# Patient Record
Sex: MALE | Race: BLACK OR AFRICAN AMERICAN | NOT HISPANIC OR LATINO | Employment: UNEMPLOYED | RURAL
[De-identification: names, ages, dates, MRNs, and addresses within clinical notes are randomized per-mention and may not be internally consistent; named-entity substitution may affect disease eponyms.]

---

## 2020-10-27 ENCOUNTER — HISTORICAL (OUTPATIENT)
Dept: ADMINISTRATIVE | Facility: HOSPITAL | Age: 40
End: 2020-10-27

## 2020-10-27 LAB
ALBUMIN SERPL BCP-MCNC: 4 G/DL (ref 3.5–5)
ALBUMIN/GLOB SERPL: 1 {RATIO}
ALP SERPL-CCNC: 50 U/L (ref 45–115)
ALT SERPL W P-5'-P-CCNC: 85 U/L (ref 16–61)
AMPHET UR QL SCN: NEGATIVE
ANION GAP SERPL CALCULATED.3IONS-SCNC: 16 MMOL/L
APAP UR QL SCN: ABNORMAL
AST SERPL W P-5'-P-CCNC: 57 U/L (ref 15–37)
BARBITURATES UR QL SCN: NEGATIVE
BASOPHILS # BLD AUTO: 0.02 X10E3/UL (ref 0–0.2)
BASOPHILS NFR BLD AUTO: 0.5 % (ref 0–1)
BENZODIAZ METAB UR QL SCN: NEGATIVE
BILIRUB SERPL-MCNC: 1.1 MG/DL (ref 0–1.2)
BUN SERPL-MCNC: 12 MG/DL (ref 7–18)
BUN/CREAT SERPL: 10.9
CALCIUM SERPL-MCNC: 9.3 MG/DL (ref 8.5–10.1)
CHLORIDE SERPL-SCNC: 100 MMOL/L (ref 98–107)
CO2 SERPL-SCNC: 24 MMOL/L (ref 21–32)
COCAINE UR QL SCN: NEGATIVE
CREAT SERPL-MCNC: 1.1 MG/DL (ref 0.7–1.3)
EOSINOPHIL # BLD AUTO: 0.03 X10E3/UL (ref 0–0.5)
EOSINOPHIL NFR BLD AUTO: 0.7 % (ref 1–4)
ERYTHROCYTE [DISTWIDTH] IN BLOOD BY AUTOMATED COUNT: 12.8 % (ref 11.5–14.5)
GLOBULIN SER-MCNC: 4.1 G/DL (ref 2–4)
GLUCOSE SERPL-MCNC: 207 MG/DL (ref 70–105)
GLUCOSE SERPL-MCNC: 342 MG/DL (ref 74–106)
HCT VFR BLD AUTO: 47.3 % (ref 40–54)
HGB BLD-MCNC: 17.3 G/DL (ref 13.5–18)
IMM GRANULOCYTES # BLD AUTO: 0.03 X10E3/UL (ref 0–0.04)
IMM GRANULOCYTES NFR BLD: 0.7 % (ref 0–0.4)
LYMPHOCYTES # BLD AUTO: 1.31 X10E3/UL (ref 1–4.8)
LYMPHOCYTES NFR BLD AUTO: 29.8 % (ref 27–41)
MCH RBC QN AUTO: 31.8 PG (ref 27–31)
MCHC RBC AUTO-ENTMCNC: 36.6 G/DL (ref 32–36)
MCV RBC AUTO: 86.9 FL (ref 80–96)
MDA UR QL SCN: NEGATIVE
METHADONE UR QL SCN: ABNORMAL
METHAMPHET UR QL SCN: ABNORMAL
MONOCYTES # BLD AUTO: 0.33 X10E3/UL (ref 0–0.8)
MONOCYTES NFR BLD AUTO: 7.5 % (ref 2–6)
MPC BLD CALC-MCNC: 10.9 FL (ref 9.4–12.4)
NEUTROPHILS # BLD AUTO: 2.67 X10E3/UL (ref 1.8–7.7)
NEUTROPHILS NFR BLD AUTO: 60.8 % (ref 53–65)
NT-PROBNP SERPL-MCNC: 54 PG/ML (ref 0–125)
OPIATES UR QL SCN: NEGATIVE
OXYCODONE UR QL SCN: NEGATIVE
PCP UR QL SCN: NEGATIVE
PLATELET # BLD AUTO: 227 X10E3/UL (ref 150–400)
POTASSIUM SERPL-SCNC: 4.1 MMOL/L (ref 3.5–5.1)
PROPOXYPH UR QL SCN: NEGATIVE
PROT SERPL-MCNC: 8.1 G/DL (ref 6.4–8.2)
RBC # BLD AUTO: 5.44 X10E6/UL (ref 4.6–6.2)
SODIUM SERPL-SCNC: 136 MMOL/L (ref 136–145)
THC UR QL SCN: NEGATIVE
TRICYCLICS UR QL SCN: NEGATIVE
TROPONIN I SERPL-MCNC: <0.017 NG/ML (ref 0–0.06)
WBC # BLD AUTO: 4.39 X10E3/UL (ref 4.5–11)

## 2021-06-09 ENCOUNTER — HOSPITAL ENCOUNTER (EMERGENCY)
Facility: HOSPITAL | Age: 41
Discharge: HOME | End: 2021-06-09

## 2021-06-09 VITALS
OXYGEN SATURATION: 96 % | WEIGHT: 234.81 LBS | TEMPERATURE: 98 F | RESPIRATION RATE: 18 BRPM | SYSTOLIC BLOOD PRESSURE: 122 MMHG | DIASTOLIC BLOOD PRESSURE: 80 MMHG | HEART RATE: 113 BPM | HEIGHT: 72 IN | BODY MASS INDEX: 31.8 KG/M2

## 2021-06-09 DIAGNOSIS — R73.9 HYPERGLYCEMIA WITHOUT KETOSIS: ICD-10-CM

## 2021-06-09 DIAGNOSIS — J02.9 PHARYNGITIS, UNSPECIFIED ETIOLOGY: Primary | ICD-10-CM

## 2021-06-09 DIAGNOSIS — R05.9 COUGH: ICD-10-CM

## 2021-06-09 LAB
ACETONE SERPL QL SCN: NEGATIVE
ANION GAP SERPL CALCULATED.3IONS-SCNC: 16 MMOL/L (ref 7–16)
BASOPHILS # BLD AUTO: 0.03 K/UL (ref 0–0.2)
BASOPHILS NFR BLD AUTO: 0.5 % (ref 0–1)
BILIRUB UR QL STRIP: NEGATIVE
BUN SERPL-MCNC: 9 MG/DL (ref 7–18)
BUN/CREAT SERPL: 7 (ref 6–20)
CALCIUM SERPL-MCNC: 9.2 MG/DL (ref 8.5–10.1)
CHLORIDE SERPL-SCNC: 105 MMOL/L (ref 98–107)
CLARITY UR: CLEAR
CO2 SERPL-SCNC: 25 MMOL/L (ref 21–32)
COLOR UR: YELLOW
CREAT SERPL-MCNC: 1.25 MG/DL (ref 0.7–1.3)
DIFFERENTIAL METHOD BLD: ABNORMAL
EOSINOPHIL # BLD AUTO: 0.07 K/UL (ref 0–0.5)
EOSINOPHIL NFR BLD AUTO: 1.1 % (ref 1–4)
ERYTHROCYTE [DISTWIDTH] IN BLOOD BY AUTOMATED COUNT: 12.5 % (ref 11.5–14.5)
GLUCOSE SERPL-MCNC: 506 MG/DL (ref 74–106)
GLUCOSE UR STRIP-MCNC: >=1000 MG/DL
HCT VFR BLD AUTO: 44.8 % (ref 40–54)
HGB BLD-MCNC: 16.1 G/DL (ref 13.5–18)
IMM GRANULOCYTES # BLD AUTO: 0.03 K/UL (ref 0–0.04)
IMM GRANULOCYTES NFR BLD: 0.5 % (ref 0–0.4)
KETONES UR STRIP-SCNC: NEGATIVE MG/DL
LEUKOCYTE ESTERASE UR QL STRIP: NEGATIVE
LYMPHOCYTES # BLD AUTO: 1.98 K/UL (ref 1–4.8)
LYMPHOCYTES NFR BLD AUTO: 30 % (ref 27–41)
MCH RBC QN AUTO: 32.5 PG (ref 27–31)
MCHC RBC AUTO-ENTMCNC: 35.9 G/DL (ref 32–36)
MCV RBC AUTO: 90.3 FL (ref 80–96)
MONOCYTES # BLD AUTO: 0.5 K/UL (ref 0–0.8)
MONOCYTES NFR BLD AUTO: 7.6 % (ref 2–6)
MPC BLD CALC-MCNC: 10.9 FL (ref 9.4–12.4)
NEUTROPHILS # BLD AUTO: 3.98 K/UL (ref 1.8–7.7)
NEUTROPHILS NFR BLD AUTO: 60.3 % (ref 53–65)
NITRITE UR QL STRIP: NEGATIVE
PH UR STRIP: 5.5 PH UNITS
PLATELET # BLD AUTO: 205 K/UL (ref 150–400)
POTASSIUM SERPL-SCNC: 4.5 MMOL/L (ref 3.5–5.1)
PROT UR QL STRIP: NEGATIVE
RAPID GROUP A STREP: NEGATIVE
RBC # BLD AUTO: 4.96 M/UL (ref 4.6–6.2)
RBC # UR STRIP: NEGATIVE /UL
SODIUM SERPL-SCNC: 141 MMOL/L (ref 136–145)
SP GR UR STRIP: 1.01
UROBILINOGEN UR STRIP-ACNC: 1 MG/DL
WBC # BLD AUTO: 6.59 K/UL (ref 4.5–11)

## 2021-06-09 PROCEDURE — 87070 CULTURE OTHR SPECIMN AEROBIC: CPT | Performed by: INTERNAL MEDICINE

## 2021-06-09 PROCEDURE — 82009 KETONE BODYS QUAL: CPT | Performed by: INTERNAL MEDICINE

## 2021-06-09 PROCEDURE — 87880 STREP A ASSAY W/OPTIC: CPT | Performed by: INTERNAL MEDICINE

## 2021-06-09 PROCEDURE — 99283 EMERGENCY DEPT VISIT LOW MDM: CPT | Mod: ,,, | Performed by: INTERNAL MEDICINE

## 2021-06-09 PROCEDURE — 85025 COMPLETE CBC W/AUTO DIFF WBC: CPT | Performed by: INTERNAL MEDICINE

## 2021-06-09 PROCEDURE — 96361 HYDRATE IV INFUSION ADD-ON: CPT

## 2021-06-09 PROCEDURE — 36415 COLL VENOUS BLD VENIPUNCTURE: CPT | Performed by: INTERNAL MEDICINE

## 2021-06-09 PROCEDURE — 80048 BASIC METABOLIC PNL TOTAL CA: CPT | Performed by: INTERNAL MEDICINE

## 2021-06-09 PROCEDURE — 99283 PR EMERGENCY DEPT VISIT,LEVEL III: ICD-10-PCS | Mod: ,,, | Performed by: INTERNAL MEDICINE

## 2021-06-09 PROCEDURE — 99284 EMERGENCY DEPT VISIT MOD MDM: CPT | Mod: 25

## 2021-06-09 PROCEDURE — 63600175 PHARM REV CODE 636 W HCPCS

## 2021-06-09 PROCEDURE — 96374 THER/PROPH/DIAG INJ IV PUSH: CPT

## 2021-06-09 PROCEDURE — 25000003 PHARM REV CODE 250: Performed by: INTERNAL MEDICINE

## 2021-06-09 PROCEDURE — 96372 THER/PROPH/DIAG INJ SC/IM: CPT

## 2021-06-09 PROCEDURE — 81003 URINALYSIS AUTO W/O SCOPE: CPT | Performed by: INTERNAL MEDICINE

## 2021-06-09 RX ORDER — METFORMIN HYDROCHLORIDE 500 MG/1
500 TABLET ORAL 2 TIMES DAILY WITH MEALS
COMMUNITY
End: 2022-03-31 | Stop reason: SDUPTHER

## 2021-06-09 RX ORDER — ACETAMINOPHEN 325 MG/1
650 TABLET ORAL
Status: COMPLETED | OUTPATIENT
Start: 2021-06-09 | End: 2021-06-09

## 2021-06-09 RX ORDER — METOPROLOL TARTRATE 25 MG/1
25 TABLET, FILM COATED ORAL 2 TIMES DAILY
COMMUNITY
End: 2021-10-18 | Stop reason: SDUPTHER

## 2021-06-09 RX ORDER — ASPIRIN 81 MG/1
81 TABLET ORAL DAILY
COMMUNITY

## 2021-06-09 RX ORDER — AZITHROMYCIN 250 MG/1
250 TABLET, FILM COATED ORAL DAILY
Qty: 6 TABLET | Refills: 0 | Status: SHIPPED | OUTPATIENT
Start: 2021-06-09 | End: 2021-07-23 | Stop reason: CLARIF

## 2021-06-09 RX ORDER — AZITHROMYCIN 250 MG/1
250 TABLET, FILM COATED ORAL DAILY
Qty: 6 TABLET | Refills: 0 | OUTPATIENT
Start: 2021-06-09 | End: 2021-06-09 | Stop reason: SDUPTHER

## 2021-06-09 RX ORDER — SODIUM CHLORIDE 9 MG/ML
INJECTION, SOLUTION INTRAVENOUS
Status: COMPLETED | OUTPATIENT
Start: 2021-06-09 | End: 2021-06-09

## 2021-06-09 RX ORDER — ACETAMINOPHEN 325 MG/1
TABLET ORAL
Status: DISCONTINUED
Start: 2021-06-09 | End: 2021-06-09 | Stop reason: HOSPADM

## 2021-06-09 RX ORDER — CEFTRIAXONE 1 G/1
1 INJECTION, POWDER, FOR SOLUTION INTRAMUSCULAR; INTRAVENOUS
Status: COMPLETED | OUTPATIENT
Start: 2021-06-09 | End: 2021-06-09

## 2021-06-09 RX ORDER — CEFTRIAXONE 1 G/1
INJECTION, POWDER, FOR SOLUTION INTRAMUSCULAR; INTRAVENOUS
Status: COMPLETED
Start: 2021-06-09 | End: 2021-06-09

## 2021-06-09 RX ADMIN — SODIUM CHLORIDE: 0.9 INJECTION, SOLUTION INTRAVENOUS at 01:06

## 2021-06-09 RX ADMIN — ACETAMINOPHEN 650 MG: 325 TABLET, FILM COATED ORAL at 02:06

## 2021-06-09 RX ADMIN — CEFTRIAXONE SODIUM 1 G: 1 INJECTION, POWDER, FOR SOLUTION INTRAMUSCULAR; INTRAVENOUS at 01:06

## 2021-06-09 RX ADMIN — HUMAN INSULIN 10 UNITS: 100 INJECTION, SOLUTION SUBCUTANEOUS at 01:06

## 2021-06-09 RX ADMIN — CEFTRIAXONE 1 G: 1 INJECTION, POWDER, FOR SOLUTION INTRAMUSCULAR; INTRAVENOUS at 01:06

## 2021-06-10 ENCOUNTER — TELEPHONE (OUTPATIENT)
Dept: EMERGENCY MEDICINE | Facility: HOSPITAL | Age: 41
End: 2021-06-10

## 2021-06-11 LAB
DEPRECATED S PYO AG THROAT QL EIA: NORMAL
GLUCOSE SERPL-MCNC: 289 MG/DL (ref 70–105)

## 2021-07-23 ENCOUNTER — HOSPITAL ENCOUNTER (EMERGENCY)
Facility: HOSPITAL | Age: 41
Discharge: HOME | End: 2021-07-24
Payer: OTHER GOVERNMENT

## 2021-07-23 DIAGNOSIS — R51.9 SINUS HEADACHE: ICD-10-CM

## 2021-07-23 DIAGNOSIS — R42 LIGHTHEADEDNESS: ICD-10-CM

## 2021-07-23 DIAGNOSIS — U07.1 COVID-19: Primary | ICD-10-CM

## 2021-07-23 LAB — GLUCOSE SERPL-MCNC: 299 MG/DL (ref 70–105)

## 2021-07-23 PROCEDURE — 99284 EMERGENCY DEPT VISIT MOD MDM: CPT | Mod: ,,, | Performed by: SPECIALIST

## 2021-07-23 PROCEDURE — 82962 GLUCOSE BLOOD TEST: CPT

## 2021-07-23 PROCEDURE — 99285 EMERGENCY DEPT VISIT HI MDM: CPT | Mod: 25

## 2021-07-23 PROCEDURE — 96372 THER/PROPH/DIAG INJ SC/IM: CPT

## 2021-07-23 PROCEDURE — 96365 THER/PROPH/DIAG IV INF INIT: CPT

## 2021-07-23 PROCEDURE — 99284 PR EMERGENCY DEPT VISIT,LEVEL IV: ICD-10-PCS | Mod: ,,, | Performed by: SPECIALIST

## 2021-07-23 PROCEDURE — 96366 THER/PROPH/DIAG IV INF ADDON: CPT

## 2021-07-23 PROCEDURE — 96375 TX/PRO/DX INJ NEW DRUG ADDON: CPT

## 2021-07-24 ENCOUNTER — TELEPHONE (OUTPATIENT)
Dept: EMERGENCY MEDICINE | Facility: HOSPITAL | Age: 41
End: 2021-07-24

## 2021-07-24 VITALS
BODY MASS INDEX: 31.15 KG/M2 | DIASTOLIC BLOOD PRESSURE: 72 MMHG | OXYGEN SATURATION: 100 % | HEART RATE: 100 BPM | RESPIRATION RATE: 18 BRPM | WEIGHT: 230 LBS | HEIGHT: 72 IN | SYSTOLIC BLOOD PRESSURE: 130 MMHG | TEMPERATURE: 98 F

## 2021-07-24 LAB
FLUAV AG UPPER RESP QL IA.RAPID: NEGATIVE
FLUBV AG UPPER RESP QL IA.RAPID: NEGATIVE
GLUCOSE SERPL-MCNC: 312 MG/DL (ref 70–105)
SARS-COV+SARS-COV-2 AG RESP QL IA.RAPID: POSITIVE

## 2021-07-24 PROCEDURE — 93010 EKG 12-LEAD: ICD-10-PCS | Mod: ,,, | Performed by: INTERNAL MEDICINE

## 2021-07-24 PROCEDURE — 93005 ELECTROCARDIOGRAM TRACING: CPT

## 2021-07-24 PROCEDURE — 87428 SARSCOV & INF VIR A&B AG IA: CPT | Performed by: SPECIALIST

## 2021-07-24 PROCEDURE — 25000003 PHARM REV CODE 250: Performed by: SPECIALIST

## 2021-07-24 PROCEDURE — 82962 GLUCOSE BLOOD TEST: CPT

## 2021-07-24 PROCEDURE — 63600175 PHARM REV CODE 636 W HCPCS: Performed by: SPECIALIST

## 2021-07-24 PROCEDURE — 93010 ELECTROCARDIOGRAM REPORT: CPT | Mod: ,,, | Performed by: INTERNAL MEDICINE

## 2021-07-24 PROCEDURE — 63600175 PHARM REV CODE 636 W HCPCS

## 2021-07-24 PROCEDURE — M0243 CASIRIVI AND IMDEVI INFUSION: HCPCS | Performed by: SPECIALIST

## 2021-07-24 RX ORDER — HALOPERIDOL 5 MG/ML
5 INJECTION INTRAMUSCULAR
Status: COMPLETED | OUTPATIENT
Start: 2021-07-24 | End: 2021-07-24

## 2021-07-24 RX ORDER — ONDANSETRON 2 MG/ML
4 INJECTION INTRAMUSCULAR; INTRAVENOUS ONCE
Status: COMPLETED | OUTPATIENT
Start: 2021-07-24 | End: 2021-07-24

## 2021-07-24 RX ORDER — SODIUM CHLORIDE 0.9 % (FLUSH) 0.9 %
10 SYRINGE (ML) INJECTION
Status: DISCONTINUED | OUTPATIENT
Start: 2021-07-24 | End: 2021-07-24 | Stop reason: HOSPADM

## 2021-07-24 RX ORDER — MAGNESIUM SULFATE HEPTAHYDRATE 40 MG/ML
2 INJECTION, SOLUTION INTRAVENOUS
Status: COMPLETED | OUTPATIENT
Start: 2021-07-24 | End: 2021-07-24

## 2021-07-24 RX ORDER — DIPHENHYDRAMINE HYDROCHLORIDE 50 MG/ML
25 INJECTION INTRAMUSCULAR; INTRAVENOUS ONCE AS NEEDED
Status: DISCONTINUED | OUTPATIENT
Start: 2021-07-24 | End: 2021-07-24 | Stop reason: HOSPADM

## 2021-07-24 RX ORDER — EPINEPHRINE 0.3 MG/.3ML
0.3 INJECTION SUBCUTANEOUS ONCE
Status: DISCONTINUED | OUTPATIENT
Start: 2021-07-24 | End: 2021-07-24 | Stop reason: HOSPADM

## 2021-07-24 RX ORDER — CEFTRIAXONE 1 G/1
INJECTION, POWDER, FOR SOLUTION INTRAMUSCULAR; INTRAVENOUS
Status: COMPLETED
Start: 2021-07-24 | End: 2021-07-24

## 2021-07-24 RX ORDER — CEFTRIAXONE 1 G/1
1 INJECTION, POWDER, FOR SOLUTION INTRAMUSCULAR; INTRAVENOUS
Status: COMPLETED | OUTPATIENT
Start: 2021-07-24 | End: 2021-07-24

## 2021-07-24 RX ORDER — SODIUM CHLORIDE 9 MG/ML
INJECTION, SOLUTION INTRAVENOUS
Status: DISCONTINUED
Start: 2021-07-24 | End: 2021-07-24 | Stop reason: HOSPADM

## 2021-07-24 RX ORDER — ONDANSETRON 4 MG/1
4 TABLET, ORALLY DISINTEGRATING ORAL ONCE AS NEEDED
Status: DISCONTINUED | OUTPATIENT
Start: 2021-07-24 | End: 2021-07-24 | Stop reason: HOSPADM

## 2021-07-24 RX ORDER — KETOROLAC TROMETHAMINE 30 MG/ML
30 INJECTION, SOLUTION INTRAMUSCULAR; INTRAVENOUS
Status: COMPLETED | OUTPATIENT
Start: 2021-07-24 | End: 2021-07-24

## 2021-07-24 RX ORDER — MAGNESIUM SULFATE HEPTAHYDRATE 40 MG/ML
INJECTION, SOLUTION INTRAVENOUS
Status: DISCONTINUED
Start: 2021-07-24 | End: 2021-07-24 | Stop reason: HOSPADM

## 2021-07-24 RX ORDER — ACETAMINOPHEN 325 MG/1
650 TABLET ORAL ONCE AS NEEDED
Status: DISCONTINUED | OUTPATIENT
Start: 2021-07-24 | End: 2021-07-24 | Stop reason: HOSPADM

## 2021-07-24 RX ORDER — DEXAMETHASONE SODIUM PHOSPHATE 4 MG/ML
4 INJECTION, SOLUTION INTRA-ARTICULAR; INTRALESIONAL; INTRAMUSCULAR; INTRAVENOUS; SOFT TISSUE
Status: COMPLETED | OUTPATIENT
Start: 2021-07-24 | End: 2021-07-24

## 2021-07-24 RX ORDER — ALBUTEROL SULFATE 90 UG/1
2 AEROSOL, METERED RESPIRATORY (INHALATION)
Status: DISCONTINUED | OUTPATIENT
Start: 2021-07-24 | End: 2021-07-24 | Stop reason: HOSPADM

## 2021-07-24 RX ADMIN — CEFTRIAXONE SODIUM 1 G: 1 INJECTION, POWDER, FOR SOLUTION INTRAMUSCULAR; INTRAVENOUS at 04:07

## 2021-07-24 RX ADMIN — HALOPERIDOL LACTATE 5 MG: 5 INJECTION, SOLUTION INTRAMUSCULAR at 12:07

## 2021-07-24 RX ADMIN — CASIRIVIMAB 600 MG: 300 INJECTION, SOLUTION, CONCENTRATE INTRAVENOUS at 03:07

## 2021-07-24 RX ADMIN — SODIUM CHLORIDE 1000 ML: 0.9 INJECTION, SOLUTION INTRAVENOUS at 12:07

## 2021-07-24 RX ADMIN — MAGNESIUM SULFATE HEPTAHYDRATE 2 G: 40 INJECTION, SOLUTION INTRAVENOUS at 12:07

## 2021-07-24 RX ADMIN — DEXAMETHASONE SODIUM PHOSPHATE 4 MG: 4 INJECTION, SOLUTION INTRAMUSCULAR; INTRAVENOUS at 12:07

## 2021-07-24 RX ADMIN — CEFTRIAXONE 1 G: 1 INJECTION, POWDER, FOR SOLUTION INTRAMUSCULAR; INTRAVENOUS at 04:07

## 2021-07-24 RX ADMIN — ONDANSETRON 4 MG: 2 INJECTION INTRAMUSCULAR; INTRAVENOUS at 12:07

## 2021-07-24 RX ADMIN — KETOROLAC TROMETHAMINE 30 MG: 30 INJECTION, SOLUTION INTRAMUSCULAR at 12:07

## 2021-10-18 DIAGNOSIS — I10 PRIMARY HYPERTENSION: ICD-10-CM

## 2021-10-18 DIAGNOSIS — I10 HYPERTENSION, UNSPECIFIED TYPE: Primary | ICD-10-CM

## 2021-10-18 RX ORDER — METOPROLOL TARTRATE 25 MG/1
25 TABLET, FILM COATED ORAL 2 TIMES DAILY
Qty: 60 TABLET | Refills: 0 | Status: SHIPPED | OUTPATIENT
Start: 2021-10-18 | End: 2022-03-31 | Stop reason: SDUPTHER

## 2022-01-12 ENCOUNTER — HOSPITAL ENCOUNTER (EMERGENCY)
Facility: HOSPITAL | Age: 42
Discharge: HOME | End: 2022-01-12
Attending: EMERGENCY MEDICINE

## 2022-01-12 VITALS
HEART RATE: 127 BPM | SYSTOLIC BLOOD PRESSURE: 114 MMHG | RESPIRATION RATE: 20 BRPM | BODY MASS INDEX: 30.09 KG/M2 | DIASTOLIC BLOOD PRESSURE: 65 MMHG | HEIGHT: 72 IN | TEMPERATURE: 100 F | OXYGEN SATURATION: 98 % | WEIGHT: 222.19 LBS

## 2022-01-12 DIAGNOSIS — Z13.9 ENCOUNTER FOR MEDICAL SCREENING EXAMINATION: Primary | ICD-10-CM

## 2022-01-12 LAB — GLUCOSE SERPL-MCNC: 287 MG/DL (ref 70–105)

## 2022-01-12 PROCEDURE — 99999 HC NO LEVEL OF SERVICE - ED ONLY: CPT

## 2022-01-12 PROCEDURE — 99499 NO LOS: ICD-10-PCS | Mod: ,,, | Performed by: EMERGENCY MEDICINE

## 2022-01-12 PROCEDURE — 82962 GLUCOSE BLOOD TEST: CPT

## 2022-01-12 PROCEDURE — 99499 UNLISTED E&M SERVICE: CPT | Mod: ,,, | Performed by: EMERGENCY MEDICINE

## 2022-01-12 NOTE — ED PROVIDER NOTES
Encounter Date: 1/12/2022       History     Chief Complaint   Patient presents with    COVID-19 Concerns    Hyperglycemia     Patient has had fever cough and body aches for 1-2 days.  No shortness of breath or chest pain.        Review of patient's allergies indicates:   Allergen Reactions    Lisinopril Swelling     Past Medical History:   Diagnosis Date    A-fib     CHF (congestive heart failure)     Diabetes mellitus     Hypertension      Past Surgical History:   Procedure Laterality Date    CARDIOVERSION       Family History   Problem Relation Age of Onset    Diabetes Mother     Heart disease Mother     Diabetes Father     Heart disease Father      Social History     Tobacco Use    Smoking status: Former Smoker    Smokeless tobacco: Never Used   Substance Use Topics    Alcohol use: Yes     Comment: OCCASIONALLY     Drug use: Yes     Types: Marijuana     Review of Systems   Constitutional: Positive for fever. Negative for activity change, appetite change, chills, diaphoresis and fatigue.   HENT: Positive for congestion and sore throat. Negative for ear discharge, ear pain, facial swelling, hearing loss, mouth sores, nosebleeds, postnasal drip, rhinorrhea, sinus pressure, sinus pain, sneezing, tinnitus, trouble swallowing and voice change.    Eyes: Negative.    Respiratory: Positive for cough. Negative for apnea, choking, chest tightness, shortness of breath, wheezing and stridor.    Cardiovascular: Negative.    Gastrointestinal: Negative.    Genitourinary: Negative.    Musculoskeletal: Negative.    Skin: Negative.    Neurological: Negative.    Psychiatric/Behavioral: Negative.    All other systems reviewed and are negative.      Physical Exam     Initial Vitals [01/12/22 1306]   BP Pulse Resp Temp SpO2   114/65 (!) 127 20 99.7 °F (37.6 °C) 98 %      MAP       --         Physical Exam    Nursing note and vitals reviewed.  Constitutional: He appears well-developed and well-nourished. He is not  diaphoretic. No distress.   HENT:   Head: Normocephalic.   Right Ear: External ear normal.   Left Ear: External ear normal.   Nose: Nose normal.   Mouth/Throat: Oropharynx is clear and moist. No oropharyngeal exudate.   Eyes: Conjunctivae and EOM are normal. Pupils are equal, round, and reactive to light. Right eye exhibits no discharge. Left eye exhibits no discharge.   Neck: Neck supple. No tracheal deviation present. No JVD present.   Normal range of motion.  Cardiovascular: Regular rhythm, normal heart sounds and intact distal pulses.   No murmur heard.  Mild tachycardia noted.   Pulmonary/Chest: Breath sounds normal. No stridor. No respiratory distress. He has no wheezes. He has no rhonchi. He has no rales.   Abdominal: Abdomen is soft. Bowel sounds are normal. There is no abdominal tenderness.   Musculoskeletal:         General: No tenderness or edema. Normal range of motion.      Cervical back: Normal range of motion and neck supple.     Lymphadenopathy:     He has no cervical adenopathy.   Neurological: He is alert and oriented to person, place, and time. He has normal strength. No cranial nerve deficit or sensory deficit. GCS score is 15. GCS eye subscore is 4. GCS verbal subscore is 5. GCS motor subscore is 6.   Skin: Skin is warm. Capillary refill takes less than 2 seconds. No rash noted. No erythema. No pallor.   Psychiatric: He has a normal mood and affect. His behavior is normal.         Medical Screening Exam   Chief Symptom:  Chief Complaint is URI symptoms. Chief Complaint HPI is Acute.  Vital Signs:  ED Triage Vitals (01/12/22 1306)  BP: 114/65  Pulse: (!) 127  Resp: 20  Temp: 99.7 °F (37.6 °C)  SpO2: 98 %    Mental State:  Any evidence of altered mental status?  No  General Appearance:  Well appearing?  Yes  Degree of Pain:  Visual analog pain score less than 3?  Yes  Skin:  Evidence of dehydration or poor perfusion?  No  Focused Physical Examination:  Normal exam as above.       Ambulatory  Status:  Ability to ambulate without difficulty?  Yes      ED Course   Procedures  Labs Reviewed   POCT GLUCOSE MONITORING CONTINUOUS - Abnormal; Notable for the following components:       Result Value    POC Glucose 287 (*)     All other components within normal limits          Imaging Results    None          Medications - No data to display                    Clinical Impression:   Final diagnoses:  [Z13.9] Encounter for medical screening examination (Primary)          ED Disposition Condition    Sent to Physician's Office               Hay Gaston DO  01/12/22 2489

## 2022-01-12 NOTE — ED TRIAGE NOTES
Headache, body aches and cough x2 days. Pt states his blood sugar has also been elevated. greater than 400 this am.

## 2022-01-13 ENCOUNTER — TELEPHONE (OUTPATIENT)
Dept: EMERGENCY MEDICINE | Facility: HOSPITAL | Age: 42
End: 2022-01-13

## 2022-03-31 ENCOUNTER — OFFICE VISIT (OUTPATIENT)
Dept: PRIMARY CARE CLINIC | Facility: CLINIC | Age: 42
End: 2022-03-31
Payer: COMMERCIAL

## 2022-03-31 VITALS
DIASTOLIC BLOOD PRESSURE: 84 MMHG | HEIGHT: 72 IN | OXYGEN SATURATION: 95 % | SYSTOLIC BLOOD PRESSURE: 130 MMHG | WEIGHT: 221 LBS | BODY MASS INDEX: 29.93 KG/M2 | HEART RATE: 123 BPM | TEMPERATURE: 97 F

## 2022-03-31 DIAGNOSIS — Z79.4 TYPE 2 DIABETES MELLITUS WITH OTHER SPECIFIED COMPLICATION, WITH LONG-TERM CURRENT USE OF INSULIN: Primary | ICD-10-CM

## 2022-03-31 DIAGNOSIS — E11.69 TYPE 2 DIABETES MELLITUS WITH OTHER SPECIFIED COMPLICATION, WITH LONG-TERM CURRENT USE OF INSULIN: Primary | ICD-10-CM

## 2022-03-31 DIAGNOSIS — I10 PRIMARY HYPERTENSION: ICD-10-CM

## 2022-03-31 DIAGNOSIS — I42.9 CARDIOMYOPATHY, UNSPECIFIED TYPE: ICD-10-CM

## 2022-03-31 PROBLEM — E11.9 TYPE 2 DIABETES MELLITUS, WITH LONG-TERM CURRENT USE OF INSULIN: Status: ACTIVE | Noted: 2022-03-31

## 2022-03-31 LAB
ALBUMIN SERPL BCP-MCNC: 4.1 G/DL (ref 3.5–5)
ALBUMIN/GLOB SERPL: 1.1 {RATIO}
ALP SERPL-CCNC: 52 U/L (ref 45–115)
ALT SERPL W P-5'-P-CCNC: 64 U/L (ref 16–61)
ANION GAP SERPL CALCULATED.3IONS-SCNC: 10 MMOL/L (ref 7–16)
AST SERPL W P-5'-P-CCNC: 33 U/L (ref 15–37)
BASOPHILS # BLD AUTO: 0.04 K/UL (ref 0–0.2)
BASOPHILS NFR BLD AUTO: 0.8 % (ref 0–1)
BILIRUB SERPL-MCNC: 1.2 MG/DL (ref 0–1.2)
BUN SERPL-MCNC: 10 MG/DL (ref 7–18)
BUN/CREAT SERPL: 9 (ref 6–20)
CALCIUM SERPL-MCNC: 9.7 MG/DL (ref 8.5–10.1)
CHLORIDE SERPL-SCNC: 104 MMOL/L (ref 98–107)
CO2 SERPL-SCNC: 28 MMOL/L (ref 21–32)
CREAT SERPL-MCNC: 1.15 MG/DL (ref 0.7–1.3)
DIFFERENTIAL METHOD BLD: ABNORMAL
EOSINOPHIL # BLD AUTO: 0.07 K/UL (ref 0–0.5)
EOSINOPHIL NFR BLD AUTO: 1.4 % (ref 1–4)
ERYTHROCYTE [DISTWIDTH] IN BLOOD BY AUTOMATED COUNT: 12.7 % (ref 11.5–14.5)
EST. AVERAGE GLUCOSE BLD GHB EST-MCNC: 254 MG/DL
GLOBULIN SER-MCNC: 3.8 G/DL (ref 2–4)
GLUCOSE SERPL-MCNC: 363 MG/DL (ref 74–106)
GLUCOSE SERPL-MCNC: 368 MG/DL (ref 70–110)
HBA1C MFR BLD HPLC: 10.2 % (ref 4.5–6.6)
HCT VFR BLD AUTO: 52.6 % (ref 40–54)
HGB BLD-MCNC: 18.2 G/DL (ref 13.5–18)
IMM GRANULOCYTES # BLD AUTO: 0.03 K/UL (ref 0–0.04)
IMM GRANULOCYTES NFR BLD: 0.6 % (ref 0–0.4)
LYMPHOCYTES # BLD AUTO: 2.18 K/UL (ref 1–4.8)
LYMPHOCYTES NFR BLD AUTO: 42.5 % (ref 27–41)
MCH RBC QN AUTO: 31.1 PG (ref 27–31)
MCHC RBC AUTO-ENTMCNC: 34.6 G/DL (ref 32–36)
MCV RBC AUTO: 89.9 FL (ref 80–96)
MONOCYTES # BLD AUTO: 0.4 K/UL (ref 0–0.8)
MONOCYTES NFR BLD AUTO: 7.8 % (ref 2–6)
MPC BLD CALC-MCNC: 12 FL (ref 9.4–12.4)
NEUTROPHILS # BLD AUTO: 2.41 K/UL (ref 1.8–7.7)
NEUTROPHILS NFR BLD AUTO: 46.9 % (ref 53–65)
NRBC # BLD AUTO: 0 X10E3/UL
NRBC, AUTO (.00): 0 %
PLATELET # BLD AUTO: 259 K/UL (ref 150–400)
POTASSIUM SERPL-SCNC: 4.9 MMOL/L (ref 3.5–5.1)
PROT SERPL-MCNC: 7.9 G/DL (ref 6.4–8.2)
RBC # BLD AUTO: 5.85 M/UL (ref 4.6–6.2)
SODIUM SERPL-SCNC: 137 MMOL/L (ref 136–145)
WBC # BLD AUTO: 5.13 K/UL (ref 4.5–11)

## 2022-03-31 PROCEDURE — 99214 PR OFFICE/OUTPT VISIT, EST, LEVL IV, 30-39 MIN: ICD-10-PCS | Mod: ,,, | Performed by: FAMILY MEDICINE

## 2022-03-31 PROCEDURE — 3008F PR BODY MASS INDEX (BMI) DOCUMENTED: ICD-10-PCS | Mod: ,,, | Performed by: FAMILY MEDICINE

## 2022-03-31 PROCEDURE — 85025 COMPLETE CBC W/AUTO DIFF WBC: CPT | Mod: ,,, | Performed by: CLINICAL MEDICAL LABORATORY

## 2022-03-31 PROCEDURE — 1159F MED LIST DOCD IN RCRD: CPT | Mod: ,,, | Performed by: FAMILY MEDICINE

## 2022-03-31 PROCEDURE — 82962 GLUCOSE BLOOD TEST: CPT | Mod: ,,, | Performed by: FAMILY MEDICINE

## 2022-03-31 PROCEDURE — 85025 CBC WITH DIFFERENTIAL: ICD-10-PCS | Mod: ,,, | Performed by: CLINICAL MEDICAL LABORATORY

## 2022-03-31 PROCEDURE — 3008F BODY MASS INDEX DOCD: CPT | Mod: ,,, | Performed by: FAMILY MEDICINE

## 2022-03-31 PROCEDURE — 80053 COMPREHENSIVE METABOLIC PANEL: ICD-10-PCS | Mod: ,,, | Performed by: CLINICAL MEDICAL LABORATORY

## 2022-03-31 PROCEDURE — 1159F PR MEDICATION LIST DOCUMENTED IN MEDICAL RECORD: ICD-10-PCS | Mod: ,,, | Performed by: FAMILY MEDICINE

## 2022-03-31 PROCEDURE — 83036 HEMOGLOBIN GLYCOSYLATED A1C: CPT | Mod: ,,, | Performed by: CLINICAL MEDICAL LABORATORY

## 2022-03-31 PROCEDURE — 3075F PR MOST RECENT SYSTOLIC BLOOD PRESS GE 130-139MM HG: ICD-10-PCS | Mod: ,,, | Performed by: FAMILY MEDICINE

## 2022-03-31 PROCEDURE — 3075F SYST BP GE 130 - 139MM HG: CPT | Mod: ,,, | Performed by: FAMILY MEDICINE

## 2022-03-31 PROCEDURE — 1160F PR REVIEW ALL MEDS BY PRESCRIBER/CLIN PHARMACIST DOCUMENTED: ICD-10-PCS | Mod: ,,, | Performed by: FAMILY MEDICINE

## 2022-03-31 PROCEDURE — 80053 COMPREHEN METABOLIC PANEL: CPT | Mod: ,,, | Performed by: CLINICAL MEDICAL LABORATORY

## 2022-03-31 PROCEDURE — 99214 OFFICE O/P EST MOD 30 MIN: CPT | Mod: ,,, | Performed by: FAMILY MEDICINE

## 2022-03-31 PROCEDURE — 83036 HEMOGLOBIN A1C: ICD-10-PCS | Mod: ,,, | Performed by: CLINICAL MEDICAL LABORATORY

## 2022-03-31 PROCEDURE — 82962 POCT GLUCOSE, HAND-HELD DEVICE: ICD-10-PCS | Mod: ,,, | Performed by: FAMILY MEDICINE

## 2022-03-31 PROCEDURE — 3079F DIAST BP 80-89 MM HG: CPT | Mod: ,,, | Performed by: FAMILY MEDICINE

## 2022-03-31 PROCEDURE — 3079F PR MOST RECENT DIASTOLIC BLOOD PRESSURE 80-89 MM HG: ICD-10-PCS | Mod: ,,, | Performed by: FAMILY MEDICINE

## 2022-03-31 PROCEDURE — 1160F RVW MEDS BY RX/DR IN RCRD: CPT | Mod: ,,, | Performed by: FAMILY MEDICINE

## 2022-03-31 RX ORDER — APIXABAN 5 MG/1
5 TABLET, FILM COATED ORAL 2 TIMES DAILY
COMMUNITY
Start: 2022-03-15 | End: 2022-03-31 | Stop reason: SDUPTHER

## 2022-03-31 RX ORDER — METOPROLOL TARTRATE 100 MG/1
100 TABLET ORAL 2 TIMES DAILY
COMMUNITY
Start: 2022-02-02 | End: 2022-03-31 | Stop reason: SDUPTHER

## 2022-03-31 RX ORDER — METOPROLOL TARTRATE 25 MG/1
25 TABLET, FILM COATED ORAL 2 TIMES DAILY
Qty: 180 TABLET | Refills: 3 | Status: SHIPPED | OUTPATIENT
Start: 2022-03-31 | End: 2022-12-20 | Stop reason: SDUPTHER

## 2022-03-31 RX ORDER — DIGOXIN 125 MCG
125 TABLET ORAL DAILY
Qty: 90 TABLET | Refills: 3 | Status: SHIPPED | OUTPATIENT
Start: 2022-03-31 | End: 2022-07-19 | Stop reason: SDUPTHER

## 2022-03-31 RX ORDER — DIGOXIN 125 MCG
TABLET ORAL
COMMUNITY
Start: 2022-02-02 | End: 2022-03-31 | Stop reason: SDUPTHER

## 2022-03-31 RX ORDER — APIXABAN 5 MG/1
5 TABLET, FILM COATED ORAL 2 TIMES DAILY
Qty: 180 TABLET | Refills: 3 | Status: SHIPPED | OUTPATIENT
Start: 2022-03-31

## 2022-03-31 RX ORDER — LIRAGLUTIDE 6 MG/ML
0.6 INJECTION SUBCUTANEOUS DAILY
Qty: 3 ML | Refills: 11 | Status: SHIPPED | OUTPATIENT
Start: 2022-03-31 | End: 2023-03-31

## 2022-03-31 RX ORDER — METFORMIN HYDROCHLORIDE 500 MG/1
500 TABLET ORAL 2 TIMES DAILY WITH MEALS
Qty: 180 TABLET | Refills: 3 | Status: SHIPPED | OUTPATIENT
Start: 2022-03-31

## 2022-03-31 RX ORDER — METOPROLOL TARTRATE 100 MG/1
100 TABLET ORAL 2 TIMES DAILY
Qty: 180 TABLET | Refills: 3 | OUTPATIENT
Start: 2022-03-31 | End: 2022-12-20

## 2022-03-31 NOTE — PROGRESS NOTES
Subjective:       Patient ID: Merline Ibarra is a 42 y.o. male.    Chief Complaint: Numbness (In right hand fourth/ffith finger. C/o locking up), Medication Refill, Hospital Follow Up (From [facility] and Diabetes (FSBS 268)      BS - 368mg% here. Pt. States that novolog is not helping him. He is requesting his victoza back.    Medication Refill  Pertinent negatives include no arthralgias, chest pain, coughing, fatigue, fever, headaches, myalgias, nausea or vomiting.   Diabetes  Pertinent negatives for hypoglycemia include no headaches. Pertinent negatives for diabetes include no chest pain and no fatigue.     Review of Systems   Constitutional: Negative for fatigue and fever.   HENT: Negative for dental problem.    Eyes: Negative for discharge.   Respiratory: Negative for cough, choking, chest tightness and shortness of breath.    Cardiovascular: Negative for chest pain and leg swelling.   Gastrointestinal: Negative for constipation, diarrhea, nausea and vomiting.   Genitourinary: Negative for discharge and flank pain.   Musculoskeletal: Negative for arthralgias and myalgias.   Allergic/Immunologic: Negative for environmental allergies.   Neurological: Negative for headaches and memory loss.   Psychiatric/Behavioral: Negative for behavioral problems and hallucinations.         Objective:      Physical Exam  Vitals and nursing note reviewed.   Constitutional:       Appearance: Normal appearance. He is normal weight.   HENT:      Head: Normocephalic and atraumatic.      Right Ear: Tympanic membrane normal.      Left Ear: Tympanic membrane normal.      Nose: Nose normal.      Mouth/Throat:      Mouth: Mucous membranes are moist.   Eyes:      Extraocular Movements: Extraocular movements intact.      Conjunctiva/sclera: Conjunctivae normal.      Pupils: Pupils are equal, round, and reactive to light.   Cardiovascular:      Rate and Rhythm: Normal rate and regular rhythm.      Pulses: Normal pulses.   Pulmonary:       Effort: Pulmonary effort is normal.      Breath sounds: Normal breath sounds.   Abdominal:      General: Abdomen is flat. Bowel sounds are normal.      Palpations: Abdomen is soft.   Musculoskeletal:         General: Normal range of motion.      Cervical back: Normal range of motion and neck supple.   Skin:     General: Skin is warm and dry.   Neurological:      General: No focal deficit present.      Mental Status: He is alert and oriented to person, place, and time.   Psychiatric:         Mood and Affect: Mood normal.         Assessment:       Type 2 diabetes mellitus with other specified complication, with long-term current use of insulin  -     POCT Glucose, Hand-Held Device  -     CBC Auto Differential; Future; Expected date: 03/31/2022  -     Comprehensive Metabolic Panel; Future; Expected date: 03/31/2022  -     Hemoglobin A1C; Future; Expected date: 03/31/2022

## 2022-07-19 ENCOUNTER — HOSPITAL ENCOUNTER (EMERGENCY)
Facility: HOSPITAL | Age: 42
Discharge: HOME | End: 2022-07-19
Attending: EMERGENCY MEDICINE
Payer: COMMERCIAL

## 2022-07-19 VITALS
HEIGHT: 72 IN | OXYGEN SATURATION: 99 % | HEART RATE: 108 BPM | SYSTOLIC BLOOD PRESSURE: 167 MMHG | DIASTOLIC BLOOD PRESSURE: 104 MMHG | TEMPERATURE: 98 F | WEIGHT: 212.38 LBS | RESPIRATION RATE: 12 BRPM | BODY MASS INDEX: 28.76 KG/M2

## 2022-07-19 DIAGNOSIS — I48.92 ATRIAL FIBRILLATION AND FLUTTER: Primary | ICD-10-CM

## 2022-07-19 DIAGNOSIS — I48.91 ATRIAL FIBRILLATION AND FLUTTER: Primary | ICD-10-CM

## 2022-07-19 DIAGNOSIS — I42.9 CARDIOMYOPATHY, UNSPECIFIED TYPE: ICD-10-CM

## 2022-07-19 LAB
ALBUMIN SERPL BCP-MCNC: 4.1 G/DL (ref 3.5–5)
ALBUMIN/GLOB SERPL: 1.2 {RATIO}
ALP SERPL-CCNC: 37 U/L (ref 45–115)
ALT SERPL W P-5'-P-CCNC: 41 U/L (ref 16–61)
AMPHET UR QL SCN: NEGATIVE
ANION GAP SERPL CALCULATED.3IONS-SCNC: 13 MMOL/L (ref 7–16)
AST SERPL W P-5'-P-CCNC: 26 U/L (ref 15–37)
BARBITURATES UR QL SCN: NEGATIVE
BASOPHILS # BLD AUTO: 0.04 K/UL (ref 0–0.2)
BASOPHILS NFR BLD AUTO: 0.9 % (ref 0–1)
BENZODIAZ METAB UR QL SCN: NEGATIVE
BILIRUB SERPL-MCNC: 1.3 MG/DL (ref 0–1.2)
BILIRUB UR QL STRIP: NEGATIVE
BUN SERPL-MCNC: 6 MG/DL (ref 7–18)
BUN/CREAT SERPL: 7 (ref 6–20)
CALCIUM SERPL-MCNC: 9.2 MG/DL (ref 8.5–10.1)
CANNABINOIDS UR QL SCN: POSITIVE
CHLORIDE SERPL-SCNC: 102 MMOL/L (ref 98–107)
CLARITY UR: CLEAR
CO2 SERPL-SCNC: 27 MMOL/L (ref 21–32)
COCAINE UR QL SCN: NEGATIVE
COLOR UR: ABNORMAL
CREAT SERPL-MCNC: 0.85 MG/DL (ref 0.7–1.3)
DIFFERENTIAL METHOD BLD: ABNORMAL
DIGOXIN SERPL-MCNC: 0.2 NG/ML (ref 0.8–2)
EOSINOPHIL # BLD AUTO: 0.05 K/UL (ref 0–0.5)
EOSINOPHIL NFR BLD AUTO: 1.1 % (ref 1–4)
ERYTHROCYTE [DISTWIDTH] IN BLOOD BY AUTOMATED COUNT: 12 % (ref 11.5–14.5)
GLOBULIN SER-MCNC: 3.3 G/DL (ref 2–4)
GLUCOSE SERPL-MCNC: 243 MG/DL (ref 70–105)
GLUCOSE SERPL-MCNC: 267 MG/DL (ref 74–106)
GLUCOSE UR STRIP-MCNC: 500 MG/DL
HCT VFR BLD AUTO: 46.2 % (ref 40–54)
HGB BLD-MCNC: 16.6 G/DL (ref 13.5–18)
IMM GRANULOCYTES # BLD AUTO: 0.02 K/UL (ref 0–0.04)
IMM GRANULOCYTES NFR BLD: 0.5 % (ref 0–0.4)
INR BLD: 1.23
KETONES UR STRIP-SCNC: 15 MG/DL
LEUKOCYTE ESTERASE UR QL STRIP: NEGATIVE
LYMPHOCYTES # BLD AUTO: 1.86 K/UL (ref 1–4.8)
LYMPHOCYTES NFR BLD AUTO: 42.1 % (ref 27–41)
MCH RBC QN AUTO: 31.1 PG (ref 27–31)
MCHC RBC AUTO-ENTMCNC: 35.9 G/DL (ref 32–36)
MCV RBC AUTO: 86.5 FL (ref 80–96)
MONOCYTES # BLD AUTO: 0.35 K/UL (ref 0–0.8)
MONOCYTES NFR BLD AUTO: 7.9 % (ref 2–6)
MPC BLD CALC-MCNC: 10.5 FL (ref 9.4–12.4)
NEUTROPHILS # BLD AUTO: 2.1 K/UL (ref 1.8–7.7)
NEUTROPHILS NFR BLD AUTO: 47.5 % (ref 53–65)
NITRITE UR QL STRIP: NEGATIVE
NT-PROBNP SERPL-MCNC: 35 PG/ML (ref 1–125)
OPIATES UR QL SCN: NEGATIVE
PCP UR QL SCN: NEGATIVE
PH UR STRIP: 5.5 PH UNITS
PLATELET # BLD AUTO: 235 K/UL (ref 150–400)
POTASSIUM SERPL-SCNC: 3.7 MMOL/L (ref 3.5–5.1)
PROT SERPL-MCNC: 7.4 G/DL (ref 6.4–8.2)
PROT UR QL STRIP: NEGATIVE
PROTHROMBIN TIME: 15.5 SECONDS (ref 11.7–14.7)
RBC # BLD AUTO: 5.34 M/UL (ref 4.6–6.2)
RBC # UR STRIP: NEGATIVE /UL
SODIUM SERPL-SCNC: 138 MMOL/L (ref 136–145)
SP GR UR STRIP: 1.02
TROPONIN I SERPL HS-MCNC: 6.3 PG/ML
UROBILINOGEN UR STRIP-ACNC: 0.2 MG/DL
WBC # BLD AUTO: 4.42 K/UL (ref 4.5–11)

## 2022-07-19 PROCEDURE — 96361 HYDRATE IV INFUSION ADD-ON: CPT

## 2022-07-19 PROCEDURE — 85025 COMPLETE CBC W/AUTO DIFF WBC: CPT | Performed by: EMERGENCY MEDICINE

## 2022-07-19 PROCEDURE — 80307 DRUG TEST PRSMV CHEM ANLYZR: CPT | Performed by: EMERGENCY MEDICINE

## 2022-07-19 PROCEDURE — 80162 ASSAY OF DIGOXIN TOTAL: CPT | Performed by: EMERGENCY MEDICINE

## 2022-07-19 PROCEDURE — 85610 PROTHROMBIN TIME: CPT | Performed by: EMERGENCY MEDICINE

## 2022-07-19 PROCEDURE — 82962 GLUCOSE BLOOD TEST: CPT

## 2022-07-19 PROCEDURE — 36415 COLL VENOUS BLD VENIPUNCTURE: CPT | Performed by: EMERGENCY MEDICINE

## 2022-07-19 PROCEDURE — 83880 ASSAY OF NATRIURETIC PEPTIDE: CPT | Performed by: EMERGENCY MEDICINE

## 2022-07-19 PROCEDURE — 96375 TX/PRO/DX INJ NEW DRUG ADDON: CPT

## 2022-07-19 PROCEDURE — 81003 URINALYSIS AUTO W/O SCOPE: CPT | Mod: 59 | Performed by: EMERGENCY MEDICINE

## 2022-07-19 PROCEDURE — 99283 EMERGENCY DEPT VISIT LOW MDM: CPT | Performed by: EMERGENCY MEDICINE

## 2022-07-19 PROCEDURE — 63600175 PHARM REV CODE 636 W HCPCS: Performed by: EMERGENCY MEDICINE

## 2022-07-19 PROCEDURE — 96374 THER/PROPH/DIAG INJ IV PUSH: CPT

## 2022-07-19 PROCEDURE — 99284 EMERGENCY DEPT VISIT MOD MDM: CPT | Mod: 25

## 2022-07-19 PROCEDURE — 25000003 PHARM REV CODE 250: Performed by: EMERGENCY MEDICINE

## 2022-07-19 PROCEDURE — 80053 COMPREHEN METABOLIC PANEL: CPT | Performed by: EMERGENCY MEDICINE

## 2022-07-19 PROCEDURE — 84484 ASSAY OF TROPONIN QUANT: CPT | Performed by: EMERGENCY MEDICINE

## 2022-07-19 RX ORDER — DILTIAZEM HYDROCHLORIDE 5 MG/ML
20 INJECTION INTRAVENOUS
Status: COMPLETED | OUTPATIENT
Start: 2022-07-19 | End: 2022-07-19

## 2022-07-19 RX ORDER — DIGOXIN 125 MCG
250 TABLET ORAL DAILY
Qty: 28 TABLET | Refills: 0 | Status: ON HOLD | OUTPATIENT
Start: 2022-07-19 | End: 2023-01-06 | Stop reason: CLARIF

## 2022-07-19 RX ORDER — DIGOXIN 0.25 MG/ML
250 INJECTION INTRAMUSCULAR; INTRAVENOUS
Status: COMPLETED | OUTPATIENT
Start: 2022-07-19 | End: 2022-07-19

## 2022-07-19 RX ADMIN — DILTIAZEM HYDROCHLORIDE 20 MG: 5 INJECTION INTRAVENOUS at 06:07

## 2022-07-19 RX ADMIN — SODIUM CHLORIDE 1000 ML: 9 INJECTION, SOLUTION INTRAVENOUS at 06:07

## 2022-07-19 RX ADMIN — DIGOXIN 250 MCG: 0.25 INJECTION INTRAMUSCULAR; INTRAVENOUS at 06:07

## 2022-07-19 NOTE — ED TRIAGE NOTES
Pt c/o palpitaions since early this morning when he was lying in bed. Also has had some mild intermittent chest discomfort that started yesterday evening. Denies sob or n/v. No diaphoresis. Pt states that he has a hx of a fib and chf. States that he takes his meds as prescribed and has not missed any doses.

## 2022-07-19 NOTE — ED PROVIDER NOTES
Encounter Date: 7/19/2022       History     Chief Complaint   Patient presents with    Palpitations     Patient presents with report of palpitations that started this morning.  Has a history of atrial fibrillation.  Takes digoxin, no shortness of breath or chest pain.  Patient sees Dr. Lockhart for Cardiology.        Review of patient's allergies indicates:   Allergen Reactions    Lisinopril Swelling     Past Medical History:   Diagnosis Date    A-fib     CHF (congestive heart failure)     Diabetes mellitus     Hypertension      Past Surgical History:   Procedure Laterality Date    CARDIOVERSION       Family History   Problem Relation Age of Onset    Diabetes Mother     Heart disease Mother     Diabetes Father     Heart disease Father      Social History     Tobacco Use    Smoking status: Former Smoker    Smokeless tobacco: Never Used   Substance Use Topics    Alcohol use: Yes     Comment: OCCASIONALLY     Drug use: Yes     Types: Marijuana     Review of Systems   Constitutional: Negative.    HENT: Negative.    Eyes: Negative.    Respiratory: Negative.    Cardiovascular: Positive for palpitations. Negative for leg swelling.   Gastrointestinal: Negative.    Genitourinary: Negative.    Musculoskeletal: Negative.    Skin: Negative.    Neurological: Negative.    Psychiatric/Behavioral: Negative.    All other systems reviewed and are negative.      Physical Exam     Initial Vitals [07/19/22 1717]   BP Pulse Resp Temp SpO2   (!) 131/90 (!) 127 11 98.4 °F (36.9 °C) 98 %      MAP       --         Physical Exam    Nursing note and vitals reviewed.  Constitutional: He appears well-developed and well-nourished.   HENT:   Head: Normocephalic.   Right Ear: External ear normal.   Left Ear: External ear normal.   Nose: Nose normal.   Mouth/Throat: Oropharynx is clear and moist.   Eyes: Conjunctivae and EOM are normal. Pupils are equal, round, and reactive to light. Right eye exhibits no discharge. Left eye  exhibits no discharge. No scleral icterus.   Neck: Neck supple. No tracheal deviation present. No JVD present.   Normal range of motion.  Cardiovascular: Regular rhythm, normal heart sounds and intact distal pulses. Tachycardia present.    No murmur heard.  Pulmonary/Chest: Breath sounds normal. No stridor. No respiratory distress. He has no wheezes. He has no rhonchi. He has no rales.   Abdominal: Abdomen is soft. Bowel sounds are normal. He exhibits no distension. There is no abdominal tenderness.   Musculoskeletal:         General: No tenderness or edema. Normal range of motion.      Cervical back: Normal range of motion and neck supple.     Lymphadenopathy:     He has no cervical adenopathy.   Neurological: He is alert and oriented to person, place, and time. He has normal strength. No cranial nerve deficit. GCS score is 15. GCS eye subscore is 4. GCS verbal subscore is 5. GCS motor subscore is 6.   Skin: Skin is warm and dry. Capillary refill takes less than 2 seconds. No rash noted. No erythema. No pallor.   Psychiatric: He has a normal mood and affect. His behavior is normal.         Medical Screening Exam   See Full Note    ED Course   Procedures  Labs Reviewed   COMPREHENSIVE METABOLIC PANEL - Abnormal; Notable for the following components:       Result Value    Glucose 267 (*)     BUN 6 (*)     Bilirubin, Total 1.3 (*)     Alk Phos 37 (*)     All other components within normal limits   PROTIME-INR - Abnormal; Notable for the following components:    PT 15.5 (*)     All other components within normal limits   CBC WITH DIFFERENTIAL - Abnormal; Notable for the following components:    WBC 4.42 (*)     MCH 31.1 (*)     Neutrophils % 47.5 (*)     Lymphocytes % 42.1 (*)     Monocytes % 7.9 (*)     Immature Granulocytes % 0.5 (*)     All other components within normal limits   DIGOXIN LEVEL - Abnormal; Notable for the following components:    Digoxin 0.2 (*)     All other components within normal limits   POCT  GLUCOSE MONITORING CONTINUOUS - Abnormal; Notable for the following components:    POC Glucose 243 (*)     All other components within normal limits   NT-PRO NATRIURETIC PEPTIDE - Normal   TROPONIN I - Normal   CBC W/ AUTO DIFFERENTIAL    Narrative:     The following orders were created for panel order CBC auto differential.  Procedure                               Abnormality         Status                     ---------                               -----------         ------                     CBC with Differential[[id number]]        Abnormal            Final result                 Please view results for these tests on the individual orders.   URINALYSIS, REFLEX TO URINE CULTURE   DRUG SCREEN, URINE (BEAKER)          Imaging Results          X-Ray Chest AP Portable (Final result)  Result time 07/19/22 18:26:40    Final result by Earle Bradford MD (07/19/22 18:26:40)                 Impression:      No acute cardiopulmonary process compared to the previous study      Electronically signed by: Earle Bradford  Date:    07/19/2022  Time:    18:26             Narrative:    EXAMINATION:  XR CHEST AP PORTABLE    CLINICAL HISTORY:  Palpitations;.    COMPARISON:  June 9, 2021    TECHNIQUE:  AP portable upright chest    FINDINGS:  The cardiomediastinal silhouette and pulmonary vasculature are unremarkable.  Lungs and pleural spaces are clear.  There is no acute osseous abnormality.                                 Medications   sodium chloride 0.9% bolus 1,000 mL (1,000 mLs Intravenous New Bag 7/19/22 1801)   digoxin injection 250 mcg (has no administration in time range)   diltiaZEM injection 20 mg (20 mg Intravenous Given 7/19/22 1801)     Medical Decision Making:   ED Management:  Patient was given IV diltiazem.  He was noted to have a subtherapeutic digoxin level and was given 250 mcg of digoxin IV as well.  Heart rate is  at this time.  Patient feels well.  Stable for discharge home.                    Clinical Impression:   Final diagnoses:  [I48.91, I48.92] Atrial fibrillation and flutter - With rapid rate. (Primary)          ED Disposition Condition    Discharge Stable        ED Prescriptions     Medication Sig Dispense Start Date End Date Auth. Provider    digoxin (LANOXIN) 125 mcg tablet Take 2 tablets (250 mcg total) by mouth once daily. Recheck with cardiologist for continuation of this medication for 14 days 28 tablet 7/19/2022 8/2/2022 Hay Gaston DO        Follow-up Information     Follow up With Specialties Details Why Contact Info    Jackie Long, SAPPHIRE, FNP-C Family Medicine Schedule an appointment as soon as possible for a visit in 1 day To recheck heart rate, repeat digoxin level in 3-5 days, get a referral for Cardiology reevaluation. [address] [facility]  [place] [address]  [phone]             Hay Gaston DO  07/19/22 3276

## 2022-07-20 ENCOUNTER — TELEPHONE (OUTPATIENT)
Dept: EMERGENCY MEDICINE | Facility: HOSPITAL | Age: 42
End: 2022-07-20
Payer: COMMERCIAL

## 2022-08-09 DIAGNOSIS — Z71.89 COMPLEX CARE COORDINATION: ICD-10-CM

## 2022-12-02 ENCOUNTER — HOSPITAL ENCOUNTER (EMERGENCY)
Facility: HOSPITAL | Age: 42
Discharge: HOME | End: 2022-12-02
Attending: INTERNAL MEDICINE
Payer: COMMERCIAL

## 2022-12-02 VITALS
RESPIRATION RATE: 17 BRPM | SYSTOLIC BLOOD PRESSURE: 126 MMHG | DIASTOLIC BLOOD PRESSURE: 89 MMHG | HEIGHT: 72 IN | BODY MASS INDEX: 28.76 KG/M2 | HEART RATE: 87 BPM | WEIGHT: 212.31 LBS | OXYGEN SATURATION: 99 % | TEMPERATURE: 98 F

## 2022-12-02 DIAGNOSIS — K64.5 THROMBOSED EXTERNAL HEMORRHOID: Primary | ICD-10-CM

## 2022-12-02 PROCEDURE — 99283 EMERGENCY DEPT VISIT LOW MDM: CPT | Performed by: INTERNAL MEDICINE

## 2022-12-02 PROCEDURE — 99284 EMERGENCY DEPT VISIT MOD MDM: CPT

## 2022-12-02 PROCEDURE — 96372 THER/PROPH/DIAG INJ SC/IM: CPT | Performed by: INTERNAL MEDICINE

## 2022-12-02 PROCEDURE — 63600175 PHARM REV CODE 636 W HCPCS: Performed by: INTERNAL MEDICINE

## 2022-12-02 RX ORDER — HYDROCORTISONE 25 MG/G
CREAM TOPICAL 2 TIMES DAILY
Qty: 1 EACH | Refills: 0 | OUTPATIENT
Start: 2022-12-02 | End: 2022-12-20

## 2022-12-02 RX ORDER — KETOROLAC TROMETHAMINE 15 MG/ML
15 INJECTION, SOLUTION INTRAMUSCULAR; INTRAVENOUS
Status: COMPLETED | OUTPATIENT
Start: 2022-12-02 | End: 2022-12-02

## 2022-12-02 RX ADMIN — KETOROLAC TROMETHAMINE 15 MG: 15 INJECTION, SOLUTION INTRAMUSCULAR; INTRAVENOUS at 05:12

## 2022-12-02 NOTE — ED TRIAGE NOTES
"PRESENTS WITH C/O HEMORRHOIDS. STATES THYE ARE "ALL SWOLE UP AND HURTIN ME. I AIN'T BEEN ABLE TO SLEEP FOR 2 NIGHTS"  "

## 2022-12-02 NOTE — ED PROVIDER NOTES
Encounter Date: 12/2/2022       History     Chief Complaint   Patient presents with    Hemorrhoids     Patient with hemorrhoids been hurting for the last 2 days.  HC his hemorrhoids off and on but he normally goes well known.  No rectal bleeding, no diarrhea constipation.  No nausea vomiting vomiting abdominal pain.      Review of patient's allergies indicates:   Allergen Reactions    Lisinopril Swelling     Past Medical History:   Diagnosis Date    A-fib     CHF (congestive heart failure)     Diabetes mellitus     Hypertension      Past Surgical History:   Procedure Laterality Date    CARDIOVERSION       Family History   Problem Relation Age of Onset    Diabetes Mother     Heart disease Mother     Diabetes Father     Heart disease Father      Social History     Tobacco Use    Smoking status: Former    Smokeless tobacco: Never   Substance Use Topics    Alcohol use: Yes     Comment: OCCASIONALLY     Drug use: Yes     Types: Marijuana     Review of Systems   Constitutional:  Negative for fever.   HENT:  Negative for sore throat.    Respiratory:  Negative for shortness of breath.    Cardiovascular:  Negative for chest pain.   Gastrointestinal:  Negative for nausea.   Genitourinary:  Negative for dysuria.   Musculoskeletal:  Negative for back pain.   Skin:  Negative for rash.   Neurological:  Negative for weakness.   Hematological:  Does not bruise/bleed easily.     Physical Exam     Initial Vitals [12/02/22 0447]   BP Pulse Resp Temp SpO2   126/89 87 17 98.2 °F (36.8 °C) 99 %      MAP       --         Physical Exam    Nursing note and vitals reviewed.  Constitutional: He appears well-developed.   HENT:   Head: Normocephalic.   Eyes: Pupils are equal, round, and reactive to light.   Neck:   Normal range of motion.  Cardiovascular:  Normal rate.           Pulmonary/Chest: Breath sounds normal.   Abdominal: Abdomen is soft.   Genitourinary: Rectum:      External hemorrhoid present.     Musculoskeletal:         General:  Normal range of motion.      Cervical back: Normal range of motion.     Neurological: He is alert. He has normal reflexes.   Skin: Skin is warm.       Medical Screening Exam   See Full Note    ED Course   Procedures  Labs Reviewed - No data to display       Imaging Results    None          Medications   ketorolac injection 15 mg (has no administration in time range)     Medical Decision Making:   ED Management:  Small external hemorrhoid will need surgical evaluation.  Symptomatic treatment includes his bath.  Will give Anusol for some relief.                 Clinical Impression:   Final diagnoses:  [K64.5] Thrombosed external hemorrhoid (Primary)      ED Disposition Condition    Discharge Stable          ED Prescriptions       Medication Sig Dispense Start Date End Date Auth. Provider    hydrocortisone 2.5 % cream Apply topically 2 (two) times daily. 1 each 12/2/2022 -- Bharat Bullard MD          Follow-up Information       Follow up With Specialties Details Why Contact Info    Jackie Long DNP, FNP-C [facility]  [address] [facility]  [facility] [address]  [phone]               Bharat Bullard MD  12/02/22 0510

## 2022-12-20 ENCOUNTER — HOSPITAL ENCOUNTER (EMERGENCY)
Facility: HOSPITAL | Age: 42
Discharge: HOME | End: 2022-12-20
Attending: EMERGENCY MEDICINE
Payer: COMMERCIAL

## 2022-12-20 VITALS
HEART RATE: 105 BPM | BODY MASS INDEX: 28.31 KG/M2 | DIASTOLIC BLOOD PRESSURE: 81 MMHG | TEMPERATURE: 98 F | SYSTOLIC BLOOD PRESSURE: 116 MMHG | OXYGEN SATURATION: 97 % | HEIGHT: 72 IN | WEIGHT: 209 LBS | RESPIRATION RATE: 18 BRPM

## 2022-12-20 DIAGNOSIS — M79.662 PAIN AND SWELLING OF LEFT LOWER LEG: ICD-10-CM

## 2022-12-20 DIAGNOSIS — M79.89 PAIN AND SWELLING OF RIGHT LOWER LEG: ICD-10-CM

## 2022-12-20 DIAGNOSIS — E11.621 DIABETIC ULCER OF TOE OF RIGHT FOOT ASSOCIATED WITH TYPE 2 DIABETES MELLITUS, LIMITED TO BREAKDOWN OF SKIN: ICD-10-CM

## 2022-12-20 DIAGNOSIS — E86.9 VOLUME DEPLETION: ICD-10-CM

## 2022-12-20 DIAGNOSIS — M79.89 PAIN AND SWELLING OF LEFT LOWER LEG: ICD-10-CM

## 2022-12-20 DIAGNOSIS — M79.661 PAIN AND SWELLING OF RIGHT LOWER LEG: ICD-10-CM

## 2022-12-20 DIAGNOSIS — R00.0 TACHYCARDIA: ICD-10-CM

## 2022-12-20 DIAGNOSIS — I10 PRIMARY HYPERTENSION: ICD-10-CM

## 2022-12-20 DIAGNOSIS — E11.65 UNCONTROLLED TYPE 2 DIABETES MELLITUS WITH HYPERGLYCEMIA: Primary | ICD-10-CM

## 2022-12-20 DIAGNOSIS — L97.511 DIABETIC ULCER OF TOE OF RIGHT FOOT ASSOCIATED WITH TYPE 2 DIABETES MELLITUS, LIMITED TO BREAKDOWN OF SKIN: ICD-10-CM

## 2022-12-20 LAB
ALBUMIN SERPL BCP-MCNC: 3.5 G/DL (ref 3.5–5)
ALBUMIN/GLOB SERPL: 0.8 {RATIO}
ALP SERPL-CCNC: 57 U/L (ref 45–115)
ALT SERPL W P-5'-P-CCNC: 20 U/L (ref 16–61)
AMPHET UR QL SCN: NEGATIVE
AMYLASE SERPL-CCNC: 34 U/L (ref 25–115)
ANION GAP SERPL CALCULATED.3IONS-SCNC: 12 MMOL/L (ref 7–16)
AST SERPL W P-5'-P-CCNC: 12 U/L (ref 15–37)
BARBITURATES UR QL SCN: NEGATIVE
BASOPHILS # BLD AUTO: 0.03 K/UL (ref 0–0.2)
BASOPHILS NFR BLD AUTO: 0.3 % (ref 0–1)
BENZODIAZ METAB UR QL SCN: NEGATIVE
BILIRUB SERPL-MCNC: 1.3 MG/DL (ref ?–1.2)
BILIRUB UR QL STRIP: NEGATIVE
BUN SERPL-MCNC: 10 MG/DL (ref 7–18)
BUN/CREAT SERPL: 8 (ref 6–20)
CALCIUM SERPL-MCNC: 9.5 MG/DL (ref 8.5–10.1)
CANNABINOIDS UR QL SCN: POSITIVE
CHLORIDE SERPL-SCNC: 98 MMOL/L (ref 98–107)
CLARITY UR: CLEAR
CO2 SERPL-SCNC: 28 MMOL/L (ref 21–32)
COCAINE UR QL SCN: NEGATIVE
COLOR UR: YELLOW
CREAT SERPL-MCNC: 1.24 MG/DL (ref 0.7–1.3)
DIFFERENTIAL METHOD BLD: ABNORMAL
EGFR (NO RACE VARIABLE) (RUSH/TITUS): 75 ML/MIN/1.73M²
EOSINOPHIL # BLD AUTO: 0.03 K/UL (ref 0–0.5)
EOSINOPHIL NFR BLD AUTO: 0.3 % (ref 1–4)
ERYTHROCYTE [DISTWIDTH] IN BLOOD BY AUTOMATED COUNT: 12.7 % (ref 11.5–14.5)
GLOBULIN SER-MCNC: 4.4 G/DL (ref 2–4)
GLUCOSE SERPL-MCNC: 198 MG/DL (ref 70–105)
GLUCOSE SERPL-MCNC: 427 MG/DL (ref 70–105)
GLUCOSE SERPL-MCNC: 513 MG/DL (ref 74–106)
GLUCOSE UR STRIP-MCNC: >=1000 MG/DL
HCT VFR BLD AUTO: 43.6 % (ref 40–54)
HGB BLD-MCNC: 15.5 G/DL (ref 13.5–18)
IMM GRANULOCYTES # BLD AUTO: 0.03 K/UL (ref 0–0.04)
IMM GRANULOCYTES NFR BLD: 0.3 % (ref 0–0.4)
KETONES UR STRIP-SCNC: NEGATIVE MG/DL
LEUKOCYTE ESTERASE UR QL STRIP: NEGATIVE
LIPASE SERPL-CCNC: 66 U/L (ref 73–393)
LYMPHOCYTES # BLD AUTO: 1.9 K/UL (ref 1–4.8)
LYMPHOCYTES NFR BLD AUTO: 18.8 % (ref 27–41)
MAGNESIUM SERPL-MCNC: 1.7 MG/DL (ref 1.7–2.3)
MCH RBC QN AUTO: 31.1 PG (ref 27–31)
MCHC RBC AUTO-ENTMCNC: 35.6 G/DL (ref 32–36)
MCV RBC AUTO: 87.4 FL (ref 80–96)
MONOCYTES # BLD AUTO: 0.88 K/UL (ref 0–0.8)
MONOCYTES NFR BLD AUTO: 8.7 % (ref 2–6)
MPC BLD CALC-MCNC: 10.5 FL (ref 9.4–12.4)
NEUTROPHILS # BLD AUTO: 7.22 K/UL (ref 1.8–7.7)
NEUTROPHILS NFR BLD AUTO: 71.6 % (ref 53–65)
NITRITE UR QL STRIP: NEGATIVE
OPIATES UR QL SCN: NEGATIVE
PCP UR QL SCN: NEGATIVE
PH UR STRIP: 5.5 PH UNITS
PLATELET # BLD AUTO: 292 K/UL (ref 150–400)
POTASSIUM SERPL-SCNC: 4.5 MMOL/L (ref 3.5–5.1)
PROT SERPL-MCNC: 7.9 G/DL (ref 6.4–8.2)
PROT UR QL STRIP: NEGATIVE
RBC # BLD AUTO: 4.99 M/UL (ref 4.6–6.2)
RBC # UR STRIP: NEGATIVE /UL
SODIUM SERPL-SCNC: 133 MMOL/L (ref 136–145)
SP GR UR STRIP: <=1.005
UROBILINOGEN UR STRIP-ACNC: 0.2 MG/DL
WBC # BLD AUTO: 10.09 K/UL (ref 4.5–11)

## 2022-12-20 PROCEDURE — 63600175 PHARM REV CODE 636 W HCPCS: Performed by: EMERGENCY MEDICINE

## 2022-12-20 PROCEDURE — 82962 GLUCOSE BLOOD TEST: CPT | Mod: 91

## 2022-12-20 PROCEDURE — 96365 THER/PROPH/DIAG IV INF INIT: CPT

## 2022-12-20 PROCEDURE — 80307 DRUG TEST PRSMV CHEM ANLYZR: CPT | Performed by: EMERGENCY MEDICINE

## 2022-12-20 PROCEDURE — 93005 ELECTROCARDIOGRAM TRACING: CPT

## 2022-12-20 PROCEDURE — 93010 EKG 12-LEAD: ICD-10-PCS | Mod: ,,, | Performed by: INTERNAL MEDICINE

## 2022-12-20 PROCEDURE — 99285 EMERGENCY DEPT VISIT HI MDM: CPT | Mod: 25

## 2022-12-20 PROCEDURE — 96361 HYDRATE IV INFUSION ADD-ON: CPT

## 2022-12-20 PROCEDURE — 80053 COMPREHEN METABOLIC PANEL: CPT | Performed by: EMERGENCY MEDICINE

## 2022-12-20 PROCEDURE — 93010 ELECTROCARDIOGRAM REPORT: CPT | Mod: ,,, | Performed by: INTERNAL MEDICINE

## 2022-12-20 PROCEDURE — 96375 TX/PRO/DX INJ NEW DRUG ADDON: CPT

## 2022-12-20 PROCEDURE — 81003 URINALYSIS AUTO W/O SCOPE: CPT | Mod: 59 | Performed by: EMERGENCY MEDICINE

## 2022-12-20 PROCEDURE — 25000003 PHARM REV CODE 250: Performed by: EMERGENCY MEDICINE

## 2022-12-20 PROCEDURE — 83735 ASSAY OF MAGNESIUM: CPT | Performed by: EMERGENCY MEDICINE

## 2022-12-20 PROCEDURE — 99284 EMERGENCY DEPT VISIT MOD MDM: CPT | Performed by: EMERGENCY MEDICINE

## 2022-12-20 PROCEDURE — 85025 COMPLETE CBC W/AUTO DIFF WBC: CPT | Performed by: EMERGENCY MEDICINE

## 2022-12-20 PROCEDURE — 96376 TX/PRO/DX INJ SAME DRUG ADON: CPT

## 2022-12-20 PROCEDURE — 82150 ASSAY OF AMYLASE: CPT | Performed by: EMERGENCY MEDICINE

## 2022-12-20 PROCEDURE — 83690 ASSAY OF LIPASE: CPT | Performed by: EMERGENCY MEDICINE

## 2022-12-20 RX ORDER — AMOXICILLIN AND CLAVULANATE POTASSIUM 875; 125 MG/1; MG/1
1 TABLET, FILM COATED ORAL 2 TIMES DAILY
Qty: 20 TABLET | Refills: 0 | Status: ON HOLD | OUTPATIENT
Start: 2022-12-20 | End: 2022-12-30

## 2022-12-20 RX ORDER — METOPROLOL TARTRATE 1 MG/ML
5 INJECTION, SOLUTION INTRAVENOUS
Status: COMPLETED | OUTPATIENT
Start: 2022-12-20 | End: 2022-12-20

## 2022-12-20 RX ORDER — METOPROLOL TARTRATE 25 MG/1
25 TABLET, FILM COATED ORAL 2 TIMES DAILY
Qty: 60 TABLET | Refills: 3 | Status: ON HOLD | OUTPATIENT
Start: 2022-12-20 | End: 2023-01-06 | Stop reason: SDUPTHER

## 2022-12-20 RX ORDER — ACETAMINOPHEN 500 MG
1000 TABLET ORAL
Status: COMPLETED | OUTPATIENT
Start: 2022-12-20 | End: 2022-12-20

## 2022-12-20 RX ORDER — DILTIAZEM HYDROCHLORIDE 5 MG/ML
20 INJECTION INTRAVENOUS
Status: DISCONTINUED | OUTPATIENT
Start: 2022-12-20 | End: 2022-12-20

## 2022-12-20 RX ADMIN — ACETAMINOPHEN 1000 MG: 500 TABLET, FILM COATED ORAL at 03:12

## 2022-12-20 RX ADMIN — SODIUM CHLORIDE 1000 ML: 9 INJECTION, SOLUTION INTRAVENOUS at 03:12

## 2022-12-20 RX ADMIN — HUMAN INSULIN 10 UNITS: 100 INJECTION, SOLUTION SUBCUTANEOUS at 01:12

## 2022-12-20 RX ADMIN — SODIUM CHLORIDE 1000 ML: 9 INJECTION, SOLUTION INTRAVENOUS at 12:12

## 2022-12-20 RX ADMIN — METOPROLOL TARTRATE 5 MG: 1 INJECTION, SOLUTION INTRAVENOUS at 04:12

## 2022-12-20 RX ADMIN — CEFTRIAXONE SODIUM 1 G: 1 INJECTION, POWDER, FOR SOLUTION INTRAMUSCULAR; INTRAVENOUS at 04:12

## 2022-12-20 RX ADMIN — HUMAN INSULIN 10 UNITS: 100 INJECTION, SOLUTION SUBCUTANEOUS at 12:12

## 2022-12-20 NOTE — ED NOTES
Patient resting in bed with family at bedside-discussed ways of obtaining medications and importance of taking as prescribed verbalized understanding

## 2022-12-20 NOTE — ED NOTES
Per Dr Marilin schaeffer [facility] d/t low bp and see if fluid bolus helps lower heart rate

## 2022-12-20 NOTE — ED PROVIDER NOTES
"Encounter Date: 12/20/2022       History     Chief Complaint   Patient presents with    Leg Pain     Patient presents "not feeling well", reports he has shooting pains in both lower extremities down to his feet, and when he walks he states it feels like he is walking on "cushions".  Symptoms present for 1-2 months but worse for the past 2 days.  Patient states "my insurance got all messed up this month", states he has been unable to get his diabetes treatment medications.    Review of patient's allergies indicates:   Allergen Reactions    Lisinopril Swelling     Past Medical History:   Diagnosis Date    A-fib     CHF (congestive heart failure)     Diabetes mellitus     Hypertension      Past Surgical History:   Procedure Laterality Date    CARDIOVERSION       Family History   Problem Relation Age of Onset    Diabetes Mother     Heart disease Mother     Diabetes Father     Heart disease Father      Social History     Tobacco Use    Smoking status: Former    Smokeless tobacco: Never   Substance Use Topics    Alcohol use: Yes     Comment: OCCASIONALLY     Drug use: Yes     Types: Marijuana     Review of Systems   Constitutional: Negative.  Negative for fever.   HENT:  Negative for congestion and trouble swallowing.         Oral mucosa reportedly dry   Eyes: Negative.    Respiratory: Negative.     Cardiovascular: Negative.    Gastrointestinal: Negative.    Endocrine: Negative for polydipsia, polyphagia and polyuria.   Genitourinary:  Positive for frequency. Negative for dysuria and hematuria.   Musculoskeletal: Negative.    Skin: Negative.    Neurological: Negative.    Hematological: Negative.    Psychiatric/Behavioral: Negative.     All other systems reviewed and are negative.    Physical Exam     Initial Vitals [12/20/22 1159]   BP Pulse Resp Temp SpO2   128/60 (!) 154 20 97.9 °F (36.6 °C) 98 %      MAP       --         Physical Exam    Nursing note and vitals reviewed.  Constitutional: He appears well-developed and " well-nourished.   HENT:   Head: Normocephalic.   Right Ear: External ear normal.   Left Ear: External ear normal.   Nose: Nose normal.   Mouth/Throat: Oropharynx is clear and moist. No oropharyngeal exudate.   Eyes: Conjunctivae and EOM are normal. Pupils are equal, round, and reactive to light.   Neck: Neck supple. No JVD present.   Normal range of motion.  Cardiovascular:  Normal rate, regular rhythm, normal heart sounds and intact distal pulses.           No murmur heard.  Pulmonary/Chest: Breath sounds normal. No stridor. No respiratory distress. He has no wheezes. He has no rhonchi. He has no rales.   Abdominal: Abdomen is soft. Bowel sounds are normal. He exhibits no distension. There is no abdominal tenderness.   Musculoskeletal:      Cervical back: Normal range of motion and neck supple.        Feet:      Lymphadenopathy:     He has no cervical adenopathy.   Neurological: He is alert and oriented to person, place, and time. He has normal strength. No cranial nerve deficit. GCS score is 15. GCS eye subscore is 4. GCS verbal subscore is 5. GCS motor subscore is 6.   Skin: Skin is warm and dry. Capillary refill takes less than 2 seconds. No rash noted. No erythema. No pallor.   Psychiatric: He has a normal mood and affect. His behavior is normal.       Medical Screening Exam   See Full Note    ED Course   Procedures  Labs Reviewed   COMPREHENSIVE METABOLIC PANEL - Abnormal; Notable for the following components:       Result Value    Sodium 133 (*)     Glucose 513 (*)     Globulin 4.4 (*)     Bilirubin, Total 1.3 (*)     AST 12 (*)     All other components within normal limits   DRUG SCREEN, URINE (BEAKER) - Abnormal; Notable for the following components:    Cannabinoid, Urine Positive (*)     All other components within normal limits    Narrative:     The results of screening tests should be considered presumptive. Confirmatory testing is available upon request.    Cutoff Points:  PCP:         25ng/mL  AMPH:         500ng/mL  JOHNATHAN:        200ng/mL  LYNN:        200ng/mL  THC:         50ng/mL  ARIAN:         300ng/mL  OPI:         2000ng/mL   LIPASE - Abnormal; Notable for the following components:    Lipase 66 (*)     All other components within normal limits   URINALYSIS, REFLEX TO URINE CULTURE - Abnormal; Notable for the following components:    Glucose, UA >=1000 (*)     All other components within normal limits   CBC WITH DIFFERENTIAL - Abnormal; Notable for the following components:    MCH 31.1 (*)     Neutrophils % 71.6 (*)     Lymphocytes % 18.8 (*)     Monocytes % 8.7 (*)     Eosinophils % 0.3 (*)     Monocytes, Absolute 0.88 (*)     All other components within normal limits   POCT GLUCOSE MONITORING CONTINUOUS - Abnormal; Notable for the following components:    POC Glucose 427 (*)     All other components within normal limits   POCT GLUCOSE MONITORING CONTINUOUS - Abnormal; Notable for the following components:    POC Glucose 198 (*)     All other components within normal limits   AMYLASE - Normal   MAGNESIUM - Normal   CBC W/ AUTO DIFFERENTIAL    Narrative:     The following orders were created for panel order CBC auto differential.  Procedure                               Abnormality         Status                     ---------                               -----------         ------                     CBC with Differential[[id number]]        Abnormal            Final result                 Please view results for these tests on the individual orders.   POCT GLUCOSE MONITORING CONTINUOUS   POCT GLUCOSE MONITORING CONTINUOUS   POCT GLUCOSE MONITORING CONTINUOUS        ECG Results              Repeat EKG 12-lead (In process)  Result time 12/20/22 15:29:01      In process by Interface, Lab [facility] (12/20/22 15:29:01)                   Narrative:    Test Reason : R00.0,    Vent. Rate : 124 BPM     Atrial Rate : 124 BPM     P-R Int : 200 ms          QRS Dur : 106 ms      QT Int : 314 ms       P-R-T Axes : 090 009  -41 degrees     QTc Int : 451 ms    Sinus tachycardia  Nonspecific T wave abnormality  Abnormal ECG  When compared with ECG of 19-JUL-2022 17:24,  Sinus rhythm has replaced Atrial flutter  Nonspecific T wave abnormality has replaced inverted T waves in Inferior  leads    Referred By: [email or web address]   SELF           Confirmed By:                                   Imaging Results              US Lower Extremity Veins Bilateral (Final result)  Result time 12/20/22 15:41:12      Final result by Luis Farah MD (12/20/22 15:41:12)                   Impression:      No evidence of deep venous thrombosis in either lower extremity.      Electronically signed by: Luis Farah  Date:    12/20/2022  Time:    15:41               Narrative:    EXAMINATION:  US LOWER EXTREMITY VEINS BILATERAL    CLINICAL HISTORY:  Pain in left lower leg    TECHNIQUE:  Duplex and color flow Doppler and dynamic compression was performed of the bilateral lower extremity veins.  Ultrasound images captured and stored.    COMPARISON:  None    FINDINGS:  Right thigh veins: The common femoral, femoral, popliteal, upper greater saphenous, and deep femoral veins are patent and free of thrombus. The veins are normally compressible and have normal phasic flow and augmentation response.    Right calf veins: The visualized calf veins are patent.    Left thigh veins: The common femoral, femoral, popliteal, upper greater saphenous, and deep femoral veins are patent and free of thrombus. The veins are normally compressible and have normal phasic flow and augmentation response.    Left calf veins: The visualized calf veins are patent.    Miscellaneous: None                                       Medications   cefTRIAXone (ROCEPHIN) 1 g in dextrose 5 % in water (D5W) 5 % 50 mL IVPB (MB+) (has no administration in time range)   sodium chloride 0.9% bolus 1,000 mL 1,000 mL (0 mLs Intravenous Stopped 12/20/22 1335)   insulin regular injection 10 Units 0.1 mL (10 Units  Intravenous Given 12/20/22 1235)   insulin regular injection 10 Units 0.1 mL (10 Units Intravenous Given 12/20/22 1324)   acetaminophen tablet 1,000 mg (1,000 mg Oral Given 12/20/22 1517)   sodium chloride 0.9% bolus 1,000 mL 1,000 mL (1,000 mLs Intravenous New Bag 12/20/22 1518)   metoprolol injection 5 mg (5 mg Intravenous Given 12/20/22 1633)     Medical Decision Making:   Clinical Tests:   Lab Tests: Reviewed  Radiological Study: Reviewed radiologist's report for bilateral venous Doppler ultrasound, indicates no deep vein thrombosis.                 Clinical Impression:   Final diagnoses:  [R00.0] Tachycardia  [M79.662, M79.89] Pain and swelling of left lower leg  [M79.661, M79.89] Pain and swelling of right lower leg  [E11.65] Uncontrolled type 2 diabetes mellitus with hyperglycemia (Primary)  [E11.621, L97.511] Diabetic ulcer of toe of right foot associated with type 2 diabetes mellitus, limited to breakdown of skin  [E86.9] Volume depletion - Due to uncontrolled type 2 diabetes        ED Disposition Condition    Discharge Stable          ED Prescriptions       Medication Sig Dispense Start Date End Date Auth. Provider    metoprolol tartrate (LOPRESSOR) 25 MG tablet Take 1 tablet (25 mg total) by mouth 2 (two) times daily. 60 tablet 12/20/2022 12/20/2023 Hay Gaston DO    amoxicillin-clavulanate 875-125mg (AUGMENTIN) 875-125 mg per tablet Take 1 tablet by mouth 2 (two) times daily. for 10 days 20 tablet 12/20/2022 12/30/2022 Hay Gaston DO          Follow-up Information       Follow up With Specialties Details Why Contact Info    Jackie Long DNP, FNP-C Family Medicine Schedule an appointment as soon as possible for a visit in 1 day To recheck; sooner if worse, not improving, or if any new symptoms.  Review your blood sugar results and medications with your primary care provider for better control of diabetes. [address] [facility]  [place] [id number]  [phone]                Hay Arnulfo Gaston,   12/20/22 1630

## 2022-12-21 LAB — GLUCOSE SERPL-MCNC: 553 MG/DL (ref 70–105)

## 2022-12-30 ENCOUNTER — HOSPITAL ENCOUNTER (INPATIENT)
Facility: HOSPITAL | Age: 42
LOS: 7 days | Discharge: HOME HEALTH | DRG: 556 | End: 2023-01-06
Attending: SPECIALIST | Admitting: SPECIALIST
Payer: MEDICARE

## 2022-12-30 DIAGNOSIS — S98.139A: ICD-10-CM

## 2022-12-30 DIAGNOSIS — I10 PRIMARY HYPERTENSION: ICD-10-CM

## 2022-12-30 LAB
ANION GAP SERPL CALCULATED.3IONS-SCNC: 10 MMOL/L (ref 7–16)
BASOPHILS # BLD AUTO: 0.03 K/UL (ref 0–0.2)
BASOPHILS NFR BLD AUTO: 0.4 % (ref 0–1)
BILIRUB UR QL STRIP: NEGATIVE
BUN SERPL-MCNC: 4 MG/DL (ref 7–18)
BUN/CREAT SERPL: 5 (ref 6–20)
CALCIUM SERPL-MCNC: 8.6 MG/DL (ref 8.5–10.1)
CHLORIDE SERPL-SCNC: 106 MMOL/L (ref 98–107)
CLARITY UR: CLEAR
CO2 SERPL-SCNC: 28 MMOL/L (ref 21–32)
COLOR UR: YELLOW
CREAT SERPL-MCNC: 0.8 MG/DL (ref 0.7–1.3)
DIFFERENTIAL METHOD BLD: ABNORMAL
EGFR (NO RACE VARIABLE) (RUSH/TITUS): 114 ML/MIN/1.73M²
EOSINOPHIL # BLD AUTO: 0.18 K/UL (ref 0–0.5)
EOSINOPHIL NFR BLD AUTO: 2.7 % (ref 1–4)
ERYTHROCYTE [DISTWIDTH] IN BLOOD BY AUTOMATED COUNT: 11.9 % (ref 11.5–14.5)
GLUCOSE SERPL-MCNC: 157 MG/DL (ref 74–106)
GLUCOSE SERPL-MCNC: 192 MG/DL (ref 70–105)
GLUCOSE SERPL-MCNC: 236 MG/DL (ref 70–105)
GLUCOSE UR STRIP-MCNC: NEGATIVE MG/DL
HCT VFR BLD AUTO: 35.9 % (ref 40–54)
HGB BLD-MCNC: 12.5 G/DL (ref 13.5–18)
IMM GRANULOCYTES # BLD AUTO: 0.11 K/UL (ref 0–0.04)
IMM GRANULOCYTES NFR BLD: 1.6 % (ref 0–0.4)
KETONES UR STRIP-SCNC: NEGATIVE MG/DL
LEUKOCYTE ESTERASE UR QL STRIP: NEGATIVE
LYMPHOCYTES # BLD AUTO: 2.04 K/UL (ref 1–4.8)
LYMPHOCYTES NFR BLD AUTO: 30.5 % (ref 27–41)
LYMPHOCYTES NFR BLD MANUAL: 28 % (ref 27–41)
MCH RBC QN AUTO: 30.2 PG (ref 27–31)
MCHC RBC AUTO-ENTMCNC: 34.8 G/DL (ref 32–36)
MCV RBC AUTO: 86.7 FL (ref 80–96)
MONOCYTES # BLD AUTO: 0.64 K/UL (ref 0–0.8)
MONOCYTES NFR BLD AUTO: 9.6 % (ref 2–6)
MONOCYTES NFR BLD MANUAL: 11 % (ref 2–6)
MPC BLD CALC-MCNC: 8.8 FL (ref 9.4–12.4)
NEUTROPHILS # BLD AUTO: 3.68 K/UL (ref 1.8–7.7)
NEUTROPHILS NFR BLD AUTO: 55.2 % (ref 53–65)
NEUTS BAND NFR BLD MANUAL: 4 % (ref 1–5)
NEUTS SEG NFR BLD MANUAL: 57 % (ref 50–62)
NITRITE UR QL STRIP: NEGATIVE
NRBC BLD MANUAL-RTO: ABNORMAL %
PH UR STRIP: 6.5 PH UNITS
PLATELET # BLD AUTO: 466 K/UL (ref 150–400)
PLATELET MORPHOLOGY: ABNORMAL
POTASSIUM SERPL-SCNC: 3.1 MMOL/L (ref 3.5–5.1)
PROT UR QL STRIP: NEGATIVE
RBC # BLD AUTO: 4.14 M/UL (ref 4.6–6.2)
RBC # UR STRIP: NEGATIVE /UL
RBC MORPH BLD: NORMAL
SODIUM SERPL-SCNC: 141 MMOL/L (ref 136–145)
SP GR UR STRIP: 1.02
UROBILINOGEN UR STRIP-ACNC: 0.2 MG/DL
WBC # BLD AUTO: 6.68 K/UL (ref 4.5–11)

## 2022-12-30 PROCEDURE — 97161 PT EVAL LOW COMPLEX 20 MIN: CPT

## 2022-12-30 PROCEDURE — 85025 COMPLETE CBC W/AUTO DIFF WBC: CPT | Performed by: SPECIALIST

## 2022-12-30 PROCEDURE — 11000004 HC SNF PRIVATE

## 2022-12-30 PROCEDURE — 80048 BASIC METABOLIC PNL TOTAL CA: CPT | Performed by: SPECIALIST

## 2022-12-30 PROCEDURE — 97165 OT EVAL LOW COMPLEX 30 MIN: CPT

## 2022-12-30 PROCEDURE — 83036 HEMOGLOBIN GLYCOSYLATED A1C: CPT | Performed by: SPECIALIST

## 2022-12-30 PROCEDURE — 82962 GLUCOSE BLOOD TEST: CPT

## 2022-12-30 PROCEDURE — 63600175 PHARM REV CODE 636 W HCPCS: Performed by: SPECIALIST

## 2022-12-30 PROCEDURE — 25000003 PHARM REV CODE 250: Performed by: SPECIALIST

## 2022-12-30 PROCEDURE — 36415 COLL VENOUS BLD VENIPUNCTURE: CPT | Performed by: SPECIALIST

## 2022-12-30 PROCEDURE — 81003 URINALYSIS AUTO W/O SCOPE: CPT | Performed by: SPECIALIST

## 2022-12-30 PROCEDURE — 94761 N-INVAS EAR/PLS OXIMETRY MLT: CPT

## 2022-12-30 RX ORDER — GLUCAGON 1 MG
1 KIT INJECTION
Status: DISCONTINUED | OUTPATIENT
Start: 2022-12-30 | End: 2023-01-06 | Stop reason: HOSPADM

## 2022-12-30 RX ORDER — PANTOPRAZOLE SODIUM 40 MG/1
40 TABLET, DELAYED RELEASE ORAL DAILY
Status: DISCONTINUED | OUTPATIENT
Start: 2022-12-30 | End: 2023-01-06 | Stop reason: HOSPADM

## 2022-12-30 RX ORDER — METOPROLOL TARTRATE 50 MG/1
50 TABLET ORAL 2 TIMES DAILY
Status: DISCONTINUED | OUTPATIENT
Start: 2022-12-30 | End: 2023-01-06 | Stop reason: HOSPADM

## 2022-12-30 RX ORDER — IBUPROFEN 200 MG
16 TABLET ORAL
Status: DISCONTINUED | OUTPATIENT
Start: 2022-12-30 | End: 2023-01-06 | Stop reason: HOSPADM

## 2022-12-30 RX ORDER — GABAPENTIN 100 MG/1
400 CAPSULE ORAL 3 TIMES DAILY
COMMUNITY
Start: 2022-12-30 | End: 2023-09-18

## 2022-12-30 RX ORDER — GABAPENTIN 100 MG/1
100 CAPSULE ORAL 3 TIMES DAILY
Status: CANCELLED | OUTPATIENT
Start: 2022-12-30

## 2022-12-30 RX ORDER — TALC
6 POWDER (GRAM) TOPICAL NIGHTLY PRN
Status: DISCONTINUED | OUTPATIENT
Start: 2022-12-30 | End: 2023-01-06 | Stop reason: HOSPADM

## 2022-12-30 RX ORDER — GABAPENTIN 100 MG/1
100 CAPSULE ORAL 3 TIMES DAILY
Status: DISCONTINUED | OUTPATIENT
Start: 2022-12-30 | End: 2023-01-06 | Stop reason: HOSPADM

## 2022-12-30 RX ORDER — ATORVASTATIN CALCIUM 10 MG/1
10 TABLET, FILM COATED ORAL NIGHTLY
Status: DISCONTINUED | OUTPATIENT
Start: 2022-12-30 | End: 2023-01-06 | Stop reason: HOSPADM

## 2022-12-30 RX ORDER — CALCIUM CARBONATE 200(500)MG
500 TABLET,CHEWABLE ORAL 2 TIMES DAILY PRN
Status: DISCONTINUED | OUTPATIENT
Start: 2022-12-30 | End: 2023-01-06 | Stop reason: HOSPADM

## 2022-12-30 RX ORDER — METFORMIN HYDROCHLORIDE 500 MG/1
500 TABLET ORAL 2 TIMES DAILY WITH MEALS
Status: DISCONTINUED | OUTPATIENT
Start: 2022-12-30 | End: 2023-01-06 | Stop reason: HOSPADM

## 2022-12-30 RX ORDER — AMOXICILLIN 250 MG
1 CAPSULE ORAL 2 TIMES DAILY
Status: DISCONTINUED | OUTPATIENT
Start: 2022-12-30 | End: 2023-01-06 | Stop reason: HOSPADM

## 2022-12-30 RX ORDER — INSULIN ASPART 100 [IU]/ML
0-5 INJECTION, SOLUTION INTRAVENOUS; SUBCUTANEOUS
Status: DISCONTINUED | OUTPATIENT
Start: 2022-12-30 | End: 2023-01-06 | Stop reason: HOSPADM

## 2022-12-30 RX ORDER — CEPHALEXIN 250 MG/1
500 CAPSULE ORAL 4 TIMES DAILY
Status: DISCONTINUED | OUTPATIENT
Start: 2022-12-30 | End: 2023-01-06 | Stop reason: HOSPADM

## 2022-12-30 RX ORDER — ASPIRIN 81 MG/1
81 TABLET ORAL DAILY
Status: DISCONTINUED | OUTPATIENT
Start: 2022-12-30 | End: 2023-01-06 | Stop reason: HOSPADM

## 2022-12-30 RX ORDER — IBUPROFEN 200 MG
24 TABLET ORAL
Status: DISCONTINUED | OUTPATIENT
Start: 2022-12-30 | End: 2023-01-06 | Stop reason: HOSPADM

## 2022-12-30 RX ORDER — HYDROCODONE BITARTRATE AND ACETAMINOPHEN 5; 325 MG/1; MG/1
1 TABLET ORAL EVERY 4 HOURS PRN
Status: DISCONTINUED | OUTPATIENT
Start: 2022-12-30 | End: 2023-01-04

## 2022-12-30 RX ORDER — ACETAMINOPHEN 325 MG/1
650 TABLET ORAL EVERY 6 HOURS PRN
Status: DISCONTINUED | OUTPATIENT
Start: 2022-12-30 | End: 2023-01-06 | Stop reason: HOSPADM

## 2022-12-30 RX ADMIN — HYDROCODONE BITARTRATE AND ACETAMINOPHEN 1 TABLET: 5; 325 TABLET ORAL at 04:12

## 2022-12-30 RX ADMIN — METFORMIN HYDROCHLORIDE 500 MG: 500 TABLET, FILM COATED ORAL at 04:12

## 2022-12-30 RX ADMIN — HYDROCODONE BITARTRATE AND ACETAMINOPHEN 1 TABLET: 5; 325 TABLET ORAL at 08:12

## 2022-12-30 RX ADMIN — ATORVASTATIN CALCIUM 10 MG: 10 TABLET, FILM COATED ORAL at 08:12

## 2022-12-30 RX ADMIN — INSULIN ASPART 1 UNITS: 100 INJECTION, SOLUTION INTRAVENOUS; SUBCUTANEOUS at 09:12

## 2022-12-30 RX ADMIN — CEPHALEXIN 500 MG: 250 CAPSULE ORAL at 08:12

## 2022-12-30 RX ADMIN — CEPHALEXIN 500 MG: 250 CAPSULE ORAL at 04:12

## 2022-12-30 RX ADMIN — APIXABAN 5 MG: 2.5 TABLET, FILM COATED ORAL at 08:12

## 2022-12-30 RX ADMIN — SENNOSIDES AND DOCUSATE SODIUM 1 TABLET: 50; 8.6 TABLET ORAL at 08:12

## 2022-12-30 RX ADMIN — GABAPENTIN 100 MG: 100 CAPSULE ORAL at 09:12

## 2022-12-30 RX ADMIN — METOPROLOL TARTRATE 50 MG: 50 TABLET, FILM COATED ORAL at 08:12

## 2022-12-30 NOTE — PLAN OF CARE
Description: Grooming Status:   Short Term Goal: Pt will perform grooming with s/u sitting EOB.   Long Term Goal: Pt will perform grooming/oral hygiene standing at sink with mod I      LE dressing Status:   Short Term Goal: Pt will perform LE dressing with s/u.   Long Term Goal: Pt will perform LE dressing with mod I.    Toileting Status:   Short Term Goal: Pt will perform toilet hygiene on BSC with s/u.  Long Term Goal: Pt will perform toilet hygiene on toilet with no AE with Mod I.    Commode Transfer:   Short Term Goal: Pt will perform BSC t/f with s/u.  Long Term Goal:  Pt will perform toilet t/f in bathroom with Mod I.     Bathing Status:   Long Term Goal: Pt will perform sponge bath with s/u with no unsafe fatigue.     Strength Status:   Long Term Goal: Pt to perform BUE strengthening with weights and/or body weight to increase ADL independence and safety    Endurance Status:   Short Term Goal:pt to perform 15 min OT treatment with 5 or greater rest breaks  Long Term Goal: pt to perform 30 min OT treat with 3 or less rest breaks

## 2022-12-30 NOTE — PT/OT/SLP EVAL
Occupational Therapy   Evaluation    Name: Merline Ibarra  MRN: [id number]  Admitting Diagnosis: <principal problem not specified>  Recent Surgery: * No surgery found *      Recommendations:     Discharge Recommendations: home health PT  Discharge Equipment Recommendations:  walker, rolling  Barriers to discharge:       Assessment:     Merline Ibarra is a 42 y.o. male with a medical diagnosis of <principal problem not specified>.  Performance deficits affecting function: weakness, impaired endurance, impaired self care skills, impaired functional mobility, impaired balance, decreased lower extremity function, decreased safety awareness, pain.      Rehab Prognosis: Good; patient would benefit from acute skilled OT services to address these deficits and reach maximum level of function.       Plan:     Patient to be seen 5 x/week to address the above listed problems via therapeutic exercises, therapeutic activities, self-care/home management  Plan of Care Expires:    Plan of Care Reviewed with: patient    Subjective     Chief Complaint: Pain and decreased mobility  Patient/Family Comments/goals: Improve mobility    Occupational Profile:  Living Environment: lives with girlfriend  Previous level of function: Independent  Roles and Routines: self-care and home management  Equipment Used at Home: none  Assistance upon Discharge: girlfriend and mother    Pain/Comfort:  Pain Rating 1: 8/10    Patients cultural, spiritual, Amish conflicts given the current situation: no    Objective:     Communicated with: RN prior to session.  Patient found ambulatory in room/[place] with   upon OT entry to room.    General Precautions: Standard,    Orthopedic Precautions: RLE partial weight bearing  Braces:    Respiratory Status: Room air    Occupational Performance:    Bed Mobility:      Pt was already up in chair      Functional Mobility/Transfers:  Patient completed Sit <> Stand Transfer with minimum assistance  with  rolling  walker   Functional Mobility: Min a with RW    Activities of Daily Living:  Upper Body Dressing: supervision shirt    Cognitive/Visual Perceptual:  Cognitive/Psychosocial Skills:     -       Oriented to: Person, Place, Time, and Situation     Physical Exam:  Upper Extremity Range of Motion:     -       Right Upper Extremity: WFL  -       Left Upper Extremity: WFL  Upper Extremity Strength:    -       Right Upper Extremity: WFL  -       Left Upper Extremity: WFL    AMPAC 6 Click ADL:  AMPAC Total Score: 21    Treatment & Education:  Pt educated on OT role/POC.   Importance of OOB activity with staff assistance.  Importance of sitting up in the chair throughout the day as tolerated, especially for meals   Safety during functional t/f and mobility  Importance of assisting with self-care activities       Patient left sitting edge of bed with call button in reach    GOALS:     History:     Past Medical History:   Diagnosis Date    A-fib     CHF (congestive heart failure)     Diabetes mellitus     Hypertension          Past Surgical History:   Procedure Laterality Date    CARDIOVERSION         Time Tracking:     OT Date of Treatment: 12/30/22  OT Start Time: 1321  OT Stop Time: 1329  OT Total Time (min): 8 min    Billable Minutes:Evaluation 8 min  CLARA Cordero/HONEY      12/30/2022

## 2022-12-30 NOTE — PLAN OF CARE
Problem: Adult Inpatient Plan of Care  Goal: Plan of Care Review  Outcome: Ongoing, Progressing  Goal: Patient-Specific Goal (Individualized)  Outcome: Ongoing, Progressing     Problem: Fall Injury Risk  Goal: Absence of Fall and Fall-Related Injury  Outcome: Ongoing, Progressing  Intervention: Identify and Manage Contributors  Flowsheets (Taken 12/30/2022 1725)  Self-Care Promotion:   independence encouraged   BADL personal objects within reach   BADL personal routines maintained  Medication Review/Management:   medications reviewed   high-risk medications identified  Intervention: Promote Injury-Free Environment  Flowsheets (Taken 12/30/2022 1725)  Safety Promotion/Fall Prevention:   assistive device/personal item within reach   diversional activities provided   high risk medications identified   in recliner, wheels locked   medications reviewed   muscle strengthening facilitated   nonskid shoes/socks when out of bed   side rails raised x 3   instructed to call staff for mobility

## 2022-12-30 NOTE — PT/OT/SLP EVAL
Physical Therapy Evaluation    Patient Name:  Merline Ibarra   MRN:  [id number]    Recommendations:     Discharge Recommendations: home health PT   Discharge Equipment Recommendations: walker, rolling   Barriers to discharge: None    Assessment:     Merline Ibarra is a 42 y.o. male admitted with a medical diagnosis of <principal problem not specified>.  He presents with the following impairments/functional limitations: weakness, impaired endurance, impaired functional mobility, gait instability, impaired balance, pain. Patient's impairments have resulted in decrease safety and idependence with functional mobility and ADLs resulting in decreased ability to return home at this time.     Rehab Prognosis: Good; patient would benefit from acute skilled PT services to address these deficits and reach maximum level of function.    Recent Surgery: * No surgery found *      Plan:     During this hospitalization, patient to be seen 5 x/week to address the identified rehab impairments via gait training, therapeutic activities, therapeutic exercises and progress toward the following goals:    Plan of Care Expires:  01/20/23    Subjective     Chief Complaint: pain   Patient/Family Comments/goals: improve physical endurance and mobility for safe return home  Pain/Comfort:  Pain Rating 1: 8/10  Location - Side 1: Right  Location 1: leg    Patients cultural, spiritual, Baptism conflicts given the current situation: no    Living Environment:  Patient lives with his girlfriend. They have no stairs to enter their home.   Prior to admission, patients level of function was independent .  Equipment used at home: none.  DME owned (not currently used): none.  Upon discharge, patient will have assistance from girlfriend.    Objective:     Communicated with nursing staff prior to session.  Patient found supine with    upon PT entry to room.    General Precautions: Standard,    Orthopedic Precautions:RLE partial weight bearing (WB  through heel only)   Braces:    Respiratory Status: Room air    Exams:  RLE Strength: WFL  LLE Strength: WFL    Functional Mobility:  Bed Mobility:     Supine to Sit: stand by assistance  Sit to Supine: stand by assistance  Transfers:     Sit to Stand:  contact guard assistance with rolling walker  Gait: 50 feet with RW on level surface with min A   Balance: Fair       AM-PAC 6 CLICK MOBILITY  Total Score:20     Patient left HOB elevated with call button in reach.    GOALS:     History:     Past Medical History:   Diagnosis Date    A-fib     CHF (congestive heart failure)     Diabetes mellitus     Hypertension        Past Surgical History:   Procedure Laterality Date    CARDIOVERSION         Time Tracking:     PT Received On: 12/30/22  PT Start Time: 1240     PT Stop Time: 1248  PT Total Time (min): 8 min     Billable Minutes: Evaluation 8 minutes     Burke Dean, PT, DPT     12/30/2022

## 2022-12-31 PROBLEM — E87.6 HYPOKALEMIA: Status: ACTIVE | Noted: 2022-12-31

## 2022-12-31 PROBLEM — M62.81 MUSCLE WEAKNESS: Status: ACTIVE | Noted: 2022-12-31

## 2022-12-31 LAB
EST. AVERAGE GLUCOSE BLD GHB EST-MCNC: 277 MG/DL
GLUCOSE SERPL-MCNC: 158 MG/DL (ref 70–105)
GLUCOSE SERPL-MCNC: 183 MG/DL (ref 70–105)
GLUCOSE SERPL-MCNC: 187 MG/DL (ref 70–105)
GLUCOSE SERPL-MCNC: 196 MG/DL (ref 70–105)
HBA1C MFR BLD HPLC: 10.9 % (ref 4.5–6.6)

## 2022-12-31 PROCEDURE — 25000003 PHARM REV CODE 250: Performed by: SPECIALIST

## 2022-12-31 PROCEDURE — 82962 GLUCOSE BLOOD TEST: CPT

## 2022-12-31 PROCEDURE — 11000004 HC SNF PRIVATE

## 2022-12-31 PROCEDURE — 99304 1ST NF CARE SF/LOW MDM 25: CPT | Mod: ,,, | Performed by: SPECIALIST

## 2022-12-31 PROCEDURE — 99304 PR NURSING FACILITY CARE, INIT, LOW SEVERITY: ICD-10-PCS | Mod: ,,, | Performed by: SPECIALIST

## 2022-12-31 PROCEDURE — 94761 N-INVAS EAR/PLS OXIMETRY MLT: CPT

## 2022-12-31 RX ORDER — POTASSIUM CHLORIDE 20 MEQ/1
40 TABLET, EXTENDED RELEASE ORAL
Status: COMPLETED | OUTPATIENT
Start: 2022-12-31 | End: 2022-12-31

## 2022-12-31 RX ADMIN — APIXABAN 5 MG: 2.5 TABLET, FILM COATED ORAL at 08:12

## 2022-12-31 RX ADMIN — METOPROLOL TARTRATE 50 MG: 50 TABLET, FILM COATED ORAL at 08:12

## 2022-12-31 RX ADMIN — CEPHALEXIN 500 MG: 250 CAPSULE ORAL at 08:12

## 2022-12-31 RX ADMIN — GABAPENTIN 100 MG: 100 CAPSULE ORAL at 02:12

## 2022-12-31 RX ADMIN — PANTOPRAZOLE SODIUM 40 MG: 40 TABLET, DELAYED RELEASE ORAL at 08:12

## 2022-12-31 RX ADMIN — METFORMIN HYDROCHLORIDE 500 MG: 500 TABLET, FILM COATED ORAL at 04:12

## 2022-12-31 RX ADMIN — METFORMIN HYDROCHLORIDE 500 MG: 500 TABLET, FILM COATED ORAL at 08:12

## 2022-12-31 RX ADMIN — HYDROCODONE BITARTRATE AND ACETAMINOPHEN 1 TABLET: 5; 325 TABLET ORAL at 02:12

## 2022-12-31 RX ADMIN — CEPHALEXIN 500 MG: 250 CAPSULE ORAL at 04:12

## 2022-12-31 RX ADMIN — ASPIRIN 81 MG: 81 TABLET, COATED ORAL at 08:12

## 2022-12-31 RX ADMIN — GABAPENTIN 100 MG: 100 CAPSULE ORAL at 08:12

## 2022-12-31 RX ADMIN — HYDROCODONE BITARTRATE AND ACETAMINOPHEN 1 TABLET: 5; 325 TABLET ORAL at 06:12

## 2022-12-31 RX ADMIN — ATORVASTATIN CALCIUM 10 MG: 10 TABLET, FILM COATED ORAL at 08:12

## 2022-12-31 RX ADMIN — SENNOSIDES AND DOCUSATE SODIUM 1 TABLET: 50; 8.6 TABLET ORAL at 08:12

## 2022-12-31 RX ADMIN — HYDROCODONE BITARTRATE AND ACETAMINOPHEN 1 TABLET: 5; 325 TABLET ORAL at 08:12

## 2022-12-31 RX ADMIN — CEPHALEXIN 500 MG: 250 CAPSULE ORAL at 12:12

## 2022-12-31 RX ADMIN — POTASSIUM CHLORIDE 40 MEQ: 1500 TABLET, EXTENDED RELEASE ORAL at 03:12

## 2022-12-31 NOTE — NURSING
Patient is upset because he has a diabetic diet ordered, saying he did not have that at [facility]. Patient's girlfriend has brought in bags of chips, snacks and a bag from [facility], she says all he wanted was a hamburger. Encouraged patient and significant other that he needed a diet low in sugars and carbs, and high in protein. Verbalized understanding from the significant other saying she was going to buy him some healthier snacks, printed education also provided.

## 2022-12-31 NOTE — H&P
[facility] Medicine  History & Physical    Patient Name: Merline Ibarra  MRN: [id number]  Patient Class: IP- Swing  Admission Date: 12/30/2022  Attending Physician: Angelica Archuleta MD  Primary Care Provider: Jackie Long DNP, FNP-C         Patient information was obtained from patient, past medical records and ER records.     Subjective:     Principal Problem: muscle weakness    Chief Complaint: muscle weakness     HPI: Patient is a 41 yo AA male with a pmhx of diastolic CHF, CAD, HTN, Afib, and DM.  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  Patient accepted to swing bed from [facility] and follow up appointment with wound care Dr. Ruiz at [facility].  Patient with ECHO with EF 55%, mild TR, PA pressure of 26 mmHg.  On metoprolol post Diltiazem infusion for Afib with RVR.  Patient is now stable on oral med.        Past Medical History:   Diagnosis Date    A-fib     CHF (congestive heart failure)     Diabetes mellitus     Hypertension        Past Surgical History:   Procedure Laterality Date    CARDIOVERSION         Review of patient's allergies indicates:   Allergen Reactions    Lisinopril Swelling       No current facility-administered medications on file prior to encounter.     Current Outpatient Medications on File Prior to Encounter   Medication Sig    aspirin (ECOTRIN) 81 MG EC tablet Take 81 mg by mouth once daily.    ELIQUIS 5 mg Tab Take 1 tablet (5 mg total) by mouth 2 (two) times daily.    gabapentin (NEURONTIN) 100 MG capsule Take 100 mg by mouth 3 (three) times daily.    liraglutide 0.6 mg/0.1 mL, 18 mg/3 mL, subq PNIJ (VICTOZA 3-SOFI) 0.6 mg/0.1 mL (18 mg/3 mL) PnIj pen Inject 0.6 mg into the skin once daily.    metoprolol tartrate (LOPRESSOR) 25 MG tablet Take 1 tablet (25 mg total) by mouth 2 (two) times daily. (Patient taking  differently: Take 50 mg by mouth 2 (two) times daily.)    digoxin (LANOXIN) 125 mcg tablet Take 2 tablets (250 mcg total) by mouth once daily. Recheck with cardiologist for continuation of this medication for 14 days    metFORMIN (GLUCOPHAGE) 500 MG tablet Take 1 tablet (500 mg total) by mouth 2 (two) times daily with meals.    [DISCONTINUED] insulin NPH-insulin regular, 70/30, (NOVOLIN 70/30) 100 unit/mL (70-30) injection Inject into the skin 2 (two) times daily. 20 units in morning, 10 units at night     Family History       Problem Relation (Age of Onset)    Diabetes Mother, Father    Heart disease Mother, Father          Tobacco Use    Smoking status: Former    Smokeless tobacco: Never   Substance and Sexual Activity    Alcohol use: Yes     Comment: OCCASIONALLY     Drug use: Yes     Types: Marijuana    Sexual activity: Yes     Partners: Female     Review of Systems   Unable to perform ROS: Other   All other systems reviewed and are negative.  Objective:     Vital Signs (Most Recent):  Temp: 98.2 °F (36.8 °C) (12/30/22 1950)  Pulse: 95 (12/30/22 1950)  Resp: 18 (12/30/22 2021)  BP: 113/70 (12/30/22 1950)  SpO2: 100 % (12/30/22 1950) Vital Signs (24h Range):  Temp:  [97.9 °F (36.6 °C)-98.2 °F (36.8 °C)] 98.2 °F (36.8 °C)  Pulse:  [95-97] 95  Resp:  [18-19] 18  SpO2:  [100 %] 100 %  BP: (113-123)/(70-75) 113/70     Weight: 94.3 kg (207 lb 14.3 oz)  Body mass index is 28.2 kg/m².    Physical Exam  Vitals and nursing note reviewed. Exam conducted with a chaperone present.   Constitutional:       Appearance: Normal appearance. He is normal weight.   HENT:      Head: Normocephalic and atraumatic.      Nose: Nose normal.   Eyes:      Pupils: Pupils are equal, round, and reactive to light.   Cardiovascular:      Rate and Rhythm: Rhythm irregular.   Pulmonary:      Effort: Pulmonary effort is normal.   Abdominal:      Palpations: Abdomen is soft.   Musculoskeletal:         General: Normal range of motion.       Cervical back: Normal range of motion.      Comments: Patient with freshly wrapped wound right foot   Skin:     General: Skin is warm.   Neurological:      General: No focal deficit present.      Mental Status: He is alert. Mental status is at baseline.   Psychiatric:         Mood and Affect: Mood normal.         Behavior: Behavior normal.         CRANIAL NERVES     CN III, IV, VI   Pupils are equal, round, and reactive to light.     Significant Labs: All pertinent labs within the past 24 hours have been reviewed.    Significant Imaging: I have reviewed all pertinent imaging results/findings within the past 24 hours.    Assessment/Plan:     Muscle weakness    PT/OT consult      VTE Risk Mitigation (From admission, onward)         Ordered     apixaban tablet 5 mg  2 times daily         12/30/22 1329     IP VTE LOW RISK PATIENT  Once         12/30/22 1308     Place PRASANTH hose  Until discontinued         12/30/22 1308                   Angelica Archuleta MD  Department of Hospital Medicine   [facility]

## 2022-12-31 NOTE — NURSING
Dr. Archuleta aware of patient admission. The ER has been very busy, and she says that she will see him later tonight.

## 2022-12-31 NOTE — HPI
Patient is a 41 yo AA male with a pmhx of diastolic CHF, CAD, HTN, Afib, and DM.  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  Patient accepted to swing bed from [facility] and follow up appointment with wound care Dr. Ruiz at [facility].  Patient with ECHO with EF 55%, mild TR, PA pressure of 26 mmHg.  On metoprolol post Diltiazem infusion for Afib with RVR.  Patient is now stable on oral med.

## 2022-12-31 NOTE — NURSING
7P shift and charge nurse are aware that Dr. Archuleta needs to see patient tonight and that he needs Neurontin and a SSI ordered.

## 2022-12-31 NOTE — SUBJECTIVE & OBJECTIVE
Past Medical History:   Diagnosis Date    A-fib     CHF (congestive heart failure)     Diabetes mellitus     Hypertension        Past Surgical History:   Procedure Laterality Date    CARDIOVERSION         Review of patient's allergies indicates:   Allergen Reactions    Lisinopril Swelling       No current facility-administered medications on file prior to encounter.     Current Outpatient Medications on File Prior to Encounter   Medication Sig    aspirin (ECOTRIN) 81 MG EC tablet Take 81 mg by mouth once daily.    ELIQUIS 5 mg Tab Take 1 tablet (5 mg total) by mouth 2 (two) times daily.    gabapentin (NEURONTIN) 100 MG capsule Take 100 mg by mouth 3 (three) times daily.    liraglutide 0.6 mg/0.1 mL, 18 mg/3 mL, subq PNIJ (VICTOZA 3-SOFI) 0.6 mg/0.1 mL (18 mg/3 mL) PnIj pen Inject 0.6 mg into the skin once daily.    metoprolol tartrate (LOPRESSOR) 25 MG tablet Take 1 tablet (25 mg total) by mouth 2 (two) times daily. (Patient taking differently: Take 50 mg by mouth 2 (two) times daily.)    digoxin (LANOXIN) 125 mcg tablet Take 2 tablets (250 mcg total) by mouth once daily. Recheck with cardiologist for continuation of this medication for 14 days    metFORMIN (GLUCOPHAGE) 500 MG tablet Take 1 tablet (500 mg total) by mouth 2 (two) times daily with meals.    [DISCONTINUED] insulin NPH-insulin regular, 70/30, (NOVOLIN 70/30) 100 unit/mL (70-30) injection Inject into the skin 2 (two) times daily. 20 units in morning, 10 units at night     Family History       Problem Relation (Age of Onset)    Diabetes Mother, Father    Heart disease Mother, Father          Tobacco Use    Smoking status: Former    Smokeless tobacco: Never   Substance and Sexual Activity    Alcohol use: Yes     Comment: OCCASIONALLY     Drug use: Yes     Types: Marijuana    Sexual activity: Yes     Partners: Female     Review of Systems   Unable to perform ROS: Other   All other systems reviewed and are negative.  Objective:     Vital Signs (Most  Recent):  Temp: 98.2 °F (36.8 °C) (12/30/22 1950)  Pulse: 95 (12/30/22 1950)  Resp: 18 (12/30/22 2021)  BP: 113/70 (12/30/22 1950)  SpO2: 100 % (12/30/22 1950) Vital Signs (24h Range):  Temp:  [97.9 °F (36.6 °C)-98.2 °F (36.8 °C)] 98.2 °F (36.8 °C)  Pulse:  [95-97] 95  Resp:  [18-19] 18  SpO2:  [100 %] 100 %  BP: (113-123)/(70-75) 113/70     Weight: 94.3 kg (207 lb 14.3 oz)  Body mass index is 28.2 kg/m².    Physical Exam  Vitals and nursing note reviewed. Exam conducted with a chaperone present.   Constitutional:       Appearance: Normal appearance. He is normal weight.   HENT:      Head: Normocephalic and atraumatic.      Nose: Nose normal.   Eyes:      Pupils: Pupils are equal, round, and reactive to light.   Cardiovascular:      Rate and Rhythm: Rhythm irregular.   Pulmonary:      Effort: Pulmonary effort is normal.   Abdominal:      Palpations: Abdomen is soft.   Musculoskeletal:         General: Normal range of motion.      Cervical back: Normal range of motion.      Comments: Patient with freshly wrapped wound right foot   Skin:     General: Skin is warm.   Neurological:      General: No focal deficit present.      Mental Status: He is alert. Mental status is at baseline.   Psychiatric:         Mood and Affect: Mood normal.         Behavior: Behavior normal.         CRANIAL NERVES     CN III, IV, VI   Pupils are equal, round, and reactive to light.     Significant Labs: All pertinent labs within the past 24 hours have been reviewed.    Significant Imaging: I have reviewed all pertinent imaging results/findings within the past 24 hours.

## 2023-01-01 LAB
GLUCOSE SERPL-MCNC: 151 MG/DL (ref 70–105)
GLUCOSE SERPL-MCNC: 204 MG/DL (ref 70–105)
GLUCOSE SERPL-MCNC: 209 MG/DL (ref 70–105)
GLUCOSE SERPL-MCNC: 240 MG/DL (ref 70–105)

## 2023-01-01 PROCEDURE — 94761 N-INVAS EAR/PLS OXIMETRY MLT: CPT

## 2023-01-01 PROCEDURE — 25000003 PHARM REV CODE 250: Performed by: SPECIALIST

## 2023-01-01 PROCEDURE — 97110 THERAPEUTIC EXERCISES: CPT

## 2023-01-01 PROCEDURE — 97116 GAIT TRAINING THERAPY: CPT

## 2023-01-01 PROCEDURE — 63600175 PHARM REV CODE 636 W HCPCS: Performed by: SPECIALIST

## 2023-01-01 PROCEDURE — 11000004 HC SNF PRIVATE

## 2023-01-01 PROCEDURE — 82962 GLUCOSE BLOOD TEST: CPT

## 2023-01-01 RX ADMIN — METOPROLOL TARTRATE 50 MG: 50 TABLET, FILM COATED ORAL at 08:01

## 2023-01-01 RX ADMIN — INSULIN ASPART 2 UNITS: 100 INJECTION, SOLUTION INTRAVENOUS; SUBCUTANEOUS at 05:01

## 2023-01-01 RX ADMIN — CEPHALEXIN 500 MG: 250 CAPSULE ORAL at 08:01

## 2023-01-01 RX ADMIN — GABAPENTIN 100 MG: 100 CAPSULE ORAL at 08:01

## 2023-01-01 RX ADMIN — HYDROCODONE BITARTRATE AND ACETAMINOPHEN 1 TABLET: 5; 325 TABLET ORAL at 08:01

## 2023-01-01 RX ADMIN — PANTOPRAZOLE SODIUM 40 MG: 40 TABLET, DELAYED RELEASE ORAL at 08:01

## 2023-01-01 RX ADMIN — METFORMIN HYDROCHLORIDE 500 MG: 500 TABLET, FILM COATED ORAL at 04:01

## 2023-01-01 RX ADMIN — HYDROCODONE BITARTRATE AND ACETAMINOPHEN 1 TABLET: 5; 325 TABLET ORAL at 04:01

## 2023-01-01 RX ADMIN — INSULIN ASPART 2 UNITS: 100 INJECTION, SOLUTION INTRAVENOUS; SUBCUTANEOUS at 04:01

## 2023-01-01 RX ADMIN — HYDROCODONE BITARTRATE AND ACETAMINOPHEN 1 TABLET: 5; 325 TABLET ORAL at 05:01

## 2023-01-01 RX ADMIN — SENNOSIDES AND DOCUSATE SODIUM 1 TABLET: 50; 8.6 TABLET ORAL at 08:01

## 2023-01-01 RX ADMIN — ASPIRIN 81 MG: 81 TABLET, COATED ORAL at 08:01

## 2023-01-01 RX ADMIN — APIXABAN 5 MG: 2.5 TABLET, FILM COATED ORAL at 08:01

## 2023-01-01 RX ADMIN — GABAPENTIN 100 MG: 100 CAPSULE ORAL at 03:01

## 2023-01-01 RX ADMIN — ATORVASTATIN CALCIUM 10 MG: 10 TABLET, FILM COATED ORAL at 08:01

## 2023-01-01 RX ADMIN — HYDROCODONE BITARTRATE AND ACETAMINOPHEN 1 TABLET: 5; 325 TABLET ORAL at 11:01

## 2023-01-01 RX ADMIN — CEPHALEXIN 500 MG: 250 CAPSULE ORAL at 12:01

## 2023-01-01 RX ADMIN — CEPHALEXIN 500 MG: 250 CAPSULE ORAL at 04:01

## 2023-01-01 RX ADMIN — INSULIN ASPART 1 UNITS: 100 INJECTION, SOLUTION INTRAVENOUS; SUBCUTANEOUS at 08:01

## 2023-01-01 RX ADMIN — METFORMIN HYDROCHLORIDE 500 MG: 500 TABLET, FILM COATED ORAL at 08:01

## 2023-01-01 NOTE — PLAN OF CARE
Problem: Adult Inpatient Plan of Care  Goal: Plan of Care Review  Outcome: Ongoing, Progressing  Flowsheets (Taken 1/1/2023 2835)  Plan of Care Reviewed With: patient  Goal: Absence of Hospital-Acquired Illness or Injury  Outcome: Ongoing, Progressing  Goal: Optimal Comfort and Wellbeing  Outcome: Ongoing, Progressing  Goal: Readiness for Transition of Care  Outcome: Ongoing, Progressing     Problem: Impaired Wound Healing  Goal: Optimal Wound Healing  Outcome: Ongoing, Progressing     Problem: Fall Injury Risk  Goal: Absence of Fall and Fall-Related Injury  Outcome: Ongoing, Progressing

## 2023-01-01 NOTE — NURSING
Patient resting in bed with call light in reach-c/o rt foot pain-dressing intact to rt lower extremity-warms to touch

## 2023-01-01 NOTE — PT/OT/SLP PROGRESS
"Physical Therapy  Treatment    Merline Ibarra   MRN: [id number]   Admitting Diagnosis: <principal problem not specified>    PT Received On: 01/01/23  PT Start Time: 1040     PT Stop Time: 1110    PT Total Time (min): 30 min       Billable Minutes:  Gait Training 10 and Therapeutic Exercise 20    Treatment Type: Treatment  PT/PTA: PT     PTA Visit Number: 0       General Precautions: Standard, fall  Orthopedic Precautions: RLE partial weight bearing (weight bearing through heel only)  Braces:    Respiratory Status: Room air    Spiritual, Cultural Beliefs, Adventism Practices, Values that Affect Care: no    Subjective:  Patient found supine in bed. He reports 6/10 R foot pain. He is agreeable to PT treatment.     Pain/Comfort  Pain Rating 1: 6/10  Location - Side 1: Right  Location - Orientation 1: generalized  Location 1: foot    Objective:        Functional Mobility:  Bed Mobility: supine to sit: SBA        Transfers: sit to stand: CGA        Gait: 2 x 150 feet with RW on level surface with min A. PT provided cueing to push RW and not pick it up as well as to stay closer to RW for increased safety with ambulation.        Balance:   Static Sit: NORMAL: No deviations seen in posture held statically  Dynamic Sit: NORMAL: No deviations seen in posture held dynamically  Static Stand: GOOD-: Takes MODERATE challenges from all directions inconsistently  Dynamic stand: FAIR+: Needs CLOSE SUPERVISION during gait and is able to right self with minor LOB       Treatment and Education:    Ther-Ex  Reps        NuStep  6 mins    LAQs  2 x 10 3" 2# on LLE    Seated marching  2 x 10 2# on RLE    Hip ABD  30 x green TB    HS curls  20 x green TB                                    AM-PAC 6 CLICK MOBILITY  How much help from another person does this patient currently need?   1 = Unable, Total/Dependent Assistance  2 = A lot, Maximum/Moderate Assistance  3 = A little, Minimum/Contact Guard/Supervision  4 = None, Modified " [place]/Independent    Turning over in bed (including adjusting bedclothes, sheets and blankets)?: 4  Sitting down on and standing up from a chair with arms (e.g., wheelchair, bedside commode, etc.): 4  Moving from lying on back to sitting on the side of the bed?: 4  Need to walk in hospital room?: 3  Climbing 3-5 steps with a railing?: 2    AM-PAC Raw Score CMS G-Code Modifier Level of Impairment Assistance   6 % Total / Unable   7 - 9 CM 80 - 100% Maximal Assist   10 - 14 CL 60 - 80% Moderate Assist   15 - 19 CK 40 - 60% Moderate Assist   20 - 22 CJ 20 - 40% Minimal Assist   23 CI 1-20% SBA / CGA   24 CH 0% Independent/ Mod I     Patient left HOB elevated with call button in reach.    Assessment:  Merline Ibarra is a 42 y.o. male with a medical diagnosis of <principal problem not specified> and presents with weakness, decreased balance, and decreased endurance. PT provided patient with visual and verbal cueing throughout treatment session for proper form and decreased compensations with LE Exercises. PT did not apply a weight to R ankle due to patient's surgical incision. PT also provided cueing for proper use of AD during gait training to increase independence and safety. Patient had no reports of increased pain or adverse effects to treatment.     Rehab identified problem list/impairments: weakness, impaired endurance, impaired functional mobility, gait instability, impaired balance, pain    Rehab potential is good.    Activity tolerance: Good    Discharge recommendations: home health PT      Barriers to discharge:      Equipment recommendations: walker, rolling     GOALS:   Multidisciplinary Problems       Physical Therapy Goals       Not on file                    PLAN:    Patient to be seen 5 x/week to address the above listed problems via gait training, therapeutic exercises, therapeutic activities  Plan of Care expires: 01/20/23  Plan of Care reviewed with: patient     Burke Dean, PT,  DPT       01/01/2023

## 2023-01-02 LAB
GLUCOSE SERPL-MCNC: 145 MG/DL (ref 70–105)
GLUCOSE SERPL-MCNC: 175 MG/DL (ref 70–105)
GLUCOSE SERPL-MCNC: 187 MG/DL (ref 70–105)
GLUCOSE SERPL-MCNC: 206 MG/DL (ref 70–105)

## 2023-01-02 PROCEDURE — 94761 N-INVAS EAR/PLS OXIMETRY MLT: CPT

## 2023-01-02 PROCEDURE — 82962 GLUCOSE BLOOD TEST: CPT

## 2023-01-02 PROCEDURE — 25000003 PHARM REV CODE 250: Performed by: SPECIALIST

## 2023-01-02 PROCEDURE — 11000004 HC SNF PRIVATE

## 2023-01-02 PROCEDURE — 63600175 PHARM REV CODE 636 W HCPCS: Performed by: SPECIALIST

## 2023-01-02 RX ADMIN — METOPROLOL TARTRATE 50 MG: 50 TABLET, FILM COATED ORAL at 08:01

## 2023-01-02 RX ADMIN — SENNOSIDES AND DOCUSATE SODIUM 1 TABLET: 50; 8.6 TABLET ORAL at 08:01

## 2023-01-02 RX ADMIN — ATORVASTATIN CALCIUM 10 MG: 10 TABLET, FILM COATED ORAL at 08:01

## 2023-01-02 RX ADMIN — GABAPENTIN 100 MG: 100 CAPSULE ORAL at 02:01

## 2023-01-02 RX ADMIN — HYDROCODONE BITARTRATE AND ACETAMINOPHEN 1 TABLET: 5; 325 TABLET ORAL at 06:01

## 2023-01-02 RX ADMIN — CEPHALEXIN 500 MG: 250 CAPSULE ORAL at 08:01

## 2023-01-02 RX ADMIN — HYDROCODONE BITARTRATE AND ACETAMINOPHEN 1 TABLET: 5; 325 TABLET ORAL at 08:01

## 2023-01-02 RX ADMIN — INSULIN ASPART 1 UNITS: 100 INJECTION, SOLUTION INTRAVENOUS; SUBCUTANEOUS at 08:01

## 2023-01-02 RX ADMIN — GABAPENTIN 100 MG: 100 CAPSULE ORAL at 08:01

## 2023-01-02 RX ADMIN — METFORMIN HYDROCHLORIDE 500 MG: 500 TABLET, FILM COATED ORAL at 04:01

## 2023-01-02 RX ADMIN — METFORMIN HYDROCHLORIDE 500 MG: 500 TABLET, FILM COATED ORAL at 08:01

## 2023-01-02 RX ADMIN — CEPHALEXIN 500 MG: 250 CAPSULE ORAL at 04:01

## 2023-01-02 RX ADMIN — CEPHALEXIN 500 MG: 250 CAPSULE ORAL at 02:01

## 2023-01-02 RX ADMIN — APIXABAN 5 MG: 2.5 TABLET, FILM COATED ORAL at 08:01

## 2023-01-02 RX ADMIN — HYDROCODONE BITARTRATE AND ACETAMINOPHEN 1 TABLET: 5; 325 TABLET ORAL at 02:01

## 2023-01-02 RX ADMIN — PANTOPRAZOLE SODIUM 40 MG: 40 TABLET, DELAYED RELEASE ORAL at 08:01

## 2023-01-02 RX ADMIN — ASPIRIN 81 MG: 81 TABLET, COATED ORAL at 08:01

## 2023-01-02 NOTE — PLAN OF CARE
Problem: Adult Inpatient Plan of Care  Goal: Plan of Care Review  Outcome: Ongoing, Progressing  Goal: Patient-Specific Goal (Individualized)  Outcome: Ongoing, Progressing  Goal: Optimal Comfort and Wellbeing  Outcome: Ongoing, Progressing     Problem: Diabetes Comorbidity  Goal: Blood Glucose Level Within Targeted Range  Outcome: Ongoing, Progressing     Problem: Impaired Wound Healing  Goal: Optimal Wound Healing  Outcome: Ongoing, Progressing

## 2023-01-02 NOTE — NURSING
Old dressing removed from right foot. Moderate amount of serosanguinous drainage noted with no odor present. Some slough and necrotic areas noted to wound bed. Darkened area to right 2nd toe noted, but no drainage present. Wound cleansed with wound cleanser and gauze using aseptic technique and patted dry with gauze. Adaptic applied to wound bed and covered with dry gauze and an ABD pad. Wrapped with rolled gauze and ACE wrap. Wound care tolerated without complaint of pain by patient. Patient left with call bell in reach and nurse to continue to monitor patient.

## 2023-01-02 NOTE — PLAN OF CARE
Problem: Adult Inpatient Plan of Care  Goal: Optimal Comfort and Wellbeing  Outcome: Ongoing, Progressing  Goal: Readiness for Transition of Care  Outcome: Ongoing, Progressing     Problem: Diabetes Comorbidity  Goal: Blood Glucose Level Within Targeted Range  Outcome: Ongoing, Progressing     Problem: Impaired Wound Healing  Goal: Optimal Wound Healing  Outcome: Ongoing, Progressing

## 2023-01-02 NOTE — NURSING
"While checking in the patient, he stated " I'm hungry and she (a family member) is about to go get me something to eat." The nurse inform the patient and the family member on a diabetic diet. The patient and the family member stated that they understood. The family member left the facility and returned to the room with Tre's bag.   "

## 2023-01-03 LAB
GLUCOSE SERPL-MCNC: 183 MG/DL (ref 70–105)
GLUCOSE SERPL-MCNC: 184 MG/DL (ref 70–105)
GLUCOSE SERPL-MCNC: 199 MG/DL (ref 70–105)
GLUCOSE SERPL-MCNC: 209 MG/DL (ref 70–105)

## 2023-01-03 PROCEDURE — 94761 N-INVAS EAR/PLS OXIMETRY MLT: CPT

## 2023-01-03 PROCEDURE — 99308 PR NURSING FAC CARE, SUBSEQ, MINOR COMPLIC: ICD-10-PCS | Mod: ,,, | Performed by: FAMILY MEDICINE

## 2023-01-03 PROCEDURE — 82962 GLUCOSE BLOOD TEST: CPT

## 2023-01-03 PROCEDURE — 63600175 PHARM REV CODE 636 W HCPCS: Performed by: SPECIALIST

## 2023-01-03 PROCEDURE — 11000004 HC SNF PRIVATE

## 2023-01-03 PROCEDURE — 97110 THERAPEUTIC EXERCISES: CPT

## 2023-01-03 PROCEDURE — 25000003 PHARM REV CODE 250: Performed by: SPECIALIST

## 2023-01-03 PROCEDURE — 99308 SBSQ NF CARE LOW MDM 20: CPT | Mod: ,,, | Performed by: FAMILY MEDICINE

## 2023-01-03 PROCEDURE — 97116 GAIT TRAINING THERAPY: CPT

## 2023-01-03 RX ADMIN — CEPHALEXIN 500 MG: 250 CAPSULE ORAL at 08:01

## 2023-01-03 RX ADMIN — GABAPENTIN 100 MG: 100 CAPSULE ORAL at 08:01

## 2023-01-03 RX ADMIN — METOPROLOL TARTRATE 50 MG: 50 TABLET, FILM COATED ORAL at 08:01

## 2023-01-03 RX ADMIN — APIXABAN 5 MG: 2.5 TABLET, FILM COATED ORAL at 08:01

## 2023-01-03 RX ADMIN — CEPHALEXIN 500 MG: 250 CAPSULE ORAL at 05:01

## 2023-01-03 RX ADMIN — METFORMIN HYDROCHLORIDE 500 MG: 500 TABLET, FILM COATED ORAL at 08:01

## 2023-01-03 RX ADMIN — GABAPENTIN 100 MG: 100 CAPSULE ORAL at 03:01

## 2023-01-03 RX ADMIN — INSULIN ASPART 1 UNITS: 100 INJECTION, SOLUTION INTRAVENOUS; SUBCUTANEOUS at 08:01

## 2023-01-03 RX ADMIN — PANTOPRAZOLE SODIUM 40 MG: 40 TABLET, DELAYED RELEASE ORAL at 08:01

## 2023-01-03 RX ADMIN — HYDROCODONE BITARTRATE AND ACETAMINOPHEN 1 TABLET: 5; 325 TABLET ORAL at 06:01

## 2023-01-03 RX ADMIN — HYDROCODONE BITARTRATE AND ACETAMINOPHEN 1 TABLET: 5; 325 TABLET ORAL at 11:01

## 2023-01-03 RX ADMIN — METFORMIN HYDROCHLORIDE 500 MG: 500 TABLET, FILM COATED ORAL at 05:01

## 2023-01-03 RX ADMIN — ATORVASTATIN CALCIUM 10 MG: 10 TABLET, FILM COATED ORAL at 08:01

## 2023-01-03 RX ADMIN — CEPHALEXIN 500 MG: 250 CAPSULE ORAL at 01:01

## 2023-01-03 RX ADMIN — ASPIRIN 81 MG: 81 TABLET, COATED ORAL at 08:01

## 2023-01-03 RX ADMIN — HYDROCODONE BITARTRATE AND ACETAMINOPHEN 1 TABLET: 5; 325 TABLET ORAL at 04:01

## 2023-01-03 NOTE — HOSPITAL COURSE
1/3/23 -pt. Doing well. No new complaints voiced.    1/4/23 - pt. Complaining of pain. Orders revised.    1/6/23 - pt. Is going home today to continue wound care with home health.

## 2023-01-03 NOTE — SUBJECTIVE & OBJECTIVE
Interval History: pt. Doing well. No complaints voiced.    Review of Systems  Objective:     Vital Signs (Most Recent):  Temp: 98 °F (36.7 °C) (01/03/23 0711)  Pulse: 87 (01/03/23 0711)  Resp: 18 (01/03/23 0711)  BP: 110/75 (01/03/23 0711)  SpO2: 100 % (01/03/23 0711) Vital Signs (24h Range):  Temp:  [98 °F (36.7 °C)-98.2 °F (36.8 °C)] 98 °F (36.7 °C)  Pulse:  [] 87  Resp:  [18-20] 18  SpO2:  [98 %-100 %] 100 %  BP: (110-113)/(65-75) 110/75     Weight: 93.9 kg (207 lb 0.2 oz)  Body mass index is 28.08 kg/m².    Intake/Output Summary (Last 24 hours) at 1/3/2023 0823  Last data filed at 1/3/2023 0111  Gross per 24 hour   Intake 240 ml   Output 900 ml   Net -660 ml      Physical Exam    Significant Labs: All pertinent labs within the past 24 hours have been reviewed.    Significant Imaging: I have reviewed all pertinent imaging results/findings within the past 24 hours.

## 2023-01-03 NOTE — PT/OT/SLP PROGRESS
"Occupational Therapy  Treatment    Merline Ibarra   MRN: [id number]   Admitting Diagnosis: <principal problem not specified>    OT Date of Treatment: 01/03/23   OT Start Time: 0826  OT Stop Time: 0853  OT Total Time (min): 27 min    Billable Minutes:  Self Care/Home Management 5 min and Therapeutic Exercise 22 min               General Precautions: Standard, fall  Orthopedic Precautions: RLE partial weight bearing  Braces:    Respiratory Status: Room air         Subjective:  Communicated with RN prior to session.    Pt exhibits aggravation with each exercise OT asks him to complete.    Pain/Comfort  Pain Rating 1: 6/10    Objective:        Functional Mobility:  Bed Mobility:       Transfers:        Functional Ambulation: Mod I    Activities of Daily Living:   Pt completed ambulation to bathroom with RW and Mod I; Toileting Mod I    Therapeutic Activities and Exercises:  Patient completed the following for increased strength to increase I with ADLs: UBE 6 min x 1x, 5# dowel- shoulder press, chest press, bicep curls x 40, blue theraflex x 40 both ways, green theraband- scapular retraction and rows x 40      AM-PAC 6 CLICK ADL   How much help from another person does this patient currently need?   1 = Unable, Total/Dependent Assistance  2 = A lot, Maximum/Moderate Assistance  3 = A little, Minimum/Contact Guard/Supervision  4 = None, Modified [place]/Independent    Putting on and taking off regular lower body clothing? : 3  Bathing (including washing, rinsing, drying)?: 3  Toileting, which includes using toilet, bedpan, or urinal? : 3  Putting on and taking off regular upper body clothing?: 4  Taking care of personal grooming such as brushing teeth?: 4  Eating meals?: 4  Daily Activity Total Score: 21     AM-PAC Raw Score CMS "G-Code Modifier Level of Impairment Assistance   6 % Total / Unable   7 - 8 CM 80 - 100% Maximal Assist   9-13 CL 60 - 80% Moderate Assist   14 - 19 CK 40 - 60% Moderate Assist   20 " - 22 CJ 20 - 40% Minimal Assist   23 CI 1-20% SBA / CGA   24 CH 0% Independent/ Mod I       Patient left up in chair with  PT notified    ASSESSMENT:  Merline Ibarra is a 42 y.o. male with a medical diagnosis of <principal problem not specified> and presents with decreased strength, decreased endurance, decreased self-care.    Rehab identified problem list/impairments:  weakness, impaired endurance, impaired self care skills, impaired functional mobility, impaired balance, decreased lower extremity function, decreased safety awareness    Rehab potential is good.    Activity tolerance: Good    Discharge recommendations: home health PT   Barriers to discharge:      Equipment recommendations: walker, rolling    GOALS:   Multidisciplinary Problems       Occupational Therapy Goals          Problem: Occupational Therapy    Goal Priority Disciplines Outcome Interventions   Occupational Therapy Goal     OT, PT/OT Ongoing, Progressing    Description: Description: Grooming Status:   Short Term Goal: Pt will perform grooming with s/u sitting EOB.   Long Term Goal: Pt will perform grooming/oral hygiene standing at sink with mod I      LE dressing Status:   Short Term Goal: Pt will perform LE dressing with s/u.   Long Term Goal: Pt will perform LE dressing with mod I.    Toileting Status:   Short Term Goal: Pt will perform toilet hygiene on BSC with s/u.  Long Term Goal: Pt will perform toilet hygiene on toilet with no AE with Mod I.    Commode Transfer:   Short Term Goal: Pt will perform BSC t/f with s/u.  Long Term Goal:  Pt will perform toilet t/f in bathroom with Mod I.     Bathing Status:   Long Term Goal: Pt will perform sponge bath with s/u with no unsafe fatigue.     Strength Status:   Long Term Goal: Pt to perform BUE strengthening with weights and/or body weight to increase ADL independence and safety    Endurance Status:   Short Term Goal:pt to perform 15 min OT treatment with 5 or greater rest breaks  Long Term  Goal: pt to perform 30 min OT treat with 3 or less rest breaks                         Plan:  Patient to be seen 5 x/week to address the above listed problems via therapeutic exercises  Plan of Care expires:    Plan of Care reviewed with: patient       Milka Bobby, OTR/L    01/03/2023

## 2023-01-03 NOTE — PROGRESS NOTES
[facility]  Hospital Medicine  Progress Note    Patient Name: Merline Ibarra  MRN: [id number]  Patient Class: IP- Swing   Admission Date: 12/30/2022  Length of Stay: 4 days  Attending Physician: Angelica Archuleta MD  Primary Care Provider: Jackie Long DNP, FNP-C        Subjective:     Principal Problem:<principal problem not specified>        HPI:  Patient is a 41 yo AA male with a pmhx of diastolic CHF, CAD, HTN, Afib, and DM.  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  Patient accepted to swing bed from [facility] and follow up appointment with wound care Dr. Ruiz at [facility].  Patient with ECHO with EF 55%, mild TR, PA pressure of 26 mmHg.  On metoprolol post Diltiazem infusion for Afib with RVR.  Patient is now stable on oral med.        Overview/Hospital Course:  1/3/23 -pt. Doing well. No new complaints voiced.      Interval History: pt. Doing well. No complaints voiced.    Review of Systems  Objective:     Vital Signs (Most Recent):  Temp: 98 °F (36.7 °C) (01/03/23 0711)  Pulse: 87 (01/03/23 0711)  Resp: 18 (01/03/23 0711)  BP: 110/75 (01/03/23 0711)  SpO2: 100 % (01/03/23 0711) Vital Signs (24h Range):  Temp:  [98 °F (36.7 °C)-98.2 °F (36.8 °C)] 98 °F (36.7 °C)  Pulse:  [] 87  Resp:  [18-20] 18  SpO2:  [98 %-100 %] 100 %  BP: (110-113)/(65-75) 110/75     Weight: 93.9 kg (207 lb 0.2 oz)  Body mass index is 28.08 kg/m².    Intake/Output Summary (Last 24 hours) at 1/3/2023 0823  Last data filed at 1/3/2023 0111  Gross per 24 hour   Intake 240 ml   Output 900 ml   Net -660 ml      Physical Exam    Significant Labs: All pertinent labs within the past 24 hours have been reviewed.    Significant Imaging: I have reviewed all pertinent imaging results/findings within the past 24 hours.      Assessment/Plan:      Muscle weakness    PT/OT consult      VTE Risk  Mitigation (From admission, onward)         Ordered     apixaban tablet 5 mg  2 times daily         12/30/22 1329     IP VTE LOW RISK PATIENT  Once         12/30/22 1308     Place PRASANTH hose  Until discontinued         12/30/22 1308                Discharge Planning   ZOIE:      Code Status: Full Code   Is the patient medically ready for discharge?:     Reason for patient still in hospital (select all that apply): Patient trending condition  Discharge Plan A: Home Health                  Jessy Dee MD  Department of Hospital Medicine   [facility]

## 2023-01-03 NOTE — PT/OT/SLP PROGRESS
"Physical Therapy  Treatment    Merline Ibarra   MRN: [id number]   Admitting Diagnosis: <principal problem not specified>    PT Received On: 01/03/23  PT Start Time: 0855     PT Stop Time: 0925    PT Total Time (min): 30 min       Billable Minutes:  Gait Training 8 and Therapeutic Exercise 22    Treatment Type: Treatment  PT/PTA: PT     PTA Visit Number: 0       General Precautions: Standard, fall  Orthopedic Precautions: RLE partial weight bearing (weight bearing through heel only)  Braces:    Respiratory Status: Room air    Spiritual, Cultural Beliefs, Pentecostal Practices, Values that Affect Care: no    Subjective:  Patient received from OT following treatment. He reports 8/10 pain in R foot, but the nurse had already been notified.     Pain/Comfort  Pain Rating 1: 8/10  Location - Side 1: Right  Location - Orientation 1: generalized  Location 1: foot  Pain Addressed 1: Nurse notified    Objective:        Functional Mobility:  Bed Mobility: not completed today        Transfers: 4 x 5 sit<> stand from standard chair to RW with SBA.        Gait: 175 feet with RW on level surface with CGA and minimal verbal and visual cueing for staying close to RW, decreased trunk flexion, and staying within RW during turning for improved safety during ambulation.        Balance:   Static Sit: NORMAL: No deviations seen in posture held statically  Dynamic Sit: NORMAL: No deviations seen in posture held dynamically  Static Stand: GOOD-: Takes MODERATE challenges from all directions inconsistently  Dynamic stand: FAIR+: Needs CLOSE SUPERVISION during gait and is able to right self with minor LOB       Treatment and Education:    Ther-Ex  Reps        NuStep  8 mins    LAQs  3 x 10 3" 2# on B LE    Seated marching  2 x 10 2# on BLE    Hip ABD  30 x green TB    HS curls  20 x green TB                                    AM-PAC 6 CLICK MOBILITY  How much help from another person does this patient currently need?   1 = Unable, " Total/Dependent Assistance  2 = A lot, Maximum/Moderate Assistance  3 = A little, Minimum/Contact Guard/Supervision  4 = None, Modified [place]/Independent    Turning over in bed (including adjusting bedclothes, sheets and blankets)?: 4  Sitting down on and standing up from a chair with arms (e.g., wheelchair, bedside commode, etc.): 4  Moving to and from a bed to a chair (including a wheelchair)?: 3  Need to walk in hospital room?: 3  Climbing 3-5 steps with a railing?: 2    AM-PAC Raw Score CMS G-Code Modifier Level of Impairment Assistance   6 % Total / Unable   7 - 9 CM 80 - 100% Maximal Assist   10 - 14 CL 60 - 80% Moderate Assist   15 - 19 CK 40 - 60% Moderate Assist   20 - 22 CJ 20 - 40% Minimal Assist   23 CI 1-20% SBA / CGA   24 CH 0% Independent/ Mod I     Patient left HOB elevated with call button in reach.    Assessment:  Merline Ibarra is a 42 y.o. male with a medical diagnosis of <principal problem not specified> and presents with weakness, decreased balance, and decreased endurance. PT provided patient with visual and verbal cueing throughout treatment session for proper form and decreased compensations with LE Exercises. Patient tolerated weight on R LE with no reports of increased pain. Patient required occasional rest breaks due to fatigue. No adverse effects noted to treatment.     Rehab identified problem list/impairments: weakness, impaired endurance, impaired functional mobility, gait instability, impaired balance, decreased lower extremity function, pain    Rehab potential is good.    Activity tolerance: Good    Discharge recommendations: home health PT      Barriers to discharge:      Equipment recommendations: walker, rolling     GOALS:   Multidisciplinary Problems       Physical Therapy Goals       Not on file                    PLAN:    Patient to be seen 5 x/week to address the above listed problems via gait training, therapeutic exercises, therapeutic activities  Plan of  Care expires: 02/20/23  Plan of Care reviewed with: patient     Burke Jermaine, PT, DPT       01/03/2023

## 2023-01-03 NOTE — PLAN OF CARE
Problem: Occupational Therapy  Goal: Occupational Therapy Goal  Description: Description: Grooming Status:   Short Term Goal: Pt will perform grooming with s/u sitting EOB.   Long Term Goal: Pt will perform grooming/oral hygiene standing at sink with mod I      LE dressing Status:   Short Term Goal: Pt will perform LE dressing with s/u.   Long Term Goal: Pt will perform LE dressing with mod I.    Toileting Status:   Short Term Goal: Pt will perform toilet hygiene on BSC with s/u.  Long Term Goal: Pt will perform toilet hygiene on toilet with no AE with Mod I.    Commode Transfer:   Short Term Goal: Pt will perform BSC t/f with s/u.  Long Term Goal:  Pt will perform toilet t/f in bathroom with Mod I.     Bathing Status:   Long Term Goal: Pt will perform sponge bath with s/u with no unsafe fatigue.     Strength Status:   Long Term Goal: Pt to perform BUE strengthening with weights and/or body weight to increase ADL independence and safety    Endurance Status:   Short Term Goal:pt to perform 15 min OT treatment with 5 or greater rest breaks  Long Term Goal: pt to perform 30 min OT treat with 3 or less rest breaks    Outcome: Ongoing, Progressing

## 2023-01-03 NOTE — PLAN OF CARE
[facility]  Medical Surgical Unit - Skilled Nursing Facility  Patient: Merline Ibarra     Interdisciplinary Team Meeting     Today's Date: 1/3/2023     Estimated D/C Date: tbd       Physician: Dr. Abraham    Pharmacy: [facility]    : Erica Samaniego Physical/Occupational Therapy: SULTANA Rosales/OSCAR Dean   Speech Therapy: n/a Activity Therapy: watch tv, read newspaper   Nursing: SULTANA Stern, RN, TAYO Wetzel, RN, BEBETO Chiang RN Other: J CARLOS Braga CNA     Nurse  New Symptoms/Problems: none  Last BM: 1/1/2023 Urine: continent Diarrhea: No   Constipated: No Bladder: continent    Isolation: No     O2: n/a  Rm Air: 99 %   Nutrition: see dietary notes  Speech/Swallowing: n/a  Aspiration Precautions: No  Cognition: alert and oriented    Physical Therapy  Physical Therapy/Gait: 175 feet with RW on level surface with CGA and minimal verbal and visual cueing for staying close to RW, decreased trunk flexion, and staying within RW during turning for improved safety during ambulation.                     ELOS: tbd   Transfers: 4 x 5 sit<> stand from standard chair to RW with SBA.     Range of Motion/Restrictions: RLE partial weight bearing (weight bearing through heel only)     Occupational Therapy  Occupational Therapy: following for increased strength to increase I with ADLs Eating/Grooming: independent   Toileting: supervision only Bathing: supervision only   Dressing (Upper Body): independent Dressing (Lower Body): independent       Tx Plan/Recommendations reviewed with patient on 12/30/2022 at bedside.  Additional family Conference/Training: not at this time  D/C Plan/Recommendations: home with home health    Pharmacy  Medication Changes (see MD orders in chart): No  MD/NP: Dr. Dee Labs Reviewed: yes New Lab Orders: no     MD/NP Signature: Time:

## 2023-01-04 PROBLEM — A48.0 GAS GANGRENE OF FOOT: Status: ACTIVE | Noted: 2023-01-04

## 2023-01-04 PROBLEM — E11.65 HYPERGLYCEMIA DUE TO TYPE 2 DIABETES MELLITUS: Status: ACTIVE | Noted: 2023-01-04

## 2023-01-04 PROBLEM — N17.9 ACUTE KIDNEY INJURY: Status: ACTIVE | Noted: 2023-01-04

## 2023-01-04 PROBLEM — E11.8 DIABETIC FOOT: Status: ACTIVE | Noted: 2023-01-04

## 2023-01-04 PROBLEM — R55 SYNCOPAL EPISODES: Status: ACTIVE | Noted: 2023-01-04

## 2023-01-04 PROBLEM — A41.9 SEPSIS: Status: ACTIVE | Noted: 2023-01-04

## 2023-01-04 PROBLEM — R31.0 GROSS HEMATURIA: Status: ACTIVE | Noted: 2023-01-04

## 2023-01-04 PROBLEM — I48.92 ATRIAL FLUTTER WITH RAPID VENTRICULAR RESPONSE: Status: ACTIVE | Noted: 2023-01-04

## 2023-01-04 PROBLEM — R00.2 PALPITATIONS: Status: ACTIVE | Noted: 2023-01-04

## 2023-01-04 PROBLEM — Z72.0 TOBACCO USE: Status: ACTIVE | Noted: 2023-01-04

## 2023-01-04 PROBLEM — N35.919 URETHRAL MEATAL STENOSIS: Status: ACTIVE | Noted: 2023-01-04

## 2023-01-04 PROBLEM — E44.1 MILD PROTEIN MALNUTRITION: Status: ACTIVE | Noted: 2023-01-04

## 2023-01-04 PROBLEM — E11.621 DIABETIC ULCER OF TOE OF RIGHT FOOT ASSOCIATED WITH TYPE 2 DIABETES MELLITUS, WITH BONE INVOLVEMENT WITHOUT EVIDENCE OF NECROSIS: Status: ACTIVE | Noted: 2023-01-04

## 2023-01-04 PROBLEM — L97.516 DIABETIC ULCER OF TOE OF RIGHT FOOT ASSOCIATED WITH TYPE 2 DIABETES MELLITUS, WITH BONE INVOLVEMENT WITHOUT EVIDENCE OF NECROSIS: Status: ACTIVE | Noted: 2023-01-04

## 2023-01-04 PROBLEM — N39.0 URINARY TRACT INFECTION: Status: ACTIVE | Noted: 2023-01-04

## 2023-01-04 PROBLEM — I49.9 ARRHYTHMIA: Status: ACTIVE | Noted: 2023-01-04

## 2023-01-04 PROBLEM — E66.9 OBESITY, UNSPECIFIED: Status: ACTIVE | Noted: 2023-01-04

## 2023-01-04 PROBLEM — F19.90 ILLICIT DRUG USE: Status: ACTIVE | Noted: 2023-01-04

## 2023-01-04 PROBLEM — Z72.0 NICOTINE ABUSE: Status: ACTIVE | Noted: 2023-01-04

## 2023-01-04 PROBLEM — L02.31 GLUTEAL ABSCESS: Status: ACTIVE | Noted: 2023-01-04

## 2023-01-04 LAB
GLUCOSE SERPL-MCNC: 144 MG/DL (ref 70–105)
GLUCOSE SERPL-MCNC: 167 MG/DL (ref 70–105)
GLUCOSE SERPL-MCNC: 172 MG/DL (ref 70–105)
GLUCOSE SERPL-MCNC: 187 MG/DL (ref 70–105)

## 2023-01-04 PROCEDURE — 82962 GLUCOSE BLOOD TEST: CPT

## 2023-01-04 PROCEDURE — 25000003 PHARM REV CODE 250: Performed by: FAMILY MEDICINE

## 2023-01-04 PROCEDURE — 25000003 PHARM REV CODE 250: Performed by: SPECIALIST

## 2023-01-04 PROCEDURE — 99308 SBSQ NF CARE LOW MDM 20: CPT | Mod: ,,, | Performed by: FAMILY MEDICINE

## 2023-01-04 PROCEDURE — 94761 N-INVAS EAR/PLS OXIMETRY MLT: CPT

## 2023-01-04 PROCEDURE — 99308 PR NURSING FAC CARE, SUBSEQ, MINOR COMPLIC: ICD-10-PCS | Mod: ,,, | Performed by: FAMILY MEDICINE

## 2023-01-04 PROCEDURE — 97110 THERAPEUTIC EXERCISES: CPT

## 2023-01-04 PROCEDURE — 11000004 HC SNF PRIVATE

## 2023-01-04 RX ORDER — HYDROCODONE BITARTRATE AND ACETAMINOPHEN 10; 325 MG/1; MG/1
1 TABLET ORAL EVERY 6 HOURS PRN
Status: DISCONTINUED | OUTPATIENT
Start: 2023-01-04 | End: 2023-01-06 | Stop reason: HOSPADM

## 2023-01-04 RX ADMIN — ASPIRIN 81 MG: 81 TABLET, COATED ORAL at 08:01

## 2023-01-04 RX ADMIN — GABAPENTIN 100 MG: 100 CAPSULE ORAL at 08:01

## 2023-01-04 RX ADMIN — CEPHALEXIN 500 MG: 250 CAPSULE ORAL at 01:01

## 2023-01-04 RX ADMIN — HYDROCODONE BITARTRATE AND ACETAMINOPHEN 1 TABLET: 10; 325 TABLET ORAL at 05:01

## 2023-01-04 RX ADMIN — ATORVASTATIN CALCIUM 10 MG: 10 TABLET, FILM COATED ORAL at 08:01

## 2023-01-04 RX ADMIN — METOPROLOL TARTRATE 50 MG: 50 TABLET, FILM COATED ORAL at 08:01

## 2023-01-04 RX ADMIN — HYDROCODONE BITARTRATE AND ACETAMINOPHEN 1 TABLET: 5; 325 TABLET ORAL at 01:01

## 2023-01-04 RX ADMIN — METFORMIN HYDROCHLORIDE 500 MG: 500 TABLET, FILM COATED ORAL at 04:01

## 2023-01-04 RX ADMIN — APIXABAN 5 MG: 2.5 TABLET, FILM COATED ORAL at 08:01

## 2023-01-04 RX ADMIN — CEPHALEXIN 500 MG: 250 CAPSULE ORAL at 08:01

## 2023-01-04 RX ADMIN — METFORMIN HYDROCHLORIDE 500 MG: 500 TABLET, FILM COATED ORAL at 08:01

## 2023-01-04 RX ADMIN — HYDROCODONE BITARTRATE AND ACETAMINOPHEN 1 TABLET: 5; 325 TABLET ORAL at 04:01

## 2023-01-04 RX ADMIN — HYDROCODONE BITARTRATE AND ACETAMINOPHEN 1 TABLET: 10; 325 TABLET ORAL at 11:01

## 2023-01-04 RX ADMIN — GABAPENTIN 100 MG: 100 CAPSULE ORAL at 03:01

## 2023-01-04 RX ADMIN — PANTOPRAZOLE SODIUM 40 MG: 40 TABLET, DELAYED RELEASE ORAL at 08:01

## 2023-01-04 RX ADMIN — CEPHALEXIN 500 MG: 250 CAPSULE ORAL at 04:01

## 2023-01-04 NOTE — PLAN OF CARE
Problem: Adult Inpatient Plan of Care  Goal: Plan of Care Review  Outcome: Ongoing, Progressing  Goal: Patient-Specific Goal (Individualized)  Outcome: Ongoing, Progressing  Goal: Absence of Hospital-Acquired Illness or Injury  Outcome: Ongoing, Progressing  Goal: Optimal Comfort and Wellbeing  Outcome: Ongoing, Progressing  Goal: Readiness for Transition of Care  Outcome: Ongoing, Progressing     Problem: Impaired Wound Healing  Goal: Optimal Wound Healing  Outcome: Ongoing, Progressing     Problem: Glycemic Control Impaired (Sepsis/Septic Shock)  Goal: Blood Glucose Level Within Desired Range  Outcome: Ongoing, Progressing

## 2023-01-04 NOTE — PT/OT/SLP PROGRESS
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      Pain/Comfort  Pain Rating 1: 8/10    Objective:        Functional Mobility:  Bed Mobility:       Transfers:        Functional Ambulation: Mod I    Activities of Daily Living:       Therapeutic Activities and Exercises:  Patient completed the following for increased strength to increase I with ADLs: UBE 6 min x 1x, 5# dowel- shoulder press, chest press, bicep curls x 40, blue theraflex x 40 both ways, green theraband- scapular retraction and rows x 40; chair pushups 3 x 10      AM-PAC 6 CLICK ADL   How much help from another person does this patient currently need?   1 = Unable, Total/Dependent Assistance  2 = A lot, Maximum/Moderate Assistance  3 = A little, Minimum/Contact Guard/Supervision  4 = None, Modified [place]/Independent    Putting on and taking off regular lower body clothing? : 3  Bathing (including washing, rinsing, drying)?: 3  Toileting, which includes using toilet, bedpan, or urinal? : 3  Putting on and taking off regular upper body clothing?: 4  Taking care of personal grooming such as brushing teeth?: 4  Eating meals?: 4  Daily Activity Total Score: 21     AM-PAC Raw Score CMS "G-Code Modifier Level of Impairment Assistance   6 % Total / Unable   7 - 8 CM 80 - 100% Maximal Assist   9-13 CL 60 - 80% Moderate Assist   14 - 19 CK 40 - 60% Moderate Assist   20 - 22 CJ 20 - 40% Minimal Assist   23 CI 1-20% SBA / CGA "   24 CH 0% Independent/ Mod I       Patient left up in chair with call button in reach    ASSESSMENT:  Merline Ibarra is a 42 y.o. male with a medical diagnosis of Diabetic ulcer of toe of right foot associated with type 2 diabetes mellitus, with bone involvement without evidence of necrosis and presents with decreased strength, decreased endurance, decreased self-care.    Rehab identified problem list/impairments:  weakness, impaired endurance, impaired self care skills, impaired functional mobility, impaired balance, decreased lower extremity function, decreased safety awareness    Rehab potential is good.    Activity tolerance: Good    Discharge recommendations: home health PT   Barriers to discharge:      Equipment recommendations: walker, rolling    GOALS:   Multidisciplinary Problems       Occupational Therapy Goals          Problem: Occupational Therapy    Goal Priority Disciplines Outcome Interventions   Occupational Therapy Goal     OT, PT/OT Ongoing, Progressing    Description: Description: Grooming Status:   Short Term Goal: Pt will perform grooming with s/u sitting EOB.   Long Term Goal: Pt will perform grooming/oral hygiene standing at sink with mod I      LE dressing Status:   Short Term Goal: Pt will perform LE dressing with s/u.   Long Term Goal: Pt will perform LE dressing with mod I.    Toileting Status:   Short Term Goal: Pt will perform toilet hygiene on BSC with s/u.  Long Term Goal: Pt will perform toilet hygiene on toilet with no AE with Mod I.    Commode Transfer:   Short Term Goal: Pt will perform BSC t/f with s/u.  Long Term Goal:  Pt will perform toilet t/f in bathroom with Mod I.     Bathing Status:   Long Term Goal: Pt will perform sponge bath with s/u with no unsafe fatigue.     Strength Status:   Long Term Goal: Pt to perform BUE strengthening with weights and/or body weight to increase ADL independence and safety    Endurance Status:   Short Term Goal:pt to perform 15 min OT  treatment with 5 or greater rest breaks  Long Term Goal: pt to perform 30 min OT treat with 3 or less rest breaks                         Plan:  Patient to be seen 5 x/week to address the above listed problems via therapeutic exercises  Plan of Care expires:    Plan of Care reviewed with: patient       Mlika HerringKenzie, OTR/L    01/04/2023

## 2023-01-04 NOTE — SUBJECTIVE & OBJECTIVE
Interval History: complaining of pain. Orders revised.    Review of Systems  Objective:     Vital Signs (Most Recent):  Temp: 98.4 °F (36.9 °C) (01/03/23 2005)  Pulse: 90 (01/03/23 2005)  Resp: 18 (01/04/23 0454)  BP: 99/63 (01/03/23 2005)  SpO2: 100 % (01/03/23 2005) Vital Signs (24h Range):  Temp:  [98.4 °F (36.9 °C)] 98.4 °F (36.9 °C)  Pulse:  [84-90] 90  Resp:  [18-20] 18  SpO2:  [96 %-100 %] 100 %  BP: (99)/(63) 99/63     Weight: 93.9 kg (207 lb 0.2 oz)  Body mass index is 28.08 kg/m².    Intake/Output Summary (Last 24 hours) at 1/4/2023 0821  Last data filed at 1/4/2023 0457  Gross per 24 hour   Intake 480 ml   Output 1400 ml   Net -920 ml      Physical Exam    Significant Labs: All pertinent labs within the past 24 hours have been reviewed.    Significant Imaging: I have reviewed all pertinent imaging results/findings within the past 24 hours.

## 2023-01-04 NOTE — PLAN OF CARE
Problem: Adult Inpatient Plan of Care  Goal: Plan of Care Review  Outcome: Ongoing, Progressing  Goal: Patient-Specific Goal (Individualized)  Outcome: Ongoing, Progressing  Goal: Absence of Hospital-Acquired Illness or Injury  Outcome: Ongoing, Progressing  Goal: Optimal Comfort and Wellbeing  Outcome: Ongoing, Progressing     Problem: Impaired Wound Healing  Goal: Optimal Wound Healing  Outcome: Ongoing, Progressing     Problem: Fall Injury Risk  Goal: Absence of Fall and Fall-Related Injury  Outcome: Ongoing, Progressing

## 2023-01-04 NOTE — PROGRESS NOTES
"Pt refused to participate, pt states "I just got a narco shot and I don't feel like doing anything".  Phu Terrazas, KIET  1/4/2023    "

## 2023-01-05 LAB
GLUCOSE SERPL-MCNC: 137 MG/DL (ref 70–105)
GLUCOSE SERPL-MCNC: 149 MG/DL (ref 70–105)
GLUCOSE SERPL-MCNC: 178 MG/DL (ref 70–105)
GLUCOSE SERPL-MCNC: 228 MG/DL (ref 70–105)

## 2023-01-05 PROCEDURE — 94761 N-INVAS EAR/PLS OXIMETRY MLT: CPT

## 2023-01-05 PROCEDURE — 97530 THERAPEUTIC ACTIVITIES: CPT

## 2023-01-05 PROCEDURE — 11000004 HC SNF PRIVATE

## 2023-01-05 PROCEDURE — 63600175 PHARM REV CODE 636 W HCPCS: Performed by: SPECIALIST

## 2023-01-05 PROCEDURE — 97116 GAIT TRAINING THERAPY: CPT

## 2023-01-05 PROCEDURE — 97110 THERAPEUTIC EXERCISES: CPT

## 2023-01-05 PROCEDURE — 82962 GLUCOSE BLOOD TEST: CPT

## 2023-01-05 PROCEDURE — 25000003 PHARM REV CODE 250: Performed by: FAMILY MEDICINE

## 2023-01-05 PROCEDURE — 25000003 PHARM REV CODE 250: Performed by: SPECIALIST

## 2023-01-05 RX ADMIN — SENNOSIDES AND DOCUSATE SODIUM 1 TABLET: 50; 8.6 TABLET ORAL at 08:01

## 2023-01-05 RX ADMIN — CEPHALEXIN 500 MG: 250 CAPSULE ORAL at 08:01

## 2023-01-05 RX ADMIN — PANTOPRAZOLE SODIUM 40 MG: 40 TABLET, DELAYED RELEASE ORAL at 08:01

## 2023-01-05 RX ADMIN — HYDROCODONE BITARTRATE AND ACETAMINOPHEN 1 TABLET: 10; 325 TABLET ORAL at 09:01

## 2023-01-05 RX ADMIN — APIXABAN 5 MG: 2.5 TABLET, FILM COATED ORAL at 08:01

## 2023-01-05 RX ADMIN — HYDROCODONE BITARTRATE AND ACETAMINOPHEN 1 TABLET: 10; 325 TABLET ORAL at 04:01

## 2023-01-05 RX ADMIN — CEPHALEXIN 500 MG: 250 CAPSULE ORAL at 04:01

## 2023-01-05 RX ADMIN — METOPROLOL TARTRATE 50 MG: 50 TABLET, FILM COATED ORAL at 08:01

## 2023-01-05 RX ADMIN — INSULIN ASPART 2 UNITS: 100 INJECTION, SOLUTION INTRAVENOUS; SUBCUTANEOUS at 04:01

## 2023-01-05 RX ADMIN — GABAPENTIN 100 MG: 100 CAPSULE ORAL at 03:01

## 2023-01-05 RX ADMIN — HYDROCODONE BITARTRATE AND ACETAMINOPHEN 1 TABLET: 10; 325 TABLET ORAL at 01:01

## 2023-01-05 RX ADMIN — CEPHALEXIN 500 MG: 250 CAPSULE ORAL at 01:01

## 2023-01-05 RX ADMIN — HYDROCODONE BITARTRATE AND ACETAMINOPHEN 1 TABLET: 10; 325 TABLET ORAL at 11:01

## 2023-01-05 RX ADMIN — ASPIRIN 81 MG: 81 TABLET, COATED ORAL at 08:01

## 2023-01-05 RX ADMIN — GABAPENTIN 100 MG: 100 CAPSULE ORAL at 08:01

## 2023-01-05 RX ADMIN — ATORVASTATIN CALCIUM 10 MG: 10 TABLET, FILM COATED ORAL at 08:01

## 2023-01-05 RX ADMIN — METFORMIN HYDROCHLORIDE 500 MG: 500 TABLET, FILM COATED ORAL at 08:01

## 2023-01-05 RX ADMIN — METFORMIN HYDROCHLORIDE 500 MG: 500 TABLET, FILM COATED ORAL at 04:01

## 2023-01-05 NOTE — PT/OT/SLP PROGRESS
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    Pt agreeable to therapy. Pt rushes through all exercises and expresses disinterest.     Pain/Comfort  Pain Rating 1: 8/10    Objective:        Functional Mobility:  Bed Mobility:       Transfers:        Functional Ambulation: Mod I    Activities of Daily Living:       Therapeutic Activities and Exercises:  Patient completed the following for increased strength to increase I with ADLs: UBE 6 min x 1x, 5# dowel- shoulder press, chest press, bicep curls x 40, blue theraflex x 40 both ways, green theraband- scapular retraction and rows x 40; chair pushups 3 x 10      AM-PAC 6 CLICK ADL   How much help from another person does this patient currently need?   1 = Unable, Total/Dependent Assistance  2 = A lot, Maximum/Moderate Assistance  3 = A little, Minimum/Contact Guard/Supervision  4 = None, Modified Person/Independent    Putting on and taking off regular lower body clothing? : 3  Bathing (including washing, rinsing, drying)?: 3  Toileting, which includes using toilet, bedpan, or urinal? : 3  Putting on and taking off regular upper body clothing?: 4  Taking care of personal grooming such as brushing teeth?: 4  Eating meals?: 4  Daily Activity Total Score: 21     AM-PAC Raw Score CMS "G-Code Modifier Level of Impairment Assistance   6 % Total / Unable   7 - 8 CM 80 - 100% Maximal Assist   9-13 CL 60 - 80% Moderate Assist   14 - 19 CK 40 " - 60% Moderate Assist   20 - 22 CJ 20 - 40% Minimal Assist   23 CI 1-20% SBA / CGA   24 CH 0% Independent/ Mod I       Patient left up in chair with call button in reach    ASSESSMENT:  Merline Ibarra is a 42 y.o. male with a medical diagnosis of Diabetic ulcer of toe of right foot associated with type 2 diabetes mellitus, with bone involvement without evidence of necrosis and presents with decreased strength, decreased endurance, decreased self-care.    Rehab identified problem list/impairments:  weakness, impaired endurance, impaired self care skills, impaired functional mobility, impaired balance, decreased lower extremity function, decreased safety awareness    Rehab potential is good.    Activity tolerance: Good    Discharge recommendations: home health PT   Barriers to discharge:      Equipment recommendations: walker, rolling    GOALS:   Multidisciplinary Problems       Occupational Therapy Goals          Problem: Occupational Therapy    Goal Priority Disciplines Outcome Interventions   Occupational Therapy Goal     OT, PT/OT Ongoing, Progressing    Description: Description: Grooming Status:   Short Term Goal: Pt will perform grooming with s/u sitting EOB.   Long Term Goal: Pt will perform grooming/oral hygiene standing at sink with mod I      LE dressing Status:   Short Term Goal: Pt will perform LE dressing with s/u.   Long Term Goal: Pt will perform LE dressing with mod I.    Toileting Status:   Short Term Goal: Pt will perform toilet hygiene on BSC with s/u.  Long Term Goal: Pt will perform toilet hygiene on toilet with no AE with Mod I.    Commode Transfer:   Short Term Goal: Pt will perform BSC t/f with s/u.  Long Term Goal:  Pt will perform toilet t/f in bathroom with Mod I.     Bathing Status:   Long Term Goal: Pt will perform sponge bath with s/u with no unsafe fatigue.     Strength Status:   Long Term Goal: Pt to perform BUE strengthening with weights and/or body weight to increase ADL  independence and safety    Endurance Status:   Short Term Goal:pt to perform 15 min OT treatment with 5 or greater rest breaks  Long Term Goal: pt to perform 30 min OT treat with 3 or less rest breaks                         Plan:  Patient to be seen 5 x/week to address the above listed problems via therapeutic exercises  Plan of Care expires:    Plan of Care reviewed with: patient       Milka HerringKenzie, OTR/L    01/05/2023

## 2023-01-05 NOTE — NURSING
Spoke with Manuel from [facility] and pt has been denied approval for snf services, I still have not rec'ed any information from [facility], requested clinicals and notes faxed again to [facility]

## 2023-01-05 NOTE — NURSING
"Pt requests nurse come to  to answer questions regarding insurance denial and discharge tomorrow. At bedside 2 Rns spoke with pt and girlfriend Eleni and attempted to answer questions and provided reassurance. Pt and girlfriend express concerns about going home "being a diabetic with an open wound". Attempted to provided education about home health, wound care, and insurance approval process for SB stay. Both voice they do not understand or agree with insurance denial.  "

## 2023-01-05 NOTE — NURSING
Pt was asked to get up and get ready for therapy to which the pt just grunted and refused to get up.

## 2023-01-05 NOTE — NURSING
Clinicals and noted faxed to both [facility] and  [facility]

## 2023-01-05 NOTE — NURSING
Pt was given his notice of denial of medical coverage from [facility], discussed with pt home health options and he said he didn't care which one, I also told pt that his mother had left a message on my phone to call her and he gave me permission to talk to her, TAYO Reynoso was witness    Jeanne TATE With [facility] notified of referral    I spoke with pt's mother and updated her on insurance information

## 2023-01-06 VITALS
HEART RATE: 101 BPM | HEIGHT: 72 IN | RESPIRATION RATE: 18 BRPM | WEIGHT: 207 LBS | OXYGEN SATURATION: 99 % | SYSTOLIC BLOOD PRESSURE: 103 MMHG | DIASTOLIC BLOOD PRESSURE: 64 MMHG | BODY MASS INDEX: 28.04 KG/M2 | TEMPERATURE: 98 F

## 2023-01-06 LAB
ANION GAP SERPL CALCULATED.3IONS-SCNC: 9 MMOL/L (ref 7–16)
BASOPHILS # BLD AUTO: 0.02 K/UL (ref 0–0.2)
BASOPHILS NFR BLD AUTO: 0.3 % (ref 0–1)
BUN SERPL-MCNC: 7 MG/DL (ref 7–18)
BUN/CREAT SERPL: 9 (ref 6–20)
CALCIUM SERPL-MCNC: 8.8 MG/DL (ref 8.5–10.1)
CHLORIDE SERPL-SCNC: 105 MMOL/L (ref 98–107)
CO2 SERPL-SCNC: 29 MMOL/L (ref 21–32)
CREAT SERPL-MCNC: 0.81 MG/DL (ref 0.7–1.3)
DIFFERENTIAL METHOD BLD: ABNORMAL
EGFR (NO RACE VARIABLE) (RUSH/TITUS): 114 ML/MIN/1.73M²
EOSINOPHIL # BLD AUTO: 0.1 K/UL (ref 0–0.5)
EOSINOPHIL NFR BLD AUTO: 1.6 % (ref 1–4)
EOSINOPHIL NFR BLD MANUAL: 1 % (ref 1–4)
ERYTHROCYTE [DISTWIDTH] IN BLOOD BY AUTOMATED COUNT: 12.2 % (ref 11.5–14.5)
GLUCOSE SERPL-MCNC: 165 MG/DL (ref 74–106)
HCT VFR BLD AUTO: 35.7 % (ref 40–54)
HGB BLD-MCNC: 12.4 G/DL (ref 13.5–18)
IMM GRANULOCYTES # BLD AUTO: 0.09 K/UL (ref 0–0.04)
IMM GRANULOCYTES NFR BLD: 1.4 % (ref 0–0.4)
LYMPHOCYTES # BLD AUTO: 2.08 K/UL (ref 1–4.8)
LYMPHOCYTES NFR BLD AUTO: 33.2 % (ref 27–41)
LYMPHOCYTES NFR BLD MANUAL: 35 % (ref 27–41)
MCH RBC QN AUTO: 30.7 PG (ref 27–31)
MCHC RBC AUTO-ENTMCNC: 34.7 G/DL (ref 32–36)
MCV RBC AUTO: 88.4 FL (ref 80–96)
MONOCYTES # BLD AUTO: 0.51 K/UL (ref 0–0.8)
MONOCYTES NFR BLD AUTO: 8.1 % (ref 2–6)
MONOCYTES NFR BLD MANUAL: 5 % (ref 2–6)
MPC BLD CALC-MCNC: 8.4 FL (ref 9.4–12.4)
NEUTROPHILS # BLD AUTO: 3.47 K/UL (ref 1.8–7.7)
NEUTROPHILS NFR BLD AUTO: 55.4 % (ref 53–65)
NEUTS SEG NFR BLD MANUAL: 59 % (ref 50–62)
NRBC BLD MANUAL-RTO: NORMAL %
PLATELET # BLD AUTO: 508 K/UL (ref 150–400)
PLATELET MORPHOLOGY: NORMAL
POTASSIUM SERPL-SCNC: 4.2 MMOL/L (ref 3.5–5.1)
RBC # BLD AUTO: 4.04 M/UL (ref 4.6–6.2)
RBC MORPH BLD: NORMAL
SODIUM SERPL-SCNC: 139 MMOL/L (ref 136–145)
WBC # BLD AUTO: 6.27 K/UL (ref 4.5–11)

## 2023-01-06 PROCEDURE — 36415 COLL VENOUS BLD VENIPUNCTURE: CPT | Performed by: SPECIALIST

## 2023-01-06 PROCEDURE — 80048 BASIC METABOLIC PNL TOTAL CA: CPT | Performed by: SPECIALIST

## 2023-01-06 PROCEDURE — 99315 PR NURSING FAC DISCHRGE DAY,1-30 MIN: ICD-10-PCS | Mod: ,,, | Performed by: FAMILY MEDICINE

## 2023-01-06 PROCEDURE — 85025 COMPLETE CBC W/AUTO DIFF WBC: CPT | Performed by: SPECIALIST

## 2023-01-06 PROCEDURE — 25000003 PHARM REV CODE 250: Performed by: FAMILY MEDICINE

## 2023-01-06 PROCEDURE — 25000003 PHARM REV CODE 250: Performed by: SPECIALIST

## 2023-01-06 PROCEDURE — 94761 N-INVAS EAR/PLS OXIMETRY MLT: CPT

## 2023-01-06 PROCEDURE — 99315 NF DSCHRG MGMT 30 MIN/LESS: CPT | Mod: ,,, | Performed by: FAMILY MEDICINE

## 2023-01-06 RX ORDER — METOPROLOL TARTRATE 50 MG/1
50 TABLET ORAL 2 TIMES DAILY
COMMUNITY
End: 2023-09-18

## 2023-01-06 RX ORDER — ROSUVASTATIN CALCIUM 10 MG/1
10 TABLET, COATED ORAL NIGHTLY
COMMUNITY

## 2023-01-06 RX ORDER — METOPROLOL TARTRATE 50 MG/1
50 TABLET ORAL 2 TIMES DAILY
Qty: 60 TABLET | Refills: 11 | Status: SHIPPED | OUTPATIENT
Start: 2023-01-06 | End: 2023-09-18

## 2023-01-06 RX ORDER — HYDROCODONE BITARTRATE AND ACETAMINOPHEN 5; 325 MG/1; MG/1
1 TABLET ORAL EVERY 4 HOURS PRN
COMMUNITY
End: 2023-09-18

## 2023-01-06 RX ORDER — METOPROLOL TARTRATE 25 MG/1
50 TABLET, FILM COATED ORAL 2 TIMES DAILY
Qty: 60 TABLET | Refills: 0 | Status: SHIPPED | OUTPATIENT
Start: 2023-01-06 | End: 2023-09-18

## 2023-01-06 RX ADMIN — APIXABAN 5 MG: 2.5 TABLET, FILM COATED ORAL at 08:01

## 2023-01-06 RX ADMIN — METOPROLOL TARTRATE 50 MG: 50 TABLET, FILM COATED ORAL at 08:01

## 2023-01-06 RX ADMIN — HYDROCODONE BITARTRATE AND ACETAMINOPHEN 1 TABLET: 10; 325 TABLET ORAL at 08:01

## 2023-01-06 RX ADMIN — CEPHALEXIN 500 MG: 250 CAPSULE ORAL at 08:01

## 2023-01-06 RX ADMIN — ASPIRIN 81 MG: 81 TABLET, COATED ORAL at 08:01

## 2023-01-06 RX ADMIN — METFORMIN HYDROCHLORIDE 500 MG: 500 TABLET, FILM COATED ORAL at 08:01

## 2023-01-06 RX ADMIN — GABAPENTIN 100 MG: 100 CAPSULE ORAL at 08:01

## 2023-01-06 RX ADMIN — PANTOPRAZOLE SODIUM 40 MG: 40 TABLET, DELAYED RELEASE ORAL at 08:01

## 2023-01-06 NOTE — NURSING
Nurse demonstrated wound care process for pt and girlfriend. Both seem eager to learn and are accepting of teaching.

## 2023-01-06 NOTE — PLAN OF CARE
Problem: Adult Inpatient Plan of Care  Goal: Plan of Care Review  Outcome: Met  Goal: Patient-Specific Goal (Individualized)  Outcome: Met  Goal: Absence of Hospital-Acquired Illness or Injury  Outcome: Met  Goal: Optimal Comfort and Wellbeing  Outcome: Met  Goal: Readiness for Transition of Care  Outcome: Met     Problem: Diabetes Comorbidity  Goal: Blood Glucose Level Within Targeted Range  Outcome: Met     Problem: Impaired Wound Healing  Goal: Optimal Wound Healing  Outcome: Met     Problem: Fall Injury Risk  Goal: Absence of Fall and Fall-Related Injury  Outcome: Met     Problem: Adjustment to Illness (Sepsis/Septic Shock)  Goal: Optimal Coping  Outcome: Met     Problem: Bleeding (Sepsis/Septic Shock)  Goal: Absence of Bleeding  Outcome: Met     Problem: Glycemic Control Impaired (Sepsis/Septic Shock)  Goal: Blood Glucose Level Within Desired Range  Outcome: Met     Problem: Infection Progression (Sepsis/Septic Shock)  Goal: Absence of Infection Signs and Symptoms  Outcome: Met     Problem: Nutrition Impaired (Sepsis/Septic Shock)  Goal: Optimal Nutrition Intake  Outcome: Met     Problem: Fluid and Electrolyte Imbalance (Acute Kidney Injury/Impairment)  Goal: Fluid and Electrolyte Balance  Outcome: Met     Problem: Oral Intake Inadequate (Acute Kidney Injury/Impairment)  Goal: Optimal Nutrition Intake  Outcome: Met     Problem: Renal Function Impairment (Acute Kidney Injury/Impairment)  Goal: Effective Renal Function  Outcome: Met

## 2023-01-06 NOTE — DISCHARGE SUMMARY
[facility] - Medical Surgical Unit  Hospital Medicine  Discharge Summary      Patient Name: Merline Ibarra  MRN: [id number]  BONILLA: [id number]  Patient Class: IP- Swing  Admission Date: 12/30/2022  Hospital Length of Stay: 7 days  Discharge Date and Time:  01/06/2023 7:59 AM  Attending Physician: Angelica Tovar MD   Discharging Provider: Jessy Long MD  Primary Care Provider: Jackie Long DNP, FNP-C    Primary Care Team: Networked reference to record PCT     HPI:   Patient is a 43 yo AA male with a pmhx of diastolic CHF, CAD, HTN, Afib, and DM.  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  Patient accepted to swing bed from [facility] and follow up appointment with wound care Dr. Ruiz at [facility].  Patient with ECHO with EF 55%, mild TR, PA pressure of 26 mmHg.  On metoprolol post Diltiazem infusion for Afib with RVR.  Patient is now stable on oral med.        * No surgery found *      Hospital Course:   1/3/23 -pt. Doing well. No new complaints voiced.    1/4/23 - pt. Complaining of pain. Orders revised.    1/6/23 - pt. Is going home today to continue wound care with home health.       Goals of Care Treatment Preferences:  Code Status: Full Code      Consults:   Consults (From admission, onward)        Status Ordering Provider     Inpatient consult to Social Work  Once        Provider:  (Not yet assigned)    Acknowledged JESSY LONG     Inpatient consult to Registered Dietitian/Nutritionist  Once        Provider:  (Not yet assigned)    Acknowledged ANGELICA TOVAR     Inpatient consult to Registered Dietitian/Nutritionist  Once        Provider:  (Not yet assigned)    Acknowledged ANGELICA TOVAR          No new Assessment & Plan notes have been filed under this hospital service since the last note was generated.  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    Significant Diagnostic Studies: Labs: All labs within the past 24 hours have been reviewed    Pending Diagnostic Studies:     None         Medications:  Reconciled Home Medications:      Medication List      CHANGE how you take these medications    * metoprolol tartrate 50 MG tablet  Commonly known as: LOPRESSOR  Take 1 tablet (50 mg total) by mouth 2 (two) times daily.  What changed: You were already taking a medication with the same name, and this prescription was added. Make sure you understand how and when to take each.     * metoprolol tartrate 25 MG tablet  Commonly known as: LOPRESSOR  Take 2 tablets (50 mg total) by mouth 2 (two) times daily.  What changed: how much to take         * This list has 2 medication(s) that are the same as other medications prescribed for you. Read the directions carefully, and ask your doctor or other care provider to review them with you.            CONTINUE taking these medications    aspirin 81 MG EC tablet  Commonly known as: ECOTRIN  Take 81 mg by mouth once daily.     digoxin 125 mcg tablet  Commonly known as: LANOXIN  Take 2 tablets (250 mcg total) by mouth once daily. Recheck with cardiologist for continuation of this medication for 14 days     ELIQUIS 5 mg Tab  Generic drug: apixaban  Take 1 tablet (5 mg total) by mouth 2 (two) times daily.     gabapentin 100 MG capsule  Commonly known as: NEURONTIN  Take 100 mg by mouth 3 (three) times daily.     metFORMIN 500 MG tablet  Commonly known as:  GLUCOPHAGE  Take 1 tablet (500 mg total) by mouth 2 (two) times daily with meals.     VICTOZA 3-SOFI 0.6 mg/0.1 mL (18 mg/3 mL) Pnij pen  Generic drug: liraglutide 0.6 mg/0.1 mL (18 mg/3 mL) subq PNIJ  Inject 0.6 mg into the skin once daily.            Indwelling Lines/Drains at time of discharge:   Lines/Drains/Airways     None                 Time spent on the discharge of patient: 30- minutes         Jessy Dee MD  Department of Hospital Medicine  [facility] - Medical Surgical Unit

## 2023-01-06 NOTE — PLAN OF CARE
Problem: Adult Inpatient Plan of Care  Goal: Plan of Care Review  Outcome: Ongoing, Progressing  Flowsheets (Taken 1/5/2023 2830)  Plan of Care Reviewed With:   patient   significant other  Goal: Patient-Specific Goal (Individualized)  Outcome: Ongoing, Progressing  Goal: Absence of Hospital-Acquired Illness or Injury  Outcome: Ongoing, Progressing  Goal: Optimal Comfort and Wellbeing  Outcome: Ongoing, Progressing  Goal: Readiness for Transition of Care  Outcome: Ongoing, Progressing     Problem: Diabetes Comorbidity  Goal: Blood Glucose Level Within Targeted Range  Outcome: Ongoing, Progressing     Problem: Impaired Wound Healing  Goal: Optimal Wound Healing  Outcome: Ongoing, Progressing     Problem: Fall Injury Risk  Goal: Absence of Fall and Fall-Related Injury  Outcome: Ongoing, Progressing     Problem: Glycemic Control Impaired (Sepsis/Septic Shock)  Goal: Blood Glucose Level Within Desired Range  Outcome: Ongoing, Progressing     Problem: Nutrition Impaired (Sepsis/Septic Shock)  Goal: Optimal Nutrition Intake  Outcome: Ongoing, Progressing

## 2023-01-06 NOTE — NURSING
"Patient given discharge instructions. Instructed patient that wound care needed to be done to foot. Nurse states that she will do wound care when family is here so that family can assist and education can be reinforced. He states "he will get it done at home by his girlfriend or his auntie, they know how to do it".     Dr. Ruiz's office contacted, appointment made. Patient informed of appt. Patient information faxed over per request.    [facility] contacted, requests patient information before appointment is made. Information faxed per request. Pt aware and will be waiting for appointment.    PCP, Dr. Dee's office contacted. Computers are down at this time. Instructed patient to make appointment within 1 week.     [facility] contacted regarding order for cane. They report that cane is on delivery truck and will be delivered to hospital this morning. Pt notified.  "

## 2023-01-09 ENCOUNTER — TELEPHONE (OUTPATIENT)
Dept: PRIMARY CARE CLINIC | Facility: CLINIC | Age: 43
End: 2023-01-09
Payer: MEDICARE

## 2023-01-09 NOTE — TELEPHONE ENCOUNTER
Spoke with pt for TCC call. Pt declined f/u with PCP GURDEEP Long. Pt stated his wife has scheduled appt with a new provider. Pt did not give name of provider. Pt reports he will see cardiologist tomorrow d/t elevated heart rate. He states he has appt with [facility]. He reports  nurse made visit yesterday. Pt reports he has all medications. He vu to increase metoprolol. Pt denies any needs at this time.

## 2023-01-09 NOTE — NURSING
72 hour post d/c call back: pt states he is doing well, home health came out to see him yesterday and he has an appt with wound care and his cardiologist tomorrow

## 2023-02-07 NOTE — NURSING
30 days post d/c follow up: pt states he is doing well, he did have a cardioversion in an outpt. Setting but hasn't been admitted to inpt. Since d/c.

## 2023-03-09 DIAGNOSIS — Z71.89 COMPLEX CARE COORDINATION: ICD-10-CM

## 2023-04-10 PROBLEM — N17.9 ACUTE KIDNEY INJURY: Status: RESOLVED | Noted: 2023-01-04 | Resolved: 2023-04-10

## 2023-04-10 PROBLEM — A41.9 SEPSIS: Status: RESOLVED | Noted: 2023-01-04 | Resolved: 2023-04-10

## 2023-04-10 PROBLEM — N39.0 URINARY TRACT INFECTION: Status: RESOLVED | Noted: 2023-01-04 | Resolved: 2023-04-10

## 2023-05-16 ENCOUNTER — PATIENT OUTREACH (OUTPATIENT)
Dept: ADMINISTRATIVE | Facility: HOSPITAL | Age: 43
End: 2023-05-16

## 2023-05-16 NOTE — PROGRESS NOTES
Health Maintenance Due   Topic Date Due    Hepatitis C Screening  Never done    Lipid Panel  Never done    COVID-19 Vaccine (1) Never done    Diabetes Urine Screening  Never done    Pneumococcal Vaccines (Age 0-64) (1 - PCV) Never done    Foot Exam  Never done    Eye Exam  Never done    HIV Screening  Never done    TETANUS VACCINE  Never done    Hemoglobin A1c  03/30/2023 05/16/2023   Telephone Call made to patient. (Success)  Patient stated he now sees a different PCP, declined to answer any further questions.  Care Everywhere updates requested and reviewed.  Media reports reviewed.  [facility] and [facility] reviewed.  [facility] reviewed.  Next appointment: No future appointment

## 2023-07-05 ENCOUNTER — TELEPHONE (OUTPATIENT)
Dept: PRIMARY CARE CLINIC | Facility: CLINIC | Age: 43
End: 2023-07-05
Payer: MEDICARE

## 2023-07-05 NOTE — TELEPHONE ENCOUNTER
----- Message from Jackie Long DNP, PAULA-C sent at 7/5/2023  8:20 AM CDT -----  Regarding: needs appointment  This patient has not been in clinic in a while. He needs a follow up appointment to be scheduled for next week. Monday has some openings. Please notify patient of need for appointment.

## 2023-07-10 ENCOUNTER — DOCUMENT SCAN (OUTPATIENT)
Dept: HOME HEALTH SERVICES | Facility: HOSPITAL | Age: 43
End: 2023-07-10
Payer: MEDICARE

## 2023-07-10 ENCOUNTER — EXTERNAL HOME HEALTH (OUTPATIENT)
Dept: HOME HEALTH SERVICES | Facility: HOSPITAL | Age: 43
End: 2023-07-10
Payer: MEDICARE

## 2023-07-11 ENCOUNTER — TELEPHONE (OUTPATIENT)
Dept: PRIMARY CARE CLINIC | Facility: CLINIC | Age: 43
End: 2023-07-11
Payer: MEDICARE

## 2023-07-11 NOTE — TELEPHONE ENCOUNTER
----- Message from Jackie Long DNP, FNP-C sent at 7/10/2023  1:32 PM CDT -----  Regarding: needs appointment  Please call patient and let him know he needs to make an appointment for routine check up

## 2023-07-17 ENCOUNTER — TELEPHONE (OUTPATIENT)
Dept: PRIMARY CARE CLINIC | Facility: CLINIC | Age: 43
End: 2023-07-17
Payer: MEDICARE

## 2023-07-17 NOTE — TELEPHONE ENCOUNTER
----- Message from Jackie Long DNP, LOSP-C sent at 7/11/2023  3:06 PM CDT -----  Regarding: re: office visit  Please call [facility] regarding request for recertification. Patient needs to have an office visit before signing off any more home health orders.     
Talked with Kiki at [facility] and she was notified that DONNIE Martines DNP will not be able to sign any more orders until patient has a office visit. DONNIE Martines DNP has not seen patient. Last office visit patient saw Dr. Dee 03/31/2023. Kiki from [facility] verbalized understanding at states she will have his nurse to notify him on her visit with him 07/20/23.  
[id number]

## 2023-08-18 ENCOUNTER — DOCUMENT SCAN (OUTPATIENT)
Dept: HOME HEALTH SERVICES | Facility: HOSPITAL | Age: 43
End: 2023-08-18
Payer: MEDICARE

## 2023-08-21 ENCOUNTER — EXTERNAL HOME HEALTH (OUTPATIENT)
Dept: HOME HEALTH SERVICES | Facility: HOSPITAL | Age: 43
End: 2023-08-21
Payer: MEDICARE

## 2023-09-11 ENCOUNTER — PATIENT OUTREACH (OUTPATIENT)
Dept: ADMINISTRATIVE | Facility: HOSPITAL | Age: 43
End: 2023-09-11

## 2023-09-11 NOTE — PROGRESS NOTES
09/11/2023   --Chart accessed for:humana report  --Care Gaps addressed: all topics  Outreach made to patient via telephone--left message  Care Everywhere updates requested and reviewed.  Media reports reviewed.  [facility] and [facility] reviewed.  Immunization Database ([employer]) reviewed. Vaccinations uploaded:none  [facility] abstracted.

## 2023-09-18 ENCOUNTER — HOSPITAL ENCOUNTER (EMERGENCY)
Facility: HOSPITAL | Age: 43
Discharge: HOME | End: 2023-09-18
Attending: INTERNAL MEDICINE
Payer: MEDICARE

## 2023-09-18 VITALS
TEMPERATURE: 98 F | HEART RATE: 97 BPM | DIASTOLIC BLOOD PRESSURE: 86 MMHG | OXYGEN SATURATION: 100 % | SYSTOLIC BLOOD PRESSURE: 124 MMHG | BODY MASS INDEX: 27.23 KG/M2 | RESPIRATION RATE: 16 BRPM | WEIGHT: 201.06 LBS | HEIGHT: 72 IN

## 2023-09-18 DIAGNOSIS — R07.9 CHEST PAIN: ICD-10-CM

## 2023-09-18 DIAGNOSIS — Z79.4 TYPE 2 DIABETES MELLITUS WITH HYPERGLYCEMIA, WITH LONG-TERM CURRENT USE OF INSULIN: ICD-10-CM

## 2023-09-18 DIAGNOSIS — R42 DIZZINESS: Primary | ICD-10-CM

## 2023-09-18 DIAGNOSIS — E11.65 TYPE 2 DIABETES MELLITUS WITH HYPERGLYCEMIA, WITH LONG-TERM CURRENT USE OF INSULIN: ICD-10-CM

## 2023-09-18 LAB
ACETONE SERPL QL SCN: NEGATIVE
ALBUMIN SERPL BCP-MCNC: 3.4 G/DL (ref 3.5–5)
ALBUMIN/GLOB SERPL: 0.8 {RATIO}
ALP SERPL-CCNC: 59 U/L (ref 45–115)
ALT SERPL W P-5'-P-CCNC: 8 U/L (ref 16–61)
AMPHET UR QL SCN: NEGATIVE
ANION GAP SERPL CALCULATED.3IONS-SCNC: 13 MMOL/L (ref 7–16)
AST SERPL W P-5'-P-CCNC: 14 U/L (ref 15–37)
BACTERIA #/AREA URNS HPF: ABNORMAL /HPF
BARBITURATES UR QL SCN: NEGATIVE
BASOPHILS # BLD AUTO: 0.05 K/UL (ref 0–0.2)
BASOPHILS NFR BLD AUTO: 0.7 % (ref 0–1)
BENZODIAZ METAB UR QL SCN: NEGATIVE
BILIRUB SERPL-MCNC: 0.6 MG/DL (ref ?–1.2)
BILIRUB UR QL STRIP: NEGATIVE
BUN SERPL-MCNC: 5 MG/DL (ref 7–18)
BUN/CREAT SERPL: 5 (ref 6–20)
CALCIUM SERPL-MCNC: 9.4 MG/DL (ref 8.5–10.1)
CANNABINOIDS UR QL SCN: NEGATIVE
CHLORIDE SERPL-SCNC: 102 MMOL/L (ref 98–107)
CLARITY UR: CLEAR
CO2 SERPL-SCNC: 27 MMOL/L (ref 21–32)
COCAINE UR QL SCN: NEGATIVE
COLOR UR: YELLOW
CREAT SERPL-MCNC: 1.11 MG/DL (ref 0.7–1.3)
D DIMER PPP FEU-MCNC: 0.51 ΜG/ML (ref 0–0.47)
DIFFERENTIAL METHOD BLD: ABNORMAL
EGFR (NO RACE VARIABLE) (RUSH/TITUS): 85 ML/MIN/1.73M2
EOSINOPHIL # BLD AUTO: 0.18 K/UL (ref 0–0.5)
EOSINOPHIL NFR BLD AUTO: 2.6 % (ref 1–4)
ERYTHROCYTE [DISTWIDTH] IN BLOOD BY AUTOMATED COUNT: 12.3 % (ref 11.5–14.5)
GLOBULIN SER-MCNC: 4.4 G/DL (ref 2–4)
GLUCOSE SERPL-MCNC: 273 MG/DL (ref 70–105)
GLUCOSE SERPL-MCNC: 323 MG/DL (ref 70–105)
GLUCOSE SERPL-MCNC: 354 MG/DL (ref 74–106)
GLUCOSE UR STRIP-MCNC: 500 MG/DL
HCT VFR BLD AUTO: 37.1 % (ref 40–54)
HGB BLD-MCNC: 13.3 G/DL (ref 13.5–18)
IMM GRANULOCYTES # BLD AUTO: 0.03 K/UL (ref 0–0.04)
IMM GRANULOCYTES NFR BLD: 0.4 % (ref 0–0.4)
KETONES UR STRIP-SCNC: NEGATIVE MG/DL
LEUKOCYTE ESTERASE UR QL STRIP: NEGATIVE
LYMPHOCYTES # BLD AUTO: 2.06 K/UL (ref 1–4.8)
LYMPHOCYTES NFR BLD AUTO: 29.4 % (ref 27–41)
MCH RBC QN AUTO: 29.8 PG (ref 27–31)
MCHC RBC AUTO-ENTMCNC: 35.8 G/DL (ref 32–36)
MCV RBC AUTO: 83 FL (ref 80–96)
MONOCYTES # BLD AUTO: 0.41 K/UL (ref 0–0.8)
MONOCYTES NFR BLD AUTO: 5.9 % (ref 2–6)
MPC BLD CALC-MCNC: 10.1 FL (ref 9.4–12.4)
NEUTROPHILS # BLD AUTO: 4.27 K/UL (ref 1.8–7.7)
NEUTROPHILS NFR BLD AUTO: 61 % (ref 53–65)
NITRITE UR QL STRIP: NEGATIVE
NRBC # BLD AUTO: 0 X10E3/UL
NRBC, AUTO (.00): 0 %
NT-PROBNP SERPL-MCNC: 141 PG/ML (ref 1–125)
OPIATES UR QL SCN: NEGATIVE
PCP UR QL SCN: NEGATIVE
PH UR STRIP: 5.5 PH UNITS
PLATELET # BLD AUTO: 331 K/UL (ref 150–400)
POTASSIUM SERPL-SCNC: 3.7 MMOL/L (ref 3.5–5.1)
PROT SERPL-MCNC: 7.8 G/DL (ref 6.4–8.2)
PROT UR QL STRIP: 30
RBC # BLD AUTO: 4.47 M/UL (ref 4.6–6.2)
RBC # UR STRIP: ABNORMAL /UL
RBC #/AREA URNS HPF: ABNORMAL /HPF
SODIUM SERPL-SCNC: 138 MMOL/L (ref 136–145)
SP GR UR STRIP: 1.01
SQUAMOUS #/AREA URNS LPF: ABNORMAL /LPF
TROPONIN I SERPL DL<=0.01 NG/ML-MCNC: 4.9 PG/ML
UROBILINOGEN UR STRIP-ACNC: 0.2 MG/DL
WBC # BLD AUTO: 7 K/UL (ref 4.5–11)
WBC #/AREA URNS HPF: ABNORMAL /HPF

## 2023-09-18 PROCEDURE — 85379 FIBRIN DEGRADATION QUANT: CPT | Performed by: INTERNAL MEDICINE

## 2023-09-18 PROCEDURE — 80053 COMPREHEN METABOLIC PANEL: CPT | Performed by: INTERNAL MEDICINE

## 2023-09-18 PROCEDURE — 25500020 PHARM REV CODE 255: Performed by: INTERNAL MEDICINE

## 2023-09-18 PROCEDURE — 25000003 PHARM REV CODE 250: Performed by: INTERNAL MEDICINE

## 2023-09-18 PROCEDURE — 84484 ASSAY OF TROPONIN QUANT: CPT | Performed by: INTERNAL MEDICINE

## 2023-09-18 PROCEDURE — 63600175 PHARM REV CODE 636 W HCPCS: Performed by: INTERNAL MEDICINE

## 2023-09-18 PROCEDURE — 99285 EMERGENCY DEPT VISIT HI MDM: CPT | Mod: 25

## 2023-09-18 PROCEDURE — 82962 GLUCOSE BLOOD TEST: CPT | Mod: 91

## 2023-09-18 PROCEDURE — 82009 KETONE BODYS QUAL: CPT | Performed by: INTERNAL MEDICINE

## 2023-09-18 PROCEDURE — 93010 EKG 12-LEAD: ICD-10-PCS | Mod: ,,, | Performed by: INTERNAL MEDICINE

## 2023-09-18 PROCEDURE — 93010 ELECTROCARDIOGRAM REPORT: CPT | Mod: ,,, | Performed by: INTERNAL MEDICINE

## 2023-09-18 PROCEDURE — 85025 COMPLETE CBC W/AUTO DIFF WBC: CPT | Performed by: INTERNAL MEDICINE

## 2023-09-18 PROCEDURE — 93005 ELECTROCARDIOGRAM TRACING: CPT

## 2023-09-18 PROCEDURE — 99284 EMERGENCY DEPT VISIT MOD MDM: CPT | Performed by: INTERNAL MEDICINE

## 2023-09-18 PROCEDURE — 96361 HYDRATE IV INFUSION ADD-ON: CPT

## 2023-09-18 PROCEDURE — 80307 DRUG TEST PRSMV CHEM ANLYZR: CPT | Performed by: INTERNAL MEDICINE

## 2023-09-18 PROCEDURE — 96374 THER/PROPH/DIAG INJ IV PUSH: CPT

## 2023-09-18 PROCEDURE — 81001 URINALYSIS AUTO W/SCOPE: CPT | Performed by: INTERNAL MEDICINE

## 2023-09-18 PROCEDURE — 83880 ASSAY OF NATRIURETIC PEPTIDE: CPT | Performed by: INTERNAL MEDICINE

## 2023-09-18 RX ORDER — GABAPENTIN 400 MG/1
400 CAPSULE ORAL 3 TIMES DAILY
COMMUNITY

## 2023-09-18 RX ORDER — INSULIN GLARGINE 100 [IU]/ML
10 INJECTION, SOLUTION SUBCUTANEOUS NIGHTLY
COMMUNITY
Start: 2023-08-04

## 2023-09-18 RX ORDER — SOTALOL HYDROCHLORIDE 80 MG/1
1 TABLET ORAL 2 TIMES DAILY
COMMUNITY
Start: 2023-08-09

## 2023-09-18 RX ADMIN — HUMAN INSULIN 5 UNITS: 100 INJECTION, SOLUTION SUBCUTANEOUS at 11:09

## 2023-09-18 RX ADMIN — SODIUM CHLORIDE 250 ML: 9 INJECTION, SOLUTION INTRAVENOUS at 11:09

## 2023-09-18 RX ADMIN — IOPAMIDOL 100 ML: 755 INJECTION, SOLUTION INTRAVENOUS at 12:09

## 2023-09-18 NOTE — ED TRIAGE NOTES
Pt reports some dizziness and some chest pain began 2 days ago and worse today  
Additional Progress Note...

## 2023-09-18 NOTE — ED NOTES
Spoke with wound care clinic at [facility], per Dr Bullard's request, pt is not able to go in today d/t transportation issues, clinic advised pt can come tomorrow at 11, pt states he has to give 72 hrs notice for transportation and has appt on Thursday, advised pt to verify with wound clinic that he will keep appt on Thursday and cannot come tomorrow because this back and forth is likely to cause some confusion. Pt states he will contact wound care

## 2023-09-18 NOTE — ED PROVIDER NOTES
Encounter Date: 9/18/2023       History     Chief Complaint   Patient presents with    Dizziness    Chest Pain     Patient comes in complaining about dizziness and chest pain for the last 2 days.  The chest pain is nonexertional, non angina.  Patient is a gets dizzy when he pees.  Patient denies any headache, nausea vomiting, fever chills, abdominal pain.  The patient states he was hospitalized [facility] 2 weeks ago with amputation of the right toe.    Patient has history of chronic atrial fibrillation he is on amiodarone, and Eliquis.  Patient said he underwent cardioversion several months ago at [facility].  His cardiologist want to put in an implantable monitor.  He also is [facility] by [facility] of his right foot but has not been there for several weeks.        Review of patient's allergies indicates:   Allergen Reactions    Lisinopril Swelling     Past Medical History:   Diagnosis Date    A-fib     CHF (congestive heart failure)     Diabetes mellitus     Hypertension      Past Surgical History:   Procedure Laterality Date    CARDIOVERSION       Family History   Problem Relation Age of Onset    Diabetes Mother     Heart disease Mother     Diabetes Father     Heart disease Father      Social History     Tobacco Use    Smoking status: Former    Smokeless tobacco: Never   Substance Use Topics    Alcohol use: Yes     Comment: OCCASIONALLY     Drug use: Yes     Types: Marijuana     Review of Systems   Constitutional:  Negative for fever.   HENT:  Negative for sore throat.    Respiratory:  Negative for shortness of breath.    Cardiovascular:  Negative for chest pain.   Gastrointestinal:  Negative for nausea.   Genitourinary:  Negative for dysuria.   Musculoskeletal:  Negative for back pain.   Skin:  Negative for rash.   Neurological:  Negative for weakness.   Hematological:  Does not bruise/bleed easily.       Physical Exam     Initial Vitals [09/18/23 1118]   BP Pulse Resp Temp SpO2    118/75 108 18 98.2 °F (36.8 °C) 99 %      MAP       --         Physical Exam    Nursing note and vitals reviewed.  Constitutional: He appears well-developed.   HENT:   Head: Normocephalic.   Eyes: Pupils are equal, round, and reactive to light.   Neck:   Normal range of motion.  Cardiovascular:  Normal heart sounds and normal pulses. An irregularly irregular rhythm present.   Tachycardia present.         Pulmonary/Chest: Effort normal and breath sounds normal.   Abdominal: Abdomen is soft and flat. Bowel sounds are normal. There is no abdominal tenderness.   Musculoskeletal:         General: Normal range of motion.      Cervical back: Normal range of motion.     Neurological: He is alert. He has normal strength and normal reflexes. No cranial nerve deficit or sensory deficit. GCS eye subscore is 4. GCS verbal subscore is 5. GCS motor subscore is 6.   Skin: Skin is warm.         Medical Screening Exam   See Full Note    ED Course   Procedures  Labs Reviewed   COMPREHENSIVE METABOLIC PANEL - Abnormal; Notable for the following components:       Result Value    Glucose 354 (*)     BUN 5 (*)     BUN/Creatinine Ratio 5 (*)     Albumin 3.4 (*)     Globulin 4.4 (*)     ALT 8 (*)     AST 14 (*)     All other components within normal limits   NT-PRO NATRIURETIC PEPTIDE - Abnormal; Notable for the following components:    ProBNP 141 (*)     All other components within normal limits   D DIMER, QUANTITATIVE - Abnormal; Notable for the following components:    D-Dimer 0.51 (*)     All other components within normal limits   CBC WITH DIFFERENTIAL - Abnormal; Notable for the following components:    RBC 4.47 (*)     Hemoglobin 13.3 (*)     Hematocrit 37.1 (*)     All other components within normal limits   POCT GLUCOSE MONITORING CONTINUOUS - Abnormal; Notable for the following components:    POC Glucose 323 (*)     All other components within normal limits   POCT GLUCOSE MONITORING CONTINUOUS - Abnormal; Notable for the  following components:    POC Glucose 273 (*)     All other components within normal limits   TROPONIN I - Normal   CBC W/ AUTO DIFFERENTIAL    Narrative:     The following orders were created for panel order CBC auto differential.  Procedure                               Abnormality         Status                     ---------                               -----------         ------                     CBC with Differential[[id number]]       Abnormal            Final result                 Please view results for these tests on the individual orders.   ACETONE   DRUG SCREEN, URINE (BEAKER)   URINALYSIS, REFLEX TO URINE CULTURE     EKG Readings: (Independently Interpreted)   Previous EKG: Compared with most recent EKG Previous EKG Date: 07/19/2022, 12/20/2022. Rhythm: Atrial Flutter. Heart Rate: 111. Ectopy: No Ectopy. ST Segments: Non-Specific ST Segment Depression. T Waves: Normal. Axis: Left Axis Deviation. Clinical Impression: Atrial Flutter   Atrial flutter with first-degree AV block rate of 111, no acute ischemic changes, similar to previous EKG in the past.       Imaging Results              CTA Chest Non-Coronary (PE Studies) (Final result)  Result time 09/18/23 12:40:31      Final result by Luis Farah MD (09/18/23 12:40:31)                   Impression:      No pulmonary embolus or acute abnormality in the chest.      Electronically signed by: Luis Farah  Date:    09/18/2023  Time:    12:40               Narrative:    EXAMINATION:  CTA CHEST NON CORONARY (PE STUDIES)    CLINICAL HISTORY:  Pulmonary embolism (PE) suspected, high prob;    TECHNIQUE:  Low dose axial images, sagittal and coronal reformations were obtained from the thoracic inlet to the lung bases following the IV administration of 100 mL of Isovue 370.  Contrast timing was optimized to evaluate the pulmonary arteries.  MIP images were performed.    The CT examination was performed using one or more of the following dose reduction techniques:  Automated exposure control, adjustment of the mA and kV according to patient's size, use of acute or iterative reconstruction techniques.    COMPARISON:  Chest radiograph 09/18/2023    FINDINGS:  No pulmonary embolus.    The heart and thoracic aorta are normal.  No adenopathy.  Several small calcified granulomas are seen in the hilar region as well as the upper abdomen including the liver and spleen.    Lungs are clear bilaterally.  No pneumothorax.  No pleural effusion.    No acute fracture.  No acute abnormality of the upper abdomen.                                       CT Head Without Contrast (Final result)  Result time 09/18/23 12:07:21      Final result by Luis Farah MD (09/18/23 12:07:21)                   Impression:      No acute intracranial abnormality.      Electronically signed by: Luis Farah  Date:    09/18/2023  Time:    12:07               Narrative:    EXAMINATION:  CT HEAD WITHOUT CONTRAST    CLINICAL HISTORY:  Dizziness, persistent/recurrent, cardiac or vascular cause suspected;    TECHNIQUE:  CT of the head performed without the use of intravenous contrast.  The CT examination was performed using one or more of the following dose reduction techniques: Automated exposure control, adjustment of the mA and kV according to patient's size, use of acute or iterative reconstruction techniques.    COMPARISON:  7/24/2021.    FINDINGS:  No acute intracranial hemorrhage, mass, infarct, or fluid collection.  Mild chronic microvascular ischemic changes.  Vascular calcifications.  Ventricles, sulci, and cisterns appear normal.  No mass effect or midline shift.  Calvarium intact.  Mastoid air cells and paranasal sinuses clear.                                       X-Ray Chest AP Portable (Final result)  Result time 09/18/23 12:07:53      Final result by Luis Farah MD (09/18/23 12:07:53)                   Impression:      No acute findings.      Electronically signed by: Luis  Ray  Date:    09/18/2023  Time:    12:07               Narrative:    EXAMINATION:  XR CHEST AP PORTABLE    CLINICAL HISTORY:  Chest Pain;    TECHNIQUE:  Single frontal view of the chest was performed.    COMPARISON:  07/19/2022    FINDINGS:  Heart size normal. Lungs clear. No pneumothorax or pleural effusion.                                    X-Rays:   Independently Interpreted Readings:   Other Readings:  CHEST X-RAY SHOWS NO ACUTE FINDINGS COME INFILTRATE CARDIOMEGALY OR CHF    Medications   insulin regular injection 5 Units 0.05 mL (5 Units Intravenous Given 9/18/23 1156)   sodium chloride 0.9% bolus 250 mL 250 mL (0 mLs Intravenous Stopped 9/18/23 1238)   iopamidoL (ISOVUE-370) injection 100 mL (100 mLs Intravenous Given 9/18/23 1232)     Medical Decision Making  Patient comes in with dizziness and chest pain that is nonexertional for last 2 days.  States he gets dizzy when he urinates.  Patient is status post amputation right toe at [facility] 2 weeks ago.  Also has history of atrial fibrillation on Xarelto and amiodarone.  Rule out CNS lesion terms of stroke bleed, rule out cardiac ischemic episode, possibly arrhythmia related, urinary tract infection, or diabetic ketoacidosis.    Amount and/or Complexity of Data Reviewed  Labs: ordered. Decision-making details documented in ED Course.  Radiology: ordered. Decision-making details documented in ED Course.  ECG/medicine tests:  Decision-making details documented in ED Course.  Discussion of management or test interpretation with external provider(s): Patient presents with dizziness without any focal neurologic deficits, CT scan brain was negative electrolytes all within normal limits.  He also had none exertional non angina chest pain with EKG showing atrial flutter initially with a rate of 111 but is down to 90.  The patient cardiac workup including troponin, BNP, chest x-ray was negative.  He had elevated D-dimer, CTA of his chest showed normal  cardiopulmonary without any abnormalities.  We call the wound center [place] where the patient goes there, they would see him tomorrow.  We also advised him to follow back up with Dr. Campbell's office which he stopped seeing therefore his home health was terminated.  He also advised he can go there as a walk-in tomorrow as well.  Also advised control his blood sugars and follow with his cardiologist and with his surgeon.    Risk  OTC drugs.  Prescription drug management.      Additional MDM:   PERC Rule:   Age is greater than or equal to 50 = 0.0  Heart Rate is greater than or equal to 100 = 1.0  SaO2 on room air < 95% = 0.0  Unilateral leg swelling = 0.0  Hemoptysis = 0.0  Recent surgery or trauma = 1.0  Prior PE or DVT =  0.0  Hormone use = 0.00  PERC Score = 2        Well's Criteria Score:  -Clinical symptoms of DVT (leg swelling, pain with palpation) = 0.0  -PE is #1 diagnosis OR equally likely =            0.0  -Heart Rate >100 =   1.5  -Immobilization (= or > than 3 days) or surgery in the previous 4 weeks = 1.5  -Previous DVT/PE = 0.0  -Hemoptysis =          0.0  -Malignancy =           0.0  Well's Probability Score =    3      Heart Score:    History:          Slightly suspicious.  ECG:             Nonspecific repolarisation disturbance  Age:               Less than 45 years  Risk factors: 1-2 risk factors  Troponin:       Less than or equal to normal limit  Heart Score = 2          KRISHNA Score:   Age over 65:                                    0.00   > or = to 3 CAD risk factors:           0.00  Established CAD:                            0.00  > or = to 2 anginal events in the past 24 hours: 0.00  Use of ASA in past 7 days:              0.00  Elevated Enzymes:                         0.00  ST Depression > or = to 0.05 mV:  0.00  KRISHNA score = 0              ED Course as of 09/18/23 1256   Mon Sep 18, 2023   1148 CBC auto differential(!) [PW]   1158 Acetone, Bld: Negative [PW]   1158 D dimer, quantitative(!)  [PW]   1158 D-Dimer(!): 0.51 [PW]   1205 NT-proBNP(!): 141 [PW]   1205 Troponin I High Sensitivity: 4.9 [PW]   1205 Acetone, Bld: Negative [PW]   1205 Comprehensive metabolic panel(!) [PW]   1222 X-Ray Chest AP Portable [PW]   1222 CT Head Without Contrast [PW]   1251 POC Glucose(!): 273 [PW]   1251 CTA Chest Non-Coronary (PE Studies) [PW]      ED Course User Index  [PW] Bharat Bullard MD                    Clinical Impression:   Final diagnoses:  [R07.9] Chest pain  [R42] Dizziness (Primary)  [E11.65, Z79.4] Type 2 diabetes mellitus with hyperglycemia, with long-term current use of insulin        ED Disposition Condition    Discharge Stable          ED Prescriptions    None       Follow-up Information       Follow up With Specialties Details Why Contact Info    Jackie Long DNP, FNP-C Family Medicine In 1 day  [address] [facility]  [place] [address]  [phone]               Bharat Bullard MD  09/18/23 5178

## 2024-06-18 ENCOUNTER — LAB REQUISITION (OUTPATIENT)
Dept: LAB | Facility: HOSPITAL | Age: 44
End: 2024-06-18
Attending: NURSE PRACTITIONER
Payer: MEDICARE

## 2024-06-18 DIAGNOSIS — E11.8 TYPE 2 DIABETES MELLITUS WITH UNSPECIFIED COMPLICATIONS: ICD-10-CM

## 2024-06-18 DIAGNOSIS — L97.512 NON-PRESSURE CHRONIC ULCER OF OTHER PART OF RIGHT FOOT WITH FAT LAYER EXPOSED: ICD-10-CM

## 2024-06-18 DIAGNOSIS — M86.9 OSTEOMYELITIS, UNSPECIFIED: ICD-10-CM

## 2024-06-18 LAB
ANION GAP SERPL CALCULATED.3IONS-SCNC: 14 MMOL/L (ref 7–16)
BASOPHILS # BLD AUTO: 0.04 K/UL (ref 0–0.2)
BASOPHILS NFR BLD AUTO: 0.8 % (ref 0–1)
BUN SERPL-MCNC: 13 MG/DL (ref 7–18)
BUN/CREAT SERPL: 9 (ref 6–20)
CALCIUM SERPL-MCNC: 9.5 MG/DL (ref 8.5–10.1)
CHLORIDE SERPL-SCNC: 101 MMOL/L (ref 98–107)
CK SERPL-CCNC: 96 U/L (ref 39–308)
CO2 SERPL-SCNC: 29 MMOL/L (ref 21–32)
CREAT SERPL-MCNC: 1.39 MG/DL (ref 0.7–1.3)
DIFFERENTIAL METHOD BLD: ABNORMAL
EGFR (NO RACE VARIABLE) (RUSH/TITUS): 65 ML/MIN/1.73M2
EOSINOPHIL # BLD AUTO: 0.05 K/UL (ref 0–0.5)
EOSINOPHIL NFR BLD AUTO: 1 % (ref 1–4)
ERYTHROCYTE [DISTWIDTH] IN BLOOD BY AUTOMATED COUNT: 12.5 % (ref 11.5–14.5)
ERYTHROCYTE [SEDIMENTATION RATE] IN BLOOD BY WESTERGREN METHOD: 16 MM/HR (ref 0–15)
GLUCOSE SERPL-MCNC: 618 MG/DL (ref 74–106)
HCT VFR BLD AUTO: 39.5 % (ref 40–54)
HGB BLD-MCNC: 13.8 G/DL (ref 13.5–18)
IMM GRANULOCYTES # BLD AUTO: 0.03 K/UL (ref 0–0.04)
IMM GRANULOCYTES NFR BLD: 0.6 % (ref 0–0.4)
LYMPHOCYTES # BLD AUTO: 1.31 K/UL (ref 1–4.8)
LYMPHOCYTES NFR BLD AUTO: 25.5 % (ref 27–41)
MCH RBC QN AUTO: 30.5 PG (ref 27–31)
MCHC RBC AUTO-ENTMCNC: 34.9 G/DL (ref 32–36)
MCV RBC AUTO: 87.4 FL (ref 80–96)
MONOCYTES # BLD AUTO: 0.41 K/UL (ref 0–0.8)
MONOCYTES NFR BLD AUTO: 8 % (ref 2–6)
MPC BLD CALC-MCNC: 11 FL (ref 9.4–12.4)
NEUTROPHILS # BLD AUTO: 3.3 K/UL (ref 1.8–7.7)
NEUTROPHILS NFR BLD AUTO: 64.1 % (ref 53–65)
NRBC # BLD AUTO: 0 X10E3/UL
NRBC, AUTO (.00): 0 %
PLATELET # BLD AUTO: 222 K/UL (ref 150–400)
POTASSIUM SERPL-SCNC: 5 MMOL/L (ref 3.5–5.1)
RBC # BLD AUTO: 4.52 M/UL (ref 4.6–6.2)
SODIUM SERPL-SCNC: 139 MMOL/L (ref 136–145)
WBC # BLD AUTO: 5.14 K/UL (ref 4.5–11)

## 2024-06-18 PROCEDURE — 85025 COMPLETE CBC W/AUTO DIFF WBC: CPT | Performed by: NURSE PRACTITIONER

## 2024-06-18 PROCEDURE — 85651 RBC SED RATE NONAUTOMATED: CPT | Performed by: NURSE PRACTITIONER

## 2024-06-18 PROCEDURE — 80048 BASIC METABOLIC PNL TOTAL CA: CPT | Performed by: NURSE PRACTITIONER

## 2024-06-18 PROCEDURE — 82550 ASSAY OF CK (CPK): CPT | Performed by: NURSE PRACTITIONER

## 2024-06-18 PROCEDURE — 86140 C-REACTIVE PROTEIN: CPT | Performed by: NURSE PRACTITIONER

## 2024-06-19 LAB — CRP SERPL-MCNC: 0.77 MG/DL (ref 0–0.8)

## 2024-06-24 ENCOUNTER — LAB REQUISITION (OUTPATIENT)
Dept: LAB | Facility: HOSPITAL | Age: 44
End: 2024-06-24
Attending: NURSE PRACTITIONER
Payer: MEDICARE

## 2024-06-24 DIAGNOSIS — L97.512 NON-PRESSURE CHRONIC ULCER OF OTHER PART OF RIGHT FOOT WITH FAT LAYER EXPOSED: ICD-10-CM

## 2024-06-24 LAB
ANION GAP SERPL CALCULATED.3IONS-SCNC: 14 MMOL/L (ref 7–16)
BASOPHILS # BLD AUTO: 0.07 K/UL (ref 0–0.2)
BASOPHILS NFR BLD AUTO: 1.1 % (ref 0–1)
BUN SERPL-MCNC: 11 MG/DL (ref 7–18)
BUN/CREAT SERPL: 9 (ref 6–20)
CALCIUM SERPL-MCNC: 8.6 MG/DL (ref 8.5–10.1)
CHLORIDE SERPL-SCNC: 101 MMOL/L (ref 98–107)
CK SERPL-CCNC: 117 U/L (ref 39–308)
CO2 SERPL-SCNC: 25 MMOL/L (ref 21–32)
CREAT SERPL-MCNC: 1.17 MG/DL (ref 0.7–1.3)
DIFFERENTIAL METHOD BLD: ABNORMAL
EGFR (NO RACE VARIABLE) (RUSH/TITUS): 79 ML/MIN/1.73M2
EOSINOPHIL # BLD AUTO: 0.06 K/UL (ref 0–0.5)
EOSINOPHIL NFR BLD AUTO: 1 % (ref 1–4)
ERYTHROCYTE [DISTWIDTH] IN BLOOD BY AUTOMATED COUNT: 12.6 % (ref 11.5–14.5)
ERYTHROCYTE [SEDIMENTATION RATE] IN BLOOD BY WESTERGREN METHOD: 19 MM/HR (ref 0–15)
GLUCOSE SERPL-MCNC: 361 MG/DL (ref 74–106)
HCT VFR BLD AUTO: 39.3 % (ref 40–54)
HGB BLD-MCNC: 14 G/DL (ref 13.5–18)
IMM GRANULOCYTES # BLD AUTO: 0.05 K/UL (ref 0–0.04)
IMM GRANULOCYTES NFR BLD: 0.8 % (ref 0–0.4)
LYMPHOCYTES # BLD AUTO: 2.74 K/UL (ref 1–4.8)
LYMPHOCYTES NFR BLD AUTO: 43.4 % (ref 27–41)
MCH RBC QN AUTO: 30.4 PG (ref 27–31)
MCHC RBC AUTO-ENTMCNC: 35.6 G/DL (ref 32–36)
MCV RBC AUTO: 85.4 FL (ref 80–96)
MONOCYTES # BLD AUTO: 0.44 K/UL (ref 0–0.8)
MONOCYTES NFR BLD AUTO: 7 % (ref 2–6)
MPC BLD CALC-MCNC: 10.8 FL (ref 9.4–12.4)
NEUTROPHILS # BLD AUTO: 2.95 K/UL (ref 1.8–7.7)
NEUTROPHILS NFR BLD AUTO: 46.7 % (ref 53–65)
NRBC # BLD AUTO: 0 X10E3/UL
NRBC, AUTO (.00): 0 %
PLATELET # BLD AUTO: 264 K/UL (ref 150–400)
POTASSIUM SERPL-SCNC: 4.4 MMOL/L (ref 3.5–5.1)
RBC # BLD AUTO: 4.6 M/UL (ref 4.6–6.2)
SODIUM SERPL-SCNC: 136 MMOL/L (ref 136–145)
WBC # BLD AUTO: 6.31 K/UL (ref 4.5–11)

## 2024-06-24 PROCEDURE — 82550 ASSAY OF CK (CPK): CPT | Performed by: NURSE PRACTITIONER

## 2024-06-24 PROCEDURE — 86140 C-REACTIVE PROTEIN: CPT | Performed by: NURSE PRACTITIONER

## 2024-06-24 PROCEDURE — 85025 COMPLETE CBC W/AUTO DIFF WBC: CPT | Performed by: NURSE PRACTITIONER

## 2024-06-24 PROCEDURE — 80048 BASIC METABOLIC PNL TOTAL CA: CPT | Performed by: NURSE PRACTITIONER

## 2024-06-24 PROCEDURE — 85651 RBC SED RATE NONAUTOMATED: CPT | Performed by: NURSE PRACTITIONER

## 2024-06-24 PROCEDURE — 83036 HEMOGLOBIN GLYCOSYLATED A1C: CPT | Performed by: NURSE PRACTITIONER

## 2024-06-25 LAB
CRP SERPL-MCNC: 0.29 MG/DL (ref 0–0.8)
EST. AVERAGE GLUCOSE BLD GHB EST-MCNC: 309 MG/DL
HBA1C MFR BLD HPLC: 12.4 % (ref 4.5–6.6)

## 2024-07-01 ENCOUNTER — LAB REQUISITION (OUTPATIENT)
Dept: LAB | Facility: HOSPITAL | Age: 44
End: 2024-07-01
Attending: NURSE PRACTITIONER
Payer: MEDICARE

## 2024-07-01 DIAGNOSIS — L97.512 NON-PRESSURE CHRONIC ULCER OF OTHER PART OF RIGHT FOOT WITH FAT LAYER EXPOSED: ICD-10-CM

## 2024-07-01 LAB
ANION GAP SERPL CALCULATED.3IONS-SCNC: 11 MMOL/L (ref 7–16)
BASOPHILS # BLD AUTO: 0.05 K/UL (ref 0–0.2)
BASOPHILS NFR BLD AUTO: 0.9 % (ref 0–1)
BUN SERPL-MCNC: 11 MG/DL (ref 7–18)
BUN/CREAT SERPL: 11 (ref 6–20)
CALCIUM SERPL-MCNC: 9.2 MG/DL (ref 8.5–10.1)
CHLORIDE SERPL-SCNC: 104 MMOL/L (ref 98–107)
CK SERPL-CCNC: 125 U/L (ref 39–308)
CO2 SERPL-SCNC: 28 MMOL/L (ref 21–32)
CREAT SERPL-MCNC: 1.04 MG/DL (ref 0.7–1.3)
CRP SERPL-MCNC: <0.29 MG/DL (ref 0–0.8)
DIFFERENTIAL METHOD BLD: ABNORMAL
EGFR (NO RACE VARIABLE) (RUSH/TITUS): 91 ML/MIN/1.73M2
EOSINOPHIL # BLD AUTO: 0.04 K/UL (ref 0–0.5)
EOSINOPHIL NFR BLD AUTO: 0.7 % (ref 1–4)
ERYTHROCYTE [DISTWIDTH] IN BLOOD BY AUTOMATED COUNT: 12.8 % (ref 11.5–14.5)
ERYTHROCYTE [SEDIMENTATION RATE] IN BLOOD BY WESTERGREN METHOD: 16 MM/HR (ref 0–15)
GLUCOSE SERPL-MCNC: 315 MG/DL (ref 74–106)
HCT VFR BLD AUTO: 39.8 % (ref 40–54)
HGB BLD-MCNC: 14.3 G/DL (ref 13.5–18)
IMM GRANULOCYTES # BLD AUTO: 0.04 K/UL (ref 0–0.04)
IMM GRANULOCYTES NFR BLD: 0.7 % (ref 0–0.4)
LYMPHOCYTES # BLD AUTO: 1.85 K/UL (ref 1–4.8)
LYMPHOCYTES NFR BLD AUTO: 33.4 % (ref 27–41)
MCH RBC QN AUTO: 30.6 PG (ref 27–31)
MCHC RBC AUTO-ENTMCNC: 35.9 G/DL (ref 32–36)
MCV RBC AUTO: 85.2 FL (ref 80–96)
MONOCYTES # BLD AUTO: 0.29 K/UL (ref 0–0.8)
MONOCYTES NFR BLD AUTO: 5.2 % (ref 2–6)
MPC BLD CALC-MCNC: 10.6 FL (ref 9.4–12.4)
NEUTROPHILS # BLD AUTO: 3.27 K/UL (ref 1.8–7.7)
NEUTROPHILS NFR BLD AUTO: 59.1 % (ref 53–65)
NRBC # BLD AUTO: 0 X10E3/UL
NRBC, AUTO (.00): 0 %
PLATELET # BLD AUTO: 274 K/UL (ref 150–400)
POTASSIUM SERPL-SCNC: 4.4 MMOL/L (ref 3.5–5.1)
RBC # BLD AUTO: 4.67 M/UL (ref 4.6–6.2)
SODIUM SERPL-SCNC: 139 MMOL/L (ref 136–145)
WBC # BLD AUTO: 5.54 K/UL (ref 4.5–11)

## 2024-07-01 PROCEDURE — 80048 BASIC METABOLIC PNL TOTAL CA: CPT | Performed by: NURSE PRACTITIONER

## 2024-07-01 PROCEDURE — 86140 C-REACTIVE PROTEIN: CPT | Performed by: NURSE PRACTITIONER

## 2024-07-01 PROCEDURE — 85651 RBC SED RATE NONAUTOMATED: CPT | Performed by: NURSE PRACTITIONER

## 2024-07-01 PROCEDURE — 85025 COMPLETE CBC W/AUTO DIFF WBC: CPT | Performed by: NURSE PRACTITIONER

## 2024-07-01 PROCEDURE — 82550 ASSAY OF CK (CPK): CPT | Performed by: NURSE PRACTITIONER

## 2024-07-09 ENCOUNTER — LAB REQUISITION (OUTPATIENT)
Dept: LAB | Facility: HOSPITAL | Age: 44
End: 2024-07-09
Attending: NURSE PRACTITIONER
Payer: MEDICARE

## 2024-07-09 DIAGNOSIS — L97.512 NON-PRESSURE CHRONIC ULCER OF OTHER PART OF RIGHT FOOT WITH FAT LAYER EXPOSED: ICD-10-CM

## 2024-07-09 LAB
ANION GAP SERPL CALCULATED.3IONS-SCNC: 12 MMOL/L (ref 7–16)
BASOPHILS # BLD AUTO: 0.04 K/UL (ref 0–0.2)
BASOPHILS NFR BLD AUTO: 0.9 % (ref 0–1)
BUN SERPL-MCNC: 13 MG/DL (ref 7–18)
BUN/CREAT SERPL: 12 (ref 6–20)
CALCIUM SERPL-MCNC: 9.2 MG/DL (ref 8.5–10.1)
CHLORIDE SERPL-SCNC: 106 MMOL/L (ref 98–107)
CK SERPL-CCNC: 114 U/L (ref 39–308)
CO2 SERPL-SCNC: 28 MMOL/L (ref 21–32)
CREAT SERPL-MCNC: 1.11 MG/DL (ref 0.7–1.3)
CRP SERPL-MCNC: <0.29 MG/DL (ref 0–0.8)
DIFFERENTIAL METHOD BLD: ABNORMAL
EGFR (NO RACE VARIABLE) (RUSH/TITUS): 84 ML/MIN/1.73M2
EOSINOPHIL # BLD AUTO: 0.05 K/UL (ref 0–0.5)
EOSINOPHIL NFR BLD AUTO: 1.1 % (ref 1–4)
ERYTHROCYTE [DISTWIDTH] IN BLOOD BY AUTOMATED COUNT: 13.1 % (ref 11.5–14.5)
ERYTHROCYTE [SEDIMENTATION RATE] IN BLOOD BY WESTERGREN METHOD: 8 MM/HR (ref 0–15)
EST. AVERAGE GLUCOSE BLD GHB EST-MCNC: 309 MG/DL
GLUCOSE SERPL-MCNC: 395 MG/DL (ref 74–106)
HBA1C MFR BLD HPLC: 12.4 % (ref 4.5–6.6)
HCT VFR BLD AUTO: 38.2 % (ref 40–54)
HGB BLD-MCNC: 13.5 G/DL (ref 13.5–18)
IMM GRANULOCYTES # BLD AUTO: 0.04 K/UL (ref 0–0.04)
IMM GRANULOCYTES NFR BLD: 0.9 % (ref 0–0.4)
LYMPHOCYTES # BLD AUTO: 1.45 K/UL (ref 1–4.8)
LYMPHOCYTES NFR BLD AUTO: 33.3 % (ref 27–41)
MCH RBC QN AUTO: 30.5 PG (ref 27–31)
MCHC RBC AUTO-ENTMCNC: 35.3 G/DL (ref 32–36)
MCV RBC AUTO: 86.4 FL (ref 80–96)
MONOCYTES # BLD AUTO: 0.25 K/UL (ref 0–0.8)
MONOCYTES NFR BLD AUTO: 5.7 % (ref 2–6)
MPC BLD CALC-MCNC: 10.8 FL (ref 9.4–12.4)
NEUTROPHILS # BLD AUTO: 2.53 K/UL (ref 1.8–7.7)
NEUTROPHILS NFR BLD AUTO: 58.1 % (ref 53–65)
NRBC # BLD AUTO: 0 X10E3/UL
NRBC, AUTO (.00): 0 %
PLATELET # BLD AUTO: 248 K/UL (ref 150–400)
POTASSIUM SERPL-SCNC: 3.9 MMOL/L (ref 3.5–5.1)
RBC # BLD AUTO: 4.42 M/UL (ref 4.6–6.2)
SODIUM SERPL-SCNC: 142 MMOL/L (ref 136–145)
WBC # BLD AUTO: 4.36 K/UL (ref 4.5–11)

## 2024-07-09 PROCEDURE — 85651 RBC SED RATE NONAUTOMATED: CPT | Performed by: NURSE PRACTITIONER

## 2024-07-09 PROCEDURE — 80048 BASIC METABOLIC PNL TOTAL CA: CPT | Performed by: NURSE PRACTITIONER

## 2024-07-09 PROCEDURE — 83036 HEMOGLOBIN GLYCOSYLATED A1C: CPT | Performed by: NURSE PRACTITIONER

## 2024-07-09 PROCEDURE — 85025 COMPLETE CBC W/AUTO DIFF WBC: CPT | Performed by: NURSE PRACTITIONER

## 2024-07-09 PROCEDURE — 82550 ASSAY OF CK (CPK): CPT | Performed by: NURSE PRACTITIONER

## 2024-07-09 PROCEDURE — 86140 C-REACTIVE PROTEIN: CPT | Performed by: NURSE PRACTITIONER

## 2024-07-24 ENCOUNTER — LAB REQUISITION (OUTPATIENT)
Dept: LAB | Facility: HOSPITAL | Age: 44
End: 2024-07-24
Attending: NURSE PRACTITIONER
Payer: MEDICARE

## 2024-07-24 DIAGNOSIS — M86.171 OTHER ACUTE OSTEOMYELITIS, RIGHT ANKLE AND FOOT: ICD-10-CM

## 2024-07-24 DIAGNOSIS — E11.621 TYPE 2 DIABETES MELLITUS WITH FOOT ULCER (CODE): ICD-10-CM

## 2024-07-24 DIAGNOSIS — Z79.2 LONG TERM (CURRENT) USE OF ANTIBIOTICS: ICD-10-CM

## 2024-07-24 LAB
ANION GAP SERPL CALCULATED.3IONS-SCNC: 15 MMOL/L (ref 7–16)
BASOPHILS # BLD AUTO: 0.05 K/UL (ref 0–0.2)
BASOPHILS NFR BLD AUTO: 0.5 % (ref 0–1)
BUN SERPL-MCNC: 9 MG/DL (ref 7–18)
BUN/CREAT SERPL: 8 (ref 6–20)
CALCIUM SERPL-MCNC: 9.3 MG/DL (ref 8.5–10.1)
CHLORIDE SERPL-SCNC: 104 MMOL/L (ref 98–107)
CK SERPL-CCNC: 80 U/L (ref 39–308)
CO2 SERPL-SCNC: 25 MMOL/L (ref 21–32)
CREAT SERPL-MCNC: 1.15 MG/DL (ref 0.7–1.3)
DIFFERENTIAL METHOD BLD: ABNORMAL
EGFR (NO RACE VARIABLE) (RUSH/TITUS): 81 ML/MIN/1.73M2
EOSINOPHIL # BLD AUTO: 0.02 K/UL (ref 0–0.5)
EOSINOPHIL NFR BLD AUTO: 0.2 % (ref 1–4)
ERYTHROCYTE [DISTWIDTH] IN BLOOD BY AUTOMATED COUNT: 13.1 % (ref 11.5–14.5)
ERYTHROCYTE [SEDIMENTATION RATE] IN BLOOD BY WESTERGREN METHOD: 34 MM/HR (ref 0–15)
GLUCOSE SERPL-MCNC: 444 MG/DL (ref 74–106)
HCT VFR BLD AUTO: 37.7 % (ref 40–54)
HGB BLD-MCNC: 13.7 G/DL (ref 13.5–18)
IMM GRANULOCYTES # BLD AUTO: 0.05 K/UL (ref 0–0.04)
IMM GRANULOCYTES NFR BLD: 0.5 % (ref 0–0.4)
LYMPHOCYTES # BLD AUTO: 1.67 K/UL (ref 1–4.8)
LYMPHOCYTES NFR BLD AUTO: 15.4 % (ref 27–41)
MCH RBC QN AUTO: 31.1 PG (ref 27–31)
MCHC RBC AUTO-ENTMCNC: 36.3 G/DL (ref 32–36)
MCV RBC AUTO: 85.7 FL (ref 80–96)
MONOCYTES # BLD AUTO: 0.68 K/UL (ref 0–0.8)
MONOCYTES NFR BLD AUTO: 6.3 % (ref 2–6)
MPC BLD CALC-MCNC: 11.2 FL (ref 9.4–12.4)
NEUTROPHILS # BLD AUTO: 8.37 K/UL (ref 1.8–7.7)
NEUTROPHILS NFR BLD AUTO: 77.1 % (ref 53–65)
NRBC # BLD AUTO: 0 X10E3/UL
NRBC, AUTO (.00): 0 %
PLATELET # BLD AUTO: 224 K/UL (ref 150–400)
POTASSIUM SERPL-SCNC: 4.2 MMOL/L (ref 3.5–5.1)
RBC # BLD AUTO: 4.4 M/UL (ref 4.6–6.2)
SODIUM SERPL-SCNC: 140 MMOL/L (ref 136–145)
WBC # BLD AUTO: 10.84 K/UL (ref 4.5–11)

## 2024-07-24 PROCEDURE — 85651 RBC SED RATE NONAUTOMATED: CPT | Performed by: NURSE PRACTITIONER

## 2024-07-24 PROCEDURE — 86140 C-REACTIVE PROTEIN: CPT | Performed by: NURSE PRACTITIONER

## 2024-07-24 PROCEDURE — 85025 COMPLETE CBC W/AUTO DIFF WBC: CPT | Performed by: NURSE PRACTITIONER

## 2024-07-24 PROCEDURE — 82550 ASSAY OF CK (CPK): CPT | Performed by: NURSE PRACTITIONER

## 2024-07-24 PROCEDURE — 80048 BASIC METABOLIC PNL TOTAL CA: CPT | Performed by: NURSE PRACTITIONER

## 2024-07-25 LAB — CRP SERPL-MCNC: 7.11 MG/DL (ref 0–0.8)

## 2024-08-05 ENCOUNTER — HOSPITAL ENCOUNTER (INPATIENT)
Facility: HOSPITAL | Age: 44
LOS: 3 days | Discharge: HOME | DRG: 194 | End: 2024-08-09
Attending: EMERGENCY MEDICINE | Admitting: EMERGENCY MEDICINE
Payer: MEDICARE

## 2024-08-05 DIAGNOSIS — J18.9 COMMUNITY ACQUIRED PNEUMONIA OF RIGHT UPPER LOBE OF LUNG: Primary | ICD-10-CM

## 2024-08-05 DIAGNOSIS — R07.9 CHEST PAIN: ICD-10-CM

## 2024-08-05 DIAGNOSIS — I48.91 ATRIAL FIBRILLATION WITH RVR: ICD-10-CM

## 2024-08-05 DIAGNOSIS — N39.0 BACTERIAL URINARY INFECTION: ICD-10-CM

## 2024-08-05 DIAGNOSIS — A49.9 BACTERIAL URINARY INFECTION: ICD-10-CM

## 2024-08-05 DIAGNOSIS — E11.65 UNCONTROLLED TYPE 2 DIABETES MELLITUS WITH HYPERGLYCEMIA: ICD-10-CM

## 2024-08-05 LAB
ACETONE SERPL QL SCN: NEGATIVE
ALBUMIN SERPL BCP-MCNC: 2.7 G/DL (ref 3.5–5)
ALBUMIN/GLOB SERPL: 0.5 {RATIO}
ALP SERPL-CCNC: 71 U/L (ref 45–115)
ALT SERPL W P-5'-P-CCNC: 14 U/L (ref 16–61)
AMPHET UR QL SCN: NEGATIVE
ANION GAP SERPL CALCULATED.3IONS-SCNC: 13 MMOL/L (ref 7–16)
AST SERPL W P-5'-P-CCNC: 13 U/L (ref 15–37)
BACTERIA #/AREA URNS HPF: ABNORMAL /HPF
BARBITURATES UR QL SCN: NEGATIVE
BASOPHILS # BLD AUTO: 0.05 K/UL (ref 0–0.2)
BASOPHILS NFR BLD AUTO: 0.6 % (ref 0–1)
BENZODIAZ METAB UR QL SCN: NEGATIVE
BILIRUB SERPL-MCNC: 0.8 MG/DL (ref ?–1.2)
BILIRUB UR QL STRIP: NEGATIVE
BUN SERPL-MCNC: 14 MG/DL (ref 7–18)
BUN/CREAT SERPL: 9 (ref 6–20)
CALCIUM SERPL-MCNC: 9.2 MG/DL (ref 8.5–10.1)
CANNABINOIDS UR QL SCN: NEGATIVE
CHLORIDE SERPL-SCNC: 97 MMOL/L (ref 98–107)
CLARITY UR: ABNORMAL
CO2 SERPL-SCNC: 28 MMOL/L (ref 21–32)
COCAINE UR QL SCN: NEGATIVE
COLOR UR: ABNORMAL
CREAT SERPL-MCNC: 1.51 MG/DL (ref 0.7–1.3)
D DIMER PPP FEU-MCNC: 0.97 ΜG/ML (ref 0–0.47)
DIFFERENTIAL METHOD BLD: ABNORMAL
EGFR (NO RACE VARIABLE) (RUSH/TITUS): 58 ML/MIN/1.73M2
EOSINOPHIL # BLD AUTO: 0.07 K/UL (ref 0–0.5)
EOSINOPHIL NFR BLD AUTO: 0.8 % (ref 1–4)
ERYTHROCYTE [DISTWIDTH] IN BLOOD BY AUTOMATED COUNT: 11.9 % (ref 11.5–14.5)
GLOBULIN SER-MCNC: 5.3 G/DL (ref 2–4)
GLUCOSE SERPL-MCNC: 285 MG/DL (ref 70–105)
GLUCOSE SERPL-MCNC: 424 MG/DL (ref 74–106)
GLUCOSE UR STRIP-MCNC: >=1000 MG/DL
HCT VFR BLD AUTO: 36.6 % (ref 40–54)
HGB BLD-MCNC: 13 G/DL (ref 13.5–18)
IMM GRANULOCYTES # BLD AUTO: 0.1 K/UL (ref 0–0.04)
IMM GRANULOCYTES NFR BLD: 1.1 % (ref 0–0.4)
KETONES UR STRIP-SCNC: NEGATIVE MG/DL
LEUKOCYTE ESTERASE UR QL STRIP: NEGATIVE
LYMPHOCYTES # BLD AUTO: 1.81 K/UL (ref 1–4.8)
LYMPHOCYTES NFR BLD AUTO: 20.5 % (ref 27–41)
MCH RBC QN AUTO: 30.2 PG (ref 27–31)
MCHC RBC AUTO-ENTMCNC: 35.5 G/DL (ref 32–36)
MCV RBC AUTO: 84.9 FL (ref 80–96)
MONOCYTES # BLD AUTO: 0.87 K/UL (ref 0–0.8)
MONOCYTES NFR BLD AUTO: 9.9 % (ref 2–6)
MPC BLD CALC-MCNC: 10 FL (ref 9.4–12.4)
NEUTROPHILS # BLD AUTO: 5.93 K/UL (ref 1.8–7.7)
NEUTROPHILS NFR BLD AUTO: 67.1 % (ref 53–65)
NITRITE UR QL STRIP: NEGATIVE
NRBC # BLD AUTO: 0 X10E3/UL
NRBC, AUTO (.00): 0 %
NT-PROBNP SERPL-MCNC: 191 PG/ML (ref 1–125)
OPIATES UR QL SCN: NEGATIVE
PCP UR QL SCN: NEGATIVE
PH UR STRIP: 5.5 PH UNITS
PLATELET # BLD AUTO: 360 K/UL (ref 150–400)
POTASSIUM SERPL-SCNC: 3.9 MMOL/L (ref 3.5–5.1)
PROT SERPL-MCNC: 8 G/DL (ref 6.4–8.2)
PROT UR QL STRIP: ABNORMAL
RBC # BLD AUTO: 4.31 M/UL (ref 4.6–6.2)
RBC # UR STRIP: ABNORMAL /UL
RBC #/AREA URNS HPF: ABNORMAL /HPF
RENAL EPI CELLS #/AREA URNS LPF: ABNORMAL /LPF
SODIUM SERPL-SCNC: 134 MMOL/L (ref 136–145)
SP GR UR STRIP: <=1.005
SQUAMOUS #/AREA URNS LPF: ABNORMAL /LPF
TROPONIN I SERPL DL<=0.01 NG/ML-MCNC: <4 PG/ML
UROBILINOGEN UR STRIP-ACNC: 0.2 MG/DL
WBC # BLD AUTO: 8.83 K/UL (ref 4.5–11)
WBC #/AREA URNS HPF: ABNORMAL /HPF
WBC CLUMPS, UA: ABNORMAL /HPF

## 2024-08-05 PROCEDURE — 96375 TX/PRO/DX INJ NEW DRUG ADDON: CPT

## 2024-08-05 PROCEDURE — 93005 ELECTROCARDIOGRAM TRACING: CPT

## 2024-08-05 PROCEDURE — 81001 URINALYSIS AUTO W/SCOPE: CPT | Mod: XB | Performed by: EMERGENCY MEDICINE

## 2024-08-05 PROCEDURE — 87591 N.GONORRHOEAE DNA AMP PROB: CPT | Performed by: EMERGENCY MEDICINE

## 2024-08-05 PROCEDURE — 83880 ASSAY OF NATRIURETIC PEPTIDE: CPT | Performed by: EMERGENCY MEDICINE

## 2024-08-05 PROCEDURE — 99285 EMERGENCY DEPT VISIT HI MDM: CPT | Mod: 25

## 2024-08-05 PROCEDURE — 85379 FIBRIN DEGRADATION QUANT: CPT | Performed by: EMERGENCY MEDICINE

## 2024-08-05 PROCEDURE — 80053 COMPREHEN METABOLIC PANEL: CPT | Performed by: EMERGENCY MEDICINE

## 2024-08-05 PROCEDURE — 83605 ASSAY OF LACTIC ACID: CPT | Performed by: EMERGENCY MEDICINE

## 2024-08-05 PROCEDURE — 96376 TX/PRO/DX INJ SAME DRUG ADON: CPT

## 2024-08-05 PROCEDURE — 85730 THROMBOPLASTIN TIME PARTIAL: CPT | Performed by: EMERGENCY MEDICINE

## 2024-08-05 PROCEDURE — 82962 GLUCOSE BLOOD TEST: CPT

## 2024-08-05 PROCEDURE — 96361 HYDRATE IV INFUSION ADD-ON: CPT

## 2024-08-05 PROCEDURE — 87186 SC STD MICRODIL/AGAR DIL: CPT | Performed by: EMERGENCY MEDICINE

## 2024-08-05 PROCEDURE — 87491 CHLMYD TRACH DNA AMP PROBE: CPT | Performed by: EMERGENCY MEDICINE

## 2024-08-05 PROCEDURE — 81003 URINALYSIS AUTO W/O SCOPE: CPT | Mod: 59 | Performed by: EMERGENCY MEDICINE

## 2024-08-05 PROCEDURE — 84484 ASSAY OF TROPONIN QUANT: CPT | Performed by: EMERGENCY MEDICINE

## 2024-08-05 PROCEDURE — 87086 URINE CULTURE/COLONY COUNT: CPT | Performed by: EMERGENCY MEDICINE

## 2024-08-05 PROCEDURE — 63600175 PHARM REV CODE 636 W HCPCS: Performed by: EMERGENCY MEDICINE

## 2024-08-05 PROCEDURE — 85025 COMPLETE CBC W/AUTO DIFF WBC: CPT | Performed by: EMERGENCY MEDICINE

## 2024-08-05 PROCEDURE — 36415 COLL VENOUS BLD VENIPUNCTURE: CPT | Performed by: EMERGENCY MEDICINE

## 2024-08-05 PROCEDURE — 25000003 PHARM REV CODE 250: Performed by: EMERGENCY MEDICINE

## 2024-08-05 PROCEDURE — 99285 EMERGENCY DEPT VISIT HI MDM: CPT | Mod: ,,, | Performed by: EMERGENCY MEDICINE

## 2024-08-05 PROCEDURE — 80307 DRUG TEST PRSMV CHEM ANLYZR: CPT | Performed by: EMERGENCY MEDICINE

## 2024-08-05 PROCEDURE — 85610 PROTHROMBIN TIME: CPT | Performed by: EMERGENCY MEDICINE

## 2024-08-05 PROCEDURE — 82009 KETONE BODYS QUAL: CPT | Performed by: EMERGENCY MEDICINE

## 2024-08-05 RX ORDER — METOPROLOL SUCCINATE 25 MG/1
25 TABLET, EXTENDED RELEASE ORAL DAILY
COMMUNITY
Start: 2024-03-22

## 2024-08-05 RX ORDER — AMIODARONE HYDROCHLORIDE 400 MG/1
400 TABLET ORAL 2 TIMES DAILY
Status: ON HOLD | COMMUNITY
End: 2024-08-08 | Stop reason: DRUGHIGH

## 2024-08-05 RX ORDER — DILTIAZEM HYDROCHLORIDE 5 MG/ML
20 INJECTION INTRAVENOUS
Status: COMPLETED | OUTPATIENT
Start: 2024-08-05 | End: 2024-08-05

## 2024-08-05 RX ORDER — TIRZEPATIDE 7.5 MG/.5ML
7.5 INJECTION, SOLUTION SUBCUTANEOUS WEEKLY
COMMUNITY
Start: 2024-07-25

## 2024-08-05 RX ORDER — SODIUM CHLORIDE 9 MG/ML
1000 INJECTION, SOLUTION INTRAVENOUS
Status: COMPLETED | OUTPATIENT
Start: 2024-08-06 | End: 2024-08-06

## 2024-08-05 RX ORDER — INSULIN ASPART 100 [IU]/ML
6 INJECTION, SOLUTION INTRAVENOUS; SUBCUTANEOUS
COMMUNITY
Start: 2024-07-25

## 2024-08-05 RX ORDER — HYDROCODONE BITARTRATE AND ACETAMINOPHEN 7.5; 325 MG/1; MG/1
1 TABLET ORAL EVERY 6 HOURS PRN
COMMUNITY
Start: 2024-07-03

## 2024-08-05 RX ADMIN — HUMAN INSULIN 10 UNITS: 100 INJECTION, SOLUTION SUBCUTANEOUS at 11:08

## 2024-08-05 RX ADMIN — SODIUM CHLORIDE 1000 ML: 9 INJECTION, SOLUTION INTRAVENOUS at 11:08

## 2024-08-05 RX ADMIN — DILTIAZEM HYDROCHLORIDE 20 MG: 5 INJECTION INTRAVENOUS at 11:08

## 2024-08-05 RX ADMIN — SODIUM CHLORIDE 500 ML: 9 INJECTION, SOLUTION INTRAVENOUS at 11:08

## 2024-08-06 PROBLEM — A49.9 BACTERIAL URINARY INFECTION: Status: ACTIVE | Noted: 2023-01-04

## 2024-08-06 PROBLEM — J18.9 COMMUNITY ACQUIRED PNEUMONIA OF RIGHT UPPER LOBE OF LUNG: Status: ACTIVE | Noted: 2024-08-06

## 2024-08-06 PROBLEM — I48.91 ATRIAL FIBRILLATION WITH RVR: Status: ACTIVE | Noted: 2024-08-06

## 2024-08-06 LAB
APTT PPP: 36.5 SECONDS (ref 25.2–37.3)
GLUCOSE SERPL-MCNC: 155 MG/DL (ref 70–105)
GLUCOSE SERPL-MCNC: 185 MG/DL (ref 70–105)
GLUCOSE SERPL-MCNC: 200 MG/DL (ref 70–105)
GLUCOSE SERPL-MCNC: 229 MG/DL (ref 70–105)
GLUCOSE SERPL-MCNC: 292 MG/DL (ref 70–105)
GLUCOSE SERPL-MCNC: 447 MG/DL (ref 70–105)
INR BLD: 1.21
LACTATE SERPL-SCNC: 1.8 MMOL/L (ref 0.4–2)
PROTHROMBIN TIME: 15.2 SECONDS (ref 11.7–14.7)

## 2024-08-06 PROCEDURE — G0378 HOSPITAL OBSERVATION PER HR: HCPCS

## 2024-08-06 PROCEDURE — 63600175 PHARM REV CODE 636 W HCPCS: Performed by: EMERGENCY MEDICINE

## 2024-08-06 PROCEDURE — 25500020 PHARM REV CODE 255: Performed by: EMERGENCY MEDICINE

## 2024-08-06 PROCEDURE — 82962 GLUCOSE BLOOD TEST: CPT

## 2024-08-06 PROCEDURE — 96376 TX/PRO/DX INJ SAME DRUG ADON: CPT

## 2024-08-06 PROCEDURE — 96367 TX/PROPH/DG ADDL SEQ IV INF: CPT

## 2024-08-06 PROCEDURE — 99223 1ST HOSP IP/OBS HIGH 75: CPT | Performed by: EMERGENCY MEDICINE

## 2024-08-06 PROCEDURE — 99900035 HC TECH TIME PER 15 MIN (STAT)

## 2024-08-06 PROCEDURE — 94761 N-INVAS EAR/PLS OXIMETRY MLT: CPT

## 2024-08-06 PROCEDURE — 96361 HYDRATE IV INFUSION ADD-ON: CPT

## 2024-08-06 PROCEDURE — 96365 THER/PROPH/DIAG IV INF INIT: CPT

## 2024-08-06 PROCEDURE — 25000003 PHARM REV CODE 250: Performed by: EMERGENCY MEDICINE

## 2024-08-06 PROCEDURE — 36415 COLL VENOUS BLD VENIPUNCTURE: CPT | Performed by: EMERGENCY MEDICINE

## 2024-08-06 PROCEDURE — 11000001 HC ACUTE MED/SURG PRIVATE ROOM

## 2024-08-06 PROCEDURE — 87040 BLOOD CULTURE FOR BACTERIA: CPT | Performed by: EMERGENCY MEDICINE

## 2024-08-06 PROCEDURE — 27000221 HC OXYGEN, UP TO 24 HOURS

## 2024-08-06 PROCEDURE — 96372 THER/PROPH/DIAG INJ SC/IM: CPT | Performed by: EMERGENCY MEDICINE

## 2024-08-06 RX ORDER — SODIUM CHLORIDE 9 MG/ML
INJECTION, SOLUTION INTRAVENOUS CONTINUOUS
Status: DISPENSED | OUTPATIENT
Start: 2024-08-06 | End: 2024-08-07

## 2024-08-06 RX ORDER — FAMOTIDINE 20 MG/1
20 TABLET, FILM COATED ORAL 2 TIMES DAILY
Status: DISCONTINUED | OUTPATIENT
Start: 2024-08-06 | End: 2024-08-09 | Stop reason: HOSPADM

## 2024-08-06 RX ORDER — IBUPROFEN 200 MG
24 TABLET ORAL
Status: DISCONTINUED | OUTPATIENT
Start: 2024-08-06 | End: 2024-08-08

## 2024-08-06 RX ORDER — METOPROLOL SUCCINATE 25 MG/1
25 TABLET, EXTENDED RELEASE ORAL DAILY
Status: DISCONTINUED | OUTPATIENT
Start: 2024-08-06 | End: 2024-08-09 | Stop reason: HOSPADM

## 2024-08-06 RX ORDER — DEXTROMETHORPHAN POLISTIREX 30 MG/5 ML
1 SUSPENSION, EXTENDED RELEASE 12 HR ORAL DAILY PRN
Status: DISCONTINUED | OUTPATIENT
Start: 2024-08-06 | End: 2024-08-09 | Stop reason: HOSPADM

## 2024-08-06 RX ORDER — HYDROCODONE BITARTRATE AND ACETAMINOPHEN 5; 325 MG/1; MG/1
1 TABLET ORAL EVERY 6 HOURS PRN
Status: DISCONTINUED | OUTPATIENT
Start: 2024-08-06 | End: 2024-08-09 | Stop reason: HOSPADM

## 2024-08-06 RX ORDER — POLYETHYLENE GLYCOL 3350 17 G/17G
17 POWDER, FOR SOLUTION ORAL DAILY
Status: DISCONTINUED | OUTPATIENT
Start: 2024-08-06 | End: 2024-08-09 | Stop reason: HOSPADM

## 2024-08-06 RX ORDER — ACETAMINOPHEN 325 MG/1
650 TABLET ORAL EVERY 8 HOURS PRN
Status: DISCONTINUED | OUTPATIENT
Start: 2024-08-06 | End: 2024-08-09 | Stop reason: HOSPADM

## 2024-08-06 RX ORDER — DOXYCYCLINE HYCLATE 100 MG
100 TABLET ORAL EVERY 12 HOURS
Status: DISCONTINUED | OUTPATIENT
Start: 2024-08-06 | End: 2024-08-09 | Stop reason: HOSPADM

## 2024-08-06 RX ORDER — AMIODARONE HYDROCHLORIDE 200 MG/1
400 TABLET ORAL 2 TIMES DAILY
Status: DISCONTINUED | OUTPATIENT
Start: 2024-08-06 | End: 2024-08-09 | Stop reason: HOSPADM

## 2024-08-06 RX ORDER — INSULIN ASPART 100 [IU]/ML
0.08 INJECTION, SOLUTION INTRAVENOUS; SUBCUTANEOUS
Status: DISCONTINUED | OUTPATIENT
Start: 2024-08-06 | End: 2024-08-08

## 2024-08-06 RX ORDER — INSULIN ASPART 100 [IU]/ML
0-10 INJECTION, SOLUTION INTRAVENOUS; SUBCUTANEOUS
Status: DISCONTINUED | OUTPATIENT
Start: 2024-08-06 | End: 2024-08-08

## 2024-08-06 RX ORDER — IOPAMIDOL 755 MG/ML
100 INJECTION, SOLUTION INTRAVASCULAR
Status: COMPLETED | OUTPATIENT
Start: 2024-08-06 | End: 2024-08-06

## 2024-08-06 RX ORDER — GLUCAGON 1 MG
1 KIT INJECTION
Status: DISCONTINUED | OUTPATIENT
Start: 2024-08-06 | End: 2024-08-08

## 2024-08-06 RX ORDER — IBUPROFEN 200 MG
16 TABLET ORAL
Status: DISCONTINUED | OUTPATIENT
Start: 2024-08-06 | End: 2024-08-08

## 2024-08-06 RX ORDER — SODIUM CHLORIDE 0.9 % (FLUSH) 0.9 %
10 SYRINGE (ML) INJECTION
Status: DISCONTINUED | OUTPATIENT
Start: 2024-08-06 | End: 2024-08-09 | Stop reason: HOSPADM

## 2024-08-06 RX ORDER — PROCHLORPERAZINE EDISYLATE 5 MG/ML
5 INJECTION INTRAMUSCULAR; INTRAVENOUS EVERY 6 HOURS PRN
Status: DISCONTINUED | OUTPATIENT
Start: 2024-08-06 | End: 2024-08-09 | Stop reason: HOSPADM

## 2024-08-06 RX ORDER — ASPIRIN 81 MG/1
81 TABLET ORAL DAILY
Status: DISCONTINUED | OUTPATIENT
Start: 2024-08-06 | End: 2024-08-09 | Stop reason: HOSPADM

## 2024-08-06 RX ORDER — INSULIN GLARGINE 100 [IU]/ML
0.25 INJECTION, SOLUTION SUBCUTANEOUS NIGHTLY
Status: DISCONTINUED | OUTPATIENT
Start: 2024-08-06 | End: 2024-08-08

## 2024-08-06 RX ORDER — AMOXICILLIN 250 MG
1 CAPSULE ORAL 2 TIMES DAILY
Status: DISCONTINUED | OUTPATIENT
Start: 2024-08-06 | End: 2024-08-09 | Stop reason: HOSPADM

## 2024-08-06 RX ORDER — TALC
6 POWDER (GRAM) TOPICAL NIGHTLY PRN
Status: DISCONTINUED | OUTPATIENT
Start: 2024-08-06 | End: 2024-08-09 | Stop reason: HOSPADM

## 2024-08-06 RX ADMIN — INSULIN ASPART 8 UNITS: 100 INJECTION, SOLUTION INTRAVENOUS; SUBCUTANEOUS at 05:08

## 2024-08-06 RX ADMIN — POLYETHYLENE GLYCOL 3350 17 G: 17 POWDER, FOR SOLUTION ORAL at 09:08

## 2024-08-06 RX ADMIN — SENNOSIDES AND DOCUSATE SODIUM 1 TABLET: 50; 8.6 TABLET ORAL at 08:08

## 2024-08-06 RX ADMIN — INSULIN ASPART 8 UNITS: 100 INJECTION, SOLUTION INTRAVENOUS; SUBCUTANEOUS at 06:08

## 2024-08-06 RX ADMIN — AMIODARONE HYDROCHLORIDE 400 MG: 200 TABLET ORAL at 09:08

## 2024-08-06 RX ADMIN — HYDROCODONE BITARTRATE AND ACETAMINOPHEN 1 TABLET: 5; 325 TABLET ORAL at 06:08

## 2024-08-06 RX ADMIN — INSULIN ASPART 8 UNITS: 100 INJECTION, SOLUTION INTRAVENOUS; SUBCUTANEOUS at 11:08

## 2024-08-06 RX ADMIN — HUMAN INSULIN 5 UNITS: 100 INJECTION, SOLUTION SUBCUTANEOUS at 12:08

## 2024-08-06 RX ADMIN — DEXTROSE MONOHYDRATE 1 G: 5 INJECTION INTRAVENOUS at 12:08

## 2024-08-06 RX ADMIN — SODIUM CHLORIDE: 900 INJECTION, SOLUTION INTRAVENOUS at 05:08

## 2024-08-06 RX ADMIN — DOXYCYCLINE HYCLATE 100 MG: 100 TABLET, COATED ORAL at 08:08

## 2024-08-06 RX ADMIN — IOPAMIDOL 90 ML: 755 INJECTION, SOLUTION INTRAVENOUS at 12:08

## 2024-08-06 RX ADMIN — METOPROLOL SUCCINATE 25 MG: 25 TABLET, EXTENDED RELEASE ORAL at 09:08

## 2024-08-06 RX ADMIN — GABAPENTIN 400 MG: 300 CAPSULE ORAL at 09:08

## 2024-08-06 RX ADMIN — DEXTROSE MONOHYDRATE 1 G: 5 INJECTION INTRAVENOUS at 11:08

## 2024-08-06 RX ADMIN — GABAPENTIN 400 MG: 300 CAPSULE ORAL at 04:08

## 2024-08-06 RX ADMIN — AMIODARONE HYDROCHLORIDE 400 MG: 200 TABLET ORAL at 08:08

## 2024-08-06 RX ADMIN — INSULIN ASPART 2 UNITS: 100 INJECTION, SOLUTION INTRAVENOUS; SUBCUTANEOUS at 11:08

## 2024-08-06 RX ADMIN — APIXABAN 5 MG: 5 TABLET, FILM COATED ORAL at 08:08

## 2024-08-06 RX ADMIN — FAMOTIDINE 20 MG: 20 TABLET, FILM COATED ORAL at 08:08

## 2024-08-06 RX ADMIN — DOXYCYCLINE HYCLATE 100 MG: 100 TABLET, COATED ORAL at 09:08

## 2024-08-06 RX ADMIN — INSULIN ASPART 6 UNITS: 100 INJECTION, SOLUTION INTRAVENOUS; SUBCUTANEOUS at 06:08

## 2024-08-06 RX ADMIN — INSULIN ASPART 2 UNITS: 100 INJECTION, SOLUTION INTRAVENOUS; SUBCUTANEOUS at 05:08

## 2024-08-06 RX ADMIN — INSULIN ASPART 2 UNITS: 100 INJECTION, SOLUTION INTRAVENOUS; SUBCUTANEOUS at 08:08

## 2024-08-06 RX ADMIN — DEXTROSE MONOHYDRATE 2.5 MG/HR: 50 INJECTION, SOLUTION INTRAVENOUS at 12:08

## 2024-08-06 RX ADMIN — ASPIRIN 81 MG: 81 TABLET, COATED ORAL at 09:08

## 2024-08-06 RX ADMIN — GABAPENTIN 400 MG: 300 CAPSULE ORAL at 08:08

## 2024-08-06 RX ADMIN — HYDROCODONE BITARTRATE AND ACETAMINOPHEN 1 TABLET: 5; 325 TABLET ORAL at 05:08

## 2024-08-06 RX ADMIN — FAMOTIDINE 20 MG: 20 TABLET, FILM COATED ORAL at 09:08

## 2024-08-06 RX ADMIN — SENNOSIDES AND DOCUSATE SODIUM 1 TABLET: 50; 8.6 TABLET ORAL at 09:08

## 2024-08-06 RX ADMIN — SODIUM CHLORIDE: 900 INJECTION, SOLUTION INTRAVENOUS at 08:08

## 2024-08-06 RX ADMIN — INSULIN GLARGINE 25 UNITS: 100 INJECTION, SOLUTION SUBCUTANEOUS at 08:08

## 2024-08-06 RX ADMIN — APIXABAN 5 MG: 5 TABLET, FILM COATED ORAL at 09:08

## 2024-08-06 NOTE — ED PROVIDER NOTES
"Encounter Date: 8/5/2024       History     Chief Complaint   Patient presents with    Chest Pain     Patient presents with "chest discomfort", shortness of breath, and sensation that his heart is beating fast, for the past 2 days.  Has a history of atrial fibrillation.  Takes Eliquis and amiodarone.  States that he has been taking all of his medication as instructed.  Sees a cardiologist at [facility] in [place].      Review of patient's allergies indicates:   Allergen Reactions    Ace inhibitors Anaphylaxis and Swelling    Lisinopril Swelling     Past Medical History:   Diagnosis Date    A-fib     CHF (congestive heart failure)     Diabetes mellitus     Hypertension      Past Surgical History:   Procedure Laterality Date    CARDIOVERSION       Family History   Problem Relation Name Age of Onset    Diabetes Mother      Heart disease Mother      Diabetes Father      Heart disease Father       Social History     Tobacco Use    Smoking status: Former    Smokeless tobacco: Never   Substance Use Topics    Alcohol use: Yes     Comment: OCCASIONALLY     Drug use: Yes     Types: Marijuana     Review of Systems   Constitutional:  Positive for fatigue. Negative for fever.   HENT: Negative.     Eyes: Negative.    Respiratory:  Positive for shortness of breath. Negative for apnea, cough, choking, chest tightness, wheezing and stridor.    Cardiovascular:  Positive for chest pain and palpitations. Negative for leg swelling.   Gastrointestinal: Negative.  Negative for nausea and vomiting.   Genitourinary: Negative.    Musculoskeletal: Negative.    Skin: Negative.    Neurological: Negative.    Hematological: Negative.    Psychiatric/Behavioral: Negative.     All other systems reviewed and are negative.      Physical Exam     Initial Vitals [08/05/24 2241]   BP Pulse Resp Temp SpO2   125/83 (!) 137 20 99.3 °F (37.4 °C) 98 %      MAP       --         Physical Exam    Nursing note and vitals " reviewed.  Constitutional: He appears well-developed and well-nourished. No distress.   HENT:   Right Ear: External ear normal.   Left Ear: External ear normal.   Nose: Nose normal.   Mouth/Throat: Oropharynx is clear and moist. No oropharyngeal exudate.   Eyes: Conjunctivae and EOM are normal. Pupils are equal, round, and reactive to light.   Neck: Neck supple. No JVD present.   Normal range of motion.  Cardiovascular:  Normal heart sounds and intact distal pulses. An irregularly irregular rhythm present.   Tachycardia present.         No murmur heard.  Pulmonary/Chest: Breath sounds normal. No stridor. No respiratory distress. He has no wheezes. He has no rhonchi. He has no rales.   Abdominal: Abdomen is soft. Bowel sounds are normal. He exhibits no distension. There is no abdominal tenderness.   Musculoskeletal:         General: No tenderness or edema. Normal range of motion.      Cervical back: Normal range of motion and neck supple.      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  Skin is intact.  Patient appears to have some black discoloration of 2nd and 3rd toes distally consistent with chronic dry gangrene.     Lymphadenopathy:     He has no cervical adenopathy.   Neurological: He is alert and oriented to person, place, and time. He has normal strength. No cranial nerve deficit. GCS score is 15. GCS eye subscore is 4. GCS verbal subscore is 5. GCS motor subscore is 6.   Skin: Skin is warm and dry. Capillary refill takes less than 2 seconds. No rash noted. No erythema. No pallor.   Psychiatric: He has a normal mood and affect. His behavior is normal.         Medical Screening Exam   See Full Note    ED Course   Procedures  Labs Reviewed   COMPREHENSIVE METABOLIC PANEL - Abnormal       Result Value    Sodium 134 (*)     Potassium 3.9      Chloride 97 (*)     CO2 28      Anion Gap 13      Glucose 424 (*)     BUN 14      Creatinine 1.51 (*)     BUN/Creatinine Ratio 9       Calcium 9.2      Total Protein 8.0      Albumin 2.7 (*)     Globulin 5.3 (*)     A/G Ratio 0.5      Bilirubin, Total 0.8      Alk Phos 71      ALT 14 (*)     AST 13 (*)     eGFR 58 (*)    D DIMER, QUANTITATIVE - Abnormal    D-Dimer 0.97 (*)    URINALYSIS, REFLEX TO URINE CULTURE - Abnormal    Color, UA Light Yellow      Clarity, UA Cloudy      pH, UA 5.5      Leukocytes, UA Negative      Nitrites, UA Negative      Protein, UA Trace (*)     Glucose, UA >=1000 (*)     Ketones, UA Negative      Urobilinogen, UA 0.2      Bilirubin, UA Negative      Blood, UA Trace-Intact (*)     Specific Gravity, UA <=1.005     NT-PRO NATRIURETIC PEPTIDE - Abnormal    ProBNP 191 (*)    CBC WITH DIFFERENTIAL - Abnormal    WBC 8.83      RBC 4.31 (*)     Hemoglobin 13.0 (*)     Hematocrit 36.6 (*)     MCV 84.9      MCH 30.2      MCHC 35.5      RDW 11.9      Platelet Count 360      MPV 10.0      Neutrophils % 67.1 (*)     Lymphocytes % 20.5 (*)     Monocytes % 9.9 (*)     Eosinophils % 0.8 (*)     Basophils % 0.6      Immature Granulocytes % 1.1 (*)     nRBC, Auto 0.0      Neutrophils, Abs 5.93      Lymphocytes, Absolute 1.81      Monocytes, Absolute 0.87 (*)     Eosinophils, Absolute 0.07      Basophils, Absolute 0.05      Immature Granulocytes, Absolute 0.10 (*)     nRBC, Absolute 0.00      Diff Type Auto     URINALYSIS, MICROSCOPIC - Abnormal    WBC, UA 15-25 (*)     RBC, UA 0-3      Bacteria, UA Many (*)     Squamous Epithelial Cells, UA Rare      Renal Epithelial Cells, UA Rare (*)     WBC Clumps Few (*)    PROTIME-INR - Abnormal    PT 15.2 (*)     INR 1.21     POCT GLUCOSE MONITORING CONTINUOUS - Abnormal    POC Glucose 285 (*)    POCT GLUCOSE MONITORING CONTINUOUS - Abnormal    POC Glucose 200 (*)    DRUG SCREEN, URINE (BEAKER) - Normal    Barbiturates, Urine Negative      Benzodiazepine, Urine Negative      Opiates, Urine Negative      Phencyclidine, Urine Negative      Amphetamine, Urine Negative      Cannabinoid, Urine Negative       Cocaine, Urine Negative      Narrative:     The results of screening tests should be considered presumptive. Confirmatory testing is available upon request.    Cutoff Points:  PCP:         25ng/mL  AMPH:        500ng/mL  JOHNATHAN:        200ng/mL  LYNN:        200ng/mL  THC:         50ng/mL  ARIAN:         300ng/mL  OPI:         2000ng/mL   TROPONIN I - Normal    Troponin I High Sensitivity <4.0     APTT - Normal    PTT 36.5     LACTIC ACID, PLASMA - Normal    Lactic Acid 1.8     CULTURE, URINE   CHLAMYDIA/GONORRHOEAE(GC), PCR   CULTURE, BLOOD   CULTURE, BLOOD   CBC W/ AUTO DIFFERENTIAL    Narrative:     The following orders were created for panel order CBC auto differential.  Procedure                               Abnormality         Status                     ---------                               -----------         ------                     CBC with Differential[[id number]]       Abnormal            Final result                 Please view results for these tests on the individual orders.   ACETONE    Acetone Negative     POCT GLUCOSE MONITORING CONTINUOUS   POCT GLUCOSE MONITORING CONTINUOUS          Imaging Results              CTA Chest Non-Coronary (PE Studies) (In process)                      X-Ray Chest PA And Lateral (In process)                      Medications   sodium chloride 0.9% bolus 500 mL 500 mL ( Intravenous Stopped 8/5/24 2348)   diltiaZEM injection 20 mg (20 mg Intravenous Given 8/5/24 2304)   insulin regular injection 10 Units 0.1 mL (10 Units Intravenous Given 8/5/24 2312)   cefTRIAXone (Rocephin) 1 g in D5W 100 mL IVPB (MB+) (0 g Intravenous Stopped 8/6/24 0047)   0.9%  NaCl infusion ( Intravenous Stopped 8/6/24 0159)   insulin regular injection 5 Units 0.05 mL (5 Units Intravenous Given 8/6/24 0028)   iopamidoL (ISOVUE-370) injection 100 mL (90 mLs Intravenous Given 8/6/24 0019)     Medical Decision Making  Differential diagnosis includes atrial fibrillation with RVR, acute coronary  syndrome, pulmonary embolism, chest wall pain, uncontrolled type 2 diabetes, volume depletion, electrolyte abnormality.    Patient had an initial fingerstick blood sugar over 400.  Patient was treated with IV insulin and repeat glucose ordered.  Patient was given an IV fluid bolus 500 mL normal saline and then started on continuous IV saline infusion at 75 mL/hour.  Patient was given diltiazem 20 mg IV bolus for atrial fibrillation with RVR with rates 135-140.  Patient was started on IV diltiazem infusion at 5 mg an hour, titrating dose to control heart rate to less than 110.  Heart rate came down to 105-108.  Patient was found to have urinary infection and was given Rocephin 1 g IV.    Repeat glucose was 285 mg/dL, patient was given an additional 5 units of insulin IV.  Repeat glucose ordered.    Diltiazem infusion discontinued due to borderline low blood pressure.  Heart rate continues to be relatively well controlled between 101 110.  CTA chest shows no pulmonary embolism but does indicate evidence for upper lobe pneumonia.  Urine is consistent with urinary infection.  Patient has an drop in his O2 sat while sleeping, placed on 2 L nasal cannula oxygen.  Recommend admit observation for IV antibiotics and IV fluids.  Recommend transfer to higher level of care if rate is unable to be controlled or blood pressure is low.    Amount and/or Complexity of Data Reviewed  Labs: ordered. Decision-making details documented in ED Course.  Radiology: ordered. Decision-making details documented in ED Course.    Risk  OTC drugs.  Prescription drug management.               ED Course as of 08/06/24 0429   Mon Aug 05, 2024   2330 Drug Screen, Urine  Urine drug screen is negative [LM]   2331 Urinalysis, Reflex to Urine Culture(!)  Urinalysis shows light yellow cloudy urine with trace proteinuria and greater than 1000 mg/dL glucosuria, negative for ketones. [LM]   2331 CBC auto differential(!)  CBC shows white blood cell count  8.83, hemoglobin 13.0 with a hematocrit 36.6, normal platelet count 360. [LM]   2335 Urinalysis, Microscopic(!)  Microscopic urinalysis shows 15-25 WBCs, 0-3 RBCs with many bacteria as well as a few WBC clumps and rare renal epithelial cells. [LM]   2338 NT-Pro Natriuretic Peptide(!)  BNP is slightly elevated at 191 [LM]   2338 Comprehensive metabolic panel(!)  CMP shows sodium 134 with chloride 97, glucose is 424, BUN is 14 with creatinine 1.51, estimated GFR 58.  Albumin is low at 2.7. [LM]   2348 Troponin I  Troponin is less than 4.0 [LM]   2348 Acetone, serum  Serum acetone is negative [LM]   2349 D dimer, quantitative(!)  D-dimer is slightly elevated at 0.97 [LM]   2349 X-Ray Chest PA And Lateral  PA and lateral chest x-ray indicates normal heart size and clear lung fields, large amount of gas in the transverse and descending colon noted. [LM]   Tue Aug 06, 2024   0014 POCT glucose(!)  Repeat glucose was 285 mg/dL. [LM]   0304 Protime-INR(!)  PT is 15.2 with INR 1.21 [LM]   0304 APTT  PTT is 36.5 [LM]   0304 Lactic acid, plasma  Lactic acid level is normal at 1.8 [LM]   0304 POCT glucose(!)  Repeat glucose is 200 mg/dL [LM]   0304 CTA Chest Non-Coronary (PE Studies)  Review of radiologist's report for CTA chest PE study indicates no evidence of pulmonary embolism.  No aortic aneurysm or dissection.  There is mild airspace opacity in the dependent portion of the right upper lobe that could represent atypical infection, also scattered trace opacities of the lingula and bilateral lower lobes which could represent atelectasis atypical infection or pulmonary edema.  Recommend follow up CT chest in 3 months to ensure resolution.  Nonspecific thickening of the wall of the esophagus also noted. [LM]      ED Course User Index  [LM] Hay Gaston DO                             Clinical Impression:   Final diagnoses:  [R07.9] Chest pain  [J18.9] Community acquired pneumonia of right upper lobe of lung  (Primary)  [I48.91] Atrial fibrillation with RVR  [E11.65] Uncontrolled type 2 diabetes mellitus with hyperglycemia  [N39.0, A49.9] Bacterial urinary infection        ED Disposition Condition    Observation Hay Young DO  08/06/24 0423

## 2024-08-06 NOTE — H&P
[facility] - Emergency Department    History & Physical      Patient Name: Merline Ibarra  MRN: [id number]  Admission Date: 8/5/2024  Attending Physician: Hay Gaston DO   Primary Care Provider: Sophie Chen FNP         Patient information was obtained from patient, past medical records, and ER records.     Subjective:     Principal Problem:Community acquired pneumonia of right upper lobe of lung    Chief Complaint:   Chief Complaint   Patient presents with    Chest Pain        HPI:  Patient presented to the emergency department with 2 day history of chest pain and shortness of breath with a history of atrial fibrillation.  He was found to have markedly elevated glucose level, rapid ventricular rate, right upper lobe pneumonia, and urinary infection.  Observation admission recommended for IV antibiotic therapy and monitoring.    Past Medical History:   Diagnosis Date    A-fib     CHF (congestive heart failure)     Diabetes mellitus     Hypertension        Past Surgical History:   Procedure Laterality Date    CARDIOVERSION         Review of patient's allergies indicates:   Allergen Reactions    Ace inhibitors Anaphylaxis and Swelling    Lisinopril Swelling       No current facility-administered medications on file prior to encounter.     Current Outpatient Medications on File Prior to Encounter   Medication Sig    HYDROcodone-acetaminophen (NORCO) 7.5-325 mg per tablet Take 1 tablet by mouth every 6 (six) hours as needed for Pain.    metoprolol succinate (TOPROL-XL) 25 MG 24 hr tablet Take 25 mg by mouth once daily.    MOUNJARO 7.5 mg/0.5 mL PnIj Inject 7.5 mg into the skin once a week.    NOVOLOG FLEXPEN U-100 INSULIN 100 unit/mL (3 mL) InPn pen Inject 6 Units into the skin as needed.    amiodarone (PACERONE) 400 MG tablet Take 400 mg by mouth 2 (two) times daily.    aspirin (ECOTRIN) 81 MG EC tablet Take 81 mg by mouth once daily.    ELIQUIS 5 mg Tab Take 1 tablet (5 mg total) by mouth 2  (two) times daily.    gabapentin (NEURONTIN) 400 MG capsule Take 400 mg by mouth 3 (three) times daily.    LANTUS SOLOSTAR U-100 INSULIN glargine 100 units/mL SubQ pen Inject 10 Units into the skin nightly.    liraglutide 0.6 mg/0.1 mL, 18 mg/3 mL, subq PNIJ (VICTOZA 3-SOFI) 0.6 mg/0.1 mL (18 mg/3 mL) PnIj pen Inject 0.6 mg into the skin once daily.    metFORMIN (GLUCOPHAGE) 500 MG tablet Take 1 tablet (500 mg total) by mouth 2 (two) times daily with meals.    [DISCONTINUED] insulin NPH-insulin regular, 70/30, (NOVOLIN 70/30) 100 unit/mL (70-30) injection Inject into the skin 2 (two) times daily. 20 units in morning, 10 units at night     Family History       Problem Relation (Age of Onset)    Diabetes Mother, Father    Heart disease Mother, Father          Tobacco Use    Smoking status: Former    Smokeless tobacco: Never   Substance and Sexual Activity    Alcohol use: Yes     Comment: OCCASIONALLY     Drug use: Yes     Types: Marijuana    Sexual activity: Yes     Partners: Female     Review of Systems   Constitutional:  Positive for fatigue. Negative for activity change, appetite change, chills, diaphoresis and fever.   HENT: Negative.     Eyes: Negative.    Respiratory:  Positive for shortness of breath. Negative for apnea, cough, choking, chest tightness, wheezing and stridor.    Cardiovascular:  Positive for chest pain and palpitations. Negative for leg swelling.   Gastrointestinal: Negative.    Genitourinary: Negative.    Musculoskeletal: Negative.    Skin: Negative.  Negative for wound.   Neurological: Negative.    Psychiatric/Behavioral: Negative.     All other systems reviewed and are negative.    Objective:     Vital Signs (Most Recent):  Temp: 99.3 °F (37.4 °C) (08/05/24 2241)  Pulse: 101 (08/06/24 0400)  Resp: 20 (08/06/24 0400)  BP: 106/87 (08/06/24 0400)  SpO2: 98 % (08/06/24 0400) Vital Signs (24h Range):  Temp:  [99.3 °F (37.4 °C)] 99.3 °F (37.4 °C)  Pulse:  [] 101  Resp:  [20] 20  SpO2:  [90  %-98 %] 98 %  BP: ()/(58-87) 106/87     Weight: 98.9 kg (218 lb)  Body mass index is 29.57 kg/m².    Physical Exam  Vitals and nursing note reviewed.   Constitutional:       General: He is not in acute distress.     Appearance: Normal appearance. He is not ill-appearing or toxic-appearing.   HENT:      Head: Normocephalic.      Right Ear: External ear normal.      Left Ear: External ear normal.      Nose: Nose normal. No congestion or rhinorrhea.      Mouth/Throat:      Mouth: Mucous membranes are moist.      Pharynx: Oropharynx is clear. No oropharyngeal exudate or posterior oropharyngeal erythema.   Eyes:      General: No scleral icterus.        Right eye: No discharge.         Left eye: No discharge.      Extraocular Movements: Extraocular movements intact.      Conjunctiva/sclera: Conjunctivae normal.      Pupils: Pupils are equal, round, and reactive to light.   Cardiovascular:      Rate and Rhythm: Tachycardia present. Rhythm irregular.      Pulses: Normal pulses.      Heart sounds: Normal heart sounds. No murmur heard.  Pulmonary:      Effort: Pulmonary effort is normal. No respiratory distress.      Breath sounds: Normal breath sounds. No stridor. No wheezing, rhonchi or rales.   Abdominal:      General: Abdomen is flat. There is no distension.      Palpations: Abdomen is soft.      Tenderness: There is no abdominal tenderness.   Musculoskeletal:         General: No swelling or tenderness. Normal range of motion.      Cervical back: Normal range of motion and neck supple. No rigidity or tenderness.      Right lower leg: No edema.      Left lower leg: No edema.      Comments: Patient has amputation of the 4th and 5th toes on the right foot.  Skin is intact.  No open wound noted at this time on the extremities.   Lymphadenopathy:      Cervical: No cervical adenopathy.   Skin:     General: Skin is warm and dry.      Capillary Refill: Capillary refill takes 2 to 3 seconds.   Neurological:      General: No  focal deficit present.      Mental Status: He is alert and oriented to person, place, and time. Mental status is at baseline.      Cranial Nerves: No cranial nerve deficit.      Sensory: No sensory deficit.      Motor: No weakness.      Coordination: Coordination normal.      Gait: Gait normal.   Psychiatric:         Mood and Affect: Mood normal.         Behavior: Behavior normal.           CRANIAL NERVES     CN III, IV, VI   Pupils are equal, round, and reactive to light.      Significant Labs: All pertinent labs within the past 24 hours have been reviewed.  Recent Lab Results         08/06/24  0057   08/05/24  2347   08/05/24  2301   08/05/24  2259        Benzodiazepines     Negative         Cocaine     Negative         BARBITURATES     Negative         Albumin/Globulin Ratio       0.5       Acetone, Bld       Negative       Albumin       2.7       ALP       71       ALT       14       Amphetamine, Urine     Negative         Anion Gap       13       Appearance, UA     Cloudy         PTT       36.5       AST       13       Bacteria, UA     Many         Baso #       0.05       Basophil %       0.6       Bilirubin (UA)     Negative         BILIRUBIN TOTAL       0.8       BUN       14       BUN/CREAT RATIO       9       Calcium       9.2       Cannabinoid Scrn, Ur     Negative         Chloride       97       CO2       28       Color, UA     Light Yellow         Creatinine       1.51       D-Dimer       0.97       Differential Method       Auto       eGFR       58       Eos #       0.07       Eos %       0.8       Globulin, Total       5.3       Glucose       424       Glucose, UA     >=1000         Hematocrit       36.6       Hemoglobin       13.0       Immature Grans (Abs)       0.10       Immature Granulocytes       1.1       INR       1.21       Ketones, UA     Negative         Lactic Acid Level       1.8       Leukocyte Esterase, UA     Negative         Lymph #       1.81       Lymph %       20.5       MCH        30.2       MCHC       35.5       MCV       84.9       Mono #       0.87       Mono %       9.9       MPV       10.0       Neutrophils, Abs       5.93       Neutrophils Relative       67.1       NITRITE UA     Negative         nRBC       0.0       NT-proBNP       191       NUCLEATED RBC ABSOLUTE       0.00       Blood, UA     Trace-Intact         Opiates, Urine     Negative         pH, UA     5.5         Phencyclidine, Urine     Negative         Platelet Count       360       POC Glucose 200   285           Potassium       3.9       PROTEIN TOTAL       8.0       Protein, UA     Trace         PT       15.2       RBC       4.31       RBC, UA     0-3         RDW       11.9       Renal Epithel, UA     Rare         Sodium       134       Spec Grav UA     <=1.005         Squam Epithel, UA     Rare         Troponin I High Sensitivity       <4.0       UROBILINOGEN UA     0.2         WBC Clumps, UA     Few         WBC, UA     15-25         WBC       8.83               Significant Imaging: I have reviewed all pertinent imaging results/findings within the past 24 hours.  CT: I have reviewed all pertinent results/findings within the past 24 hours and my personal findings are:  CTA PE study showed infiltrate right upper lobe dependent portion.  Other findings noted and report.  CXR: I have reviewed all pertinent results/findings within the past 24 hours and my personal findings are:  Plain chest x-ray showed normal heart size and clear lung fields.    Assessment/Plan:     Active Diagnoses:    Diagnosis Date Noted POA    PRINCIPAL PROBLEM:  Community acquired pneumonia of right upper lobe of lung [J18.9] 08/06/2024 Yes    Atrial fibrillation with RVR [I48.91] 08/06/2024 Yes    Bacterial urinary infection [N39.0, A49.9] 01/04/2023 Yes    Uncontrolled type 2 diabetes mellitus with hyperglycemia [E11.65] 01/04/2023 Yes      Problems Resolved During this Admission:     VTE Risk Mitigation (From admission, onward)      None               Hay Gaston DO  Department of Hospital Medicine   [facility] - Emergency Department

## 2024-08-06 NOTE — ED NOTES
RIGHT FOOT WOUND: NO OPEN WOUND NOTED/4TH AND 5TH TOE AMPUTATION NOTED/HEALED WOUND/CURRENTLY BEING TREATED AT [facility] AND HYPERBARIC CHAMBER THERAPY/FOUL ODOR NOTED TO RIGHT FOOT

## 2024-08-06 NOTE — NURSING
Pt arrived on floor via wheelchair, assisted by staff. Pt is awake and alert, and has Nasal cannula tubing, which was connected in room at @2L; 2 Ivs present in seng forearms, and wound noted to right foot, skin is otherwise intact. Oriented to room and call bell, left safely in bed, CB in reach, bed down, SR upx2

## 2024-08-06 NOTE — ASSESSMENT & PLAN NOTE
Patient has a diagnosis of pneumonia. The cause of the pneumonia is suspected to be bacterial in etiology but organism is not known. The pneumonia is worsening due to mild hypoxia on room air . The patient has the following signs/symptoms of pneumonia: persistent hypoxia , shortness of breath, and chest pain. The patient does have a current oxygen requirement and the patient does not have a home oxygen requirement. I have reviewed the pertinent imaging. The following cultures have been collected: Blood cultures The culture results are listed below.     Current antimicrobial regimen consists of the antibiotics listed below. Will monitor patient closely and continue current treatment plan unchanged.    Antibiotics (From admission, onward)      None        Rocephin, Zithromax    Microbiology Results (last 7 days)       Procedure Component Value Units Date/Time    Blood culture #1 [[id number]]     Order Status: Sent Specimen: Blood     Blood culture #2 [[id number]]     Order Status: Sent Specimen: Blood     Chlamydia/GC, PCR [[id number]] Collected: 08/05/24 2301    Order Status: Sent Specimen: Urine Updated: 08/06/24 0011    Urine culture [[id number]] Collected: 08/05/24 2301    Order Status: Sent Specimen: Urine, Clean Catch Updated: 08/05/24 2334

## 2024-08-06 NOTE — PLAN OF CARE
Problem: Adult Inpatient Plan of Care  Goal: Plan of Care Review  Outcome: Progressing  Goal: Patient-Specific Goal (Individualized)  Outcome: Progressing  Goal: Absence of Hospital-Acquired Illness or Injury  Outcome: Progressing     Problem: Pneumonia  Goal: Fluid Balance  Outcome: Progressing

## 2024-08-06 NOTE — PLAN OF CARE
Problem: Adult Inpatient Plan of Care  Goal: Plan of Care Review  Outcome: Progressing  Goal: Patient-Specific Goal (Individualized)  Outcome: Progressing     Problem: Pneumonia  Goal: Fluid Balance  Outcome: Progressing  Intervention: Monitor and Manage Fluid Balance  Flowsheets (Taken 8/6/2024 0796)  Fluid/Electrolyte Management: fluids provided

## 2024-08-06 NOTE — ASSESSMENT & PLAN NOTE
Patient with Paroxysmal (<7 days) atrial fibrillation which is uncontrolled currently with Beta Blocker and Amiodarone. Patient is currently in atrial fibrillation.[id number] Score: 1. HASBLED Score: 1. Anticoagulation indicated. Anticoagulation done with Eliquis .    Patient has been on Eliquis 5 mg twice daily long-term, continue.

## 2024-08-06 NOTE — PLAN OF CARE
[facility] - Medical Surgical Unit  Initial Discharge Assessment       Primary Care Provider: Sophie Chen FNP    Admission Diagnosis: Chest pain [R07.9]  Atrial fibrillation with RVR [I48.91]  Bacterial urinary infection [N39.0, A49.9]  Uncontrolled type 2 diabetes mellitus with hyperglycemia [E11.65]  Community acquired pneumonia of right upper lobe of lung [J18.9]    Admission Date: 8/5/2024  Expected Discharge Date: 8/7/2024    Transition of Care Barriers: (P) None    Payor: [employer] MEDICARE / Plan: DP7 Digital HMO PPO SPECIAL NEEDS / Product Type: Medicare Advantage /     Extended Emergency Contact Information  Primary Emergency Contact: Yuko Mcrae  Mobile Phone: [phone]  Relation: Mother  Preferred language: English   needed? No    Discharge Plan A: (P) Home  Discharge Plan B: (P) Home      [facility] [place] [facility] [address]  [address] [facility] [address]  Phone: [phone] Fax: [phone]      Initial Assessment (most recent)       Adult Discharge Assessment - 08/06/24 0922          Discharge Assessment    Confirmed/corrected address, phone number and insurance Yes (P)      Confirmed Demographics Correct on Facesheet (P)      Source of Information patient (P)      Reason For Admission pneumonia (P)      People in Home alone (P)      Do you expect to return to your current living situation? Yes (P)      Do you have help at home or someone to help you manage your care at home? Yes (P)      Who are your caregiver(s) and their phone number(s)? Yuko Mcrae(mother) [phone] (P)      Prior to hospitilization cognitive status: Alert/Oriented (P)      Current cognitive status: Alert/Oriented (P)      Walking or Climbing Stairs Difficulty no (P)      Dressing/Bathing Difficulty no (P)      Home Accessibility not wheelchair accessible;stairs to enter home (P)      Number of Stairs, Main Entrance four (P)      Home Layout Able to live  on 1st floor (P)      Equipment Currently Used at Home none (P)      Readmission within 30 days? No (P)      Patient currently being followed by outpatient case management? No (P)      Do you currently have service(s) that help you manage your care at home? No (P)      Do you take prescription medications? Yes (P)      Do you have prescription coverage? Yes (P)      Coverage [employer] (P)      Do you have any problems affording any of your prescribed medications? No (P)      Is the patient taking medications as prescribed? yes (P)      Who is going to help you get home at discharge? Yuko Mcrae(mother) [phone] (P)      How do you get to doctors appointments? family or friend will provide;car, drives self (P)      Are you on dialysis? No (P)      Do you take coumadin? No (P)      Discharge Plan A Home (P)      Discharge Plan B Home (P)      DME Needed Upon Discharge  none (P)      Discharge Plan discussed with: Patient (P)      Transition of Care Barriers None (P)         Physical Activity    On average, how many days per week do you engage in moderate to strenuous exercise (like a brisk walk)? 0 days (P)      On average, how many minutes do you engage in exercise at this level? 0 min (P)         Financial Resource Strain    How hard is it for you to pay for the very basics like food, housing, medical care, and heating? Not hard at all (P)         Housing Stability    In the last 12 months, was there a time when you were not able to pay the mortgage or rent on time? No (P)      At any time in the past 12 months, were you homeless or living in a shelter (including now)? No (P)         Transportation Needs    Has the lack of transportation kept you from medical appointments, meetings, work or from getting things needed for daily living? No (P)         Food Insecurity    Within the past 12 months, you worried that your food would run out before you got the money to buy more. Never true (P)      Within the past 12  months, the food you bought just didn't last and you didn't have money to get more. Never true (P)         Stress    Do you feel stress - tense, restless, nervous, or anxious, or unable to sleep at night because your mind is troubled all the time - these days? Only a little (P)         Social Isolation    How often do you feel lonely or isolated from those around you?  Never (P)         Alcohol Use    Q1: How often do you have a drink containing alcohol? Never (P)      Q2: How many drinks containing alcohol do you have on a typical day when you are drinking? Patient does not drink (P)      Q3: How often do you have six or more drinks on one occasion? Never (P)         Utilities    In the past 12 months has the electric, gas, oil, or water company threatened to shut off services in your home? No (P)         Health Literacy    How often do you need to have someone help you when you read instructions, pamphlets, or other written material from your doctor or pharmacy? Never (P)                       Pt lives alone and is independent with all ADLs. No home health services or DMEs used at this time. CM will follow for any discharge needs.

## 2024-08-06 NOTE — ASSESSMENT & PLAN NOTE
"Patient's FSGs are uncontrolled due to hyperglycemia on current medication regimen.  Last A1c reviewed-   Lab Results   Component Value Date    HGBA1C 12.4 (H) 07/09/2024     Most recent fingerstick glucose reviewed- No results for input(s): "POCTGLUCOSE" in the last 24 hours.  Current correctional scale  Medium  Maintain anti-hyperglycemic dose as follows-   Antihyperglycemics (From admission, onward)      None        Humalog, Lantus, sliding scale  Hold Oral hypoglycemics while patient is in the hospital.  "

## 2024-08-06 NOTE — ED NOTES
BP 89/64    87/65  CARDIZEM STOPPED AT THIS TIME/ [id number] BOLUS AT THIS TIME/DR ROD AWARE    PT IS ASYMPTOMATIC

## 2024-08-07 LAB
ALBUMIN SERPL BCP-MCNC: 2.1 G/DL (ref 3.5–5)
ALBUMIN/GLOB SERPL: 0.5 {RATIO}
ALP SERPL-CCNC: 57 U/L (ref 45–115)
ALT SERPL W P-5'-P-CCNC: 11 U/L (ref 16–61)
ANION GAP SERPL CALCULATED.3IONS-SCNC: 10 MMOL/L (ref 7–16)
AST SERPL W P-5'-P-CCNC: 11 U/L (ref 15–37)
BASOPHILS # BLD AUTO: 0.05 K/UL (ref 0–0.2)
BASOPHILS NFR BLD AUTO: 0.5 % (ref 0–1)
BILIRUB SERPL-MCNC: 0.6 MG/DL (ref ?–1.2)
BUN SERPL-MCNC: 9 MG/DL (ref 7–18)
BUN/CREAT SERPL: 8 (ref 6–20)
CALCIUM SERPL-MCNC: 8.3 MG/DL (ref 8.5–10.1)
CHLAMYDIA BY PCR: NEGATIVE
CHLORIDE SERPL-SCNC: 103 MMOL/L (ref 98–107)
CO2 SERPL-SCNC: 27 MMOL/L (ref 21–32)
CREAT SERPL-MCNC: 1.12 MG/DL (ref 0.7–1.3)
DIFFERENTIAL METHOD BLD: ABNORMAL
EGFR (NO RACE VARIABLE) (RUSH/TITUS): 84 ML/MIN/1.73M2
EOSINOPHIL # BLD AUTO: 0.09 K/UL (ref 0–0.5)
EOSINOPHIL NFR BLD AUTO: 1 % (ref 1–4)
ERYTHROCYTE [DISTWIDTH] IN BLOOD BY AUTOMATED COUNT: 12.1 % (ref 11.5–14.5)
GLOBULIN SER-MCNC: 4.5 G/DL (ref 2–4)
GLUCOSE SERPL-MCNC: 210 MG/DL (ref 70–105)
GLUCOSE SERPL-MCNC: 215 MG/DL (ref 70–105)
GLUCOSE SERPL-MCNC: 227 MG/DL (ref 70–105)
GLUCOSE SERPL-MCNC: 256 MG/DL (ref 74–106)
GLUCOSE SERPL-MCNC: 284 MG/DL (ref 70–105)
GLUCOSE SERPL-MCNC: 374 MG/DL (ref 70–105)
HCT VFR BLD AUTO: 25.9 % (ref 40–54)
HGB BLD-MCNC: 9.4 G/DL (ref 13.5–18)
IMM GRANULOCYTES # BLD AUTO: 0.08 K/UL (ref 0–0.04)
IMM GRANULOCYTES NFR BLD: 0.8 % (ref 0–0.4)
LYMPHOCYTES # BLD AUTO: 1.53 K/UL (ref 1–4.8)
LYMPHOCYTES NFR BLD AUTO: 16.2 % (ref 27–41)
MCH RBC QN AUTO: 30.7 PG (ref 27–31)
MCHC RBC AUTO-ENTMCNC: 36.3 G/DL (ref 32–36)
MCV RBC AUTO: 84.6 FL (ref 80–96)
MONOCYTES # BLD AUTO: 0.91 K/UL (ref 0–0.8)
MONOCYTES NFR BLD AUTO: 9.7 % (ref 2–6)
MPC BLD CALC-MCNC: 9.5 FL (ref 9.4–12.4)
N. GONORRHOEAE (GC) BY PCR: NEGATIVE
NEUTROPHILS # BLD AUTO: 6.77 K/UL (ref 1.8–7.7)
NEUTROPHILS NFR BLD AUTO: 71.8 % (ref 53–65)
NRBC # BLD AUTO: 0 X10E3/UL
NRBC, AUTO (.00): 0 %
PLATELET # BLD AUTO: 320 K/UL (ref 150–400)
POTASSIUM SERPL-SCNC: 4 MMOL/L (ref 3.5–5.1)
PROT SERPL-MCNC: 6.6 G/DL (ref 6.4–8.2)
RBC # BLD AUTO: 3.06 M/UL (ref 4.6–6.2)
SODIUM SERPL-SCNC: 136 MMOL/L (ref 136–145)
WBC # BLD AUTO: 9.43 K/UL (ref 4.5–11)

## 2024-08-07 PROCEDURE — 85025 COMPLETE CBC W/AUTO DIFF WBC: CPT | Performed by: EMERGENCY MEDICINE

## 2024-08-07 PROCEDURE — 82962 GLUCOSE BLOOD TEST: CPT

## 2024-08-07 PROCEDURE — 63600175 PHARM REV CODE 636 W HCPCS: Performed by: EMERGENCY MEDICINE

## 2024-08-07 PROCEDURE — 36415 COLL VENOUS BLD VENIPUNCTURE: CPT | Performed by: EMERGENCY MEDICINE

## 2024-08-07 PROCEDURE — 94761 N-INVAS EAR/PLS OXIMETRY MLT: CPT

## 2024-08-07 PROCEDURE — 11000001 HC ACUTE MED/SURG PRIVATE ROOM

## 2024-08-07 PROCEDURE — 27000221 HC OXYGEN, UP TO 24 HOURS

## 2024-08-07 PROCEDURE — 80053 COMPREHEN METABOLIC PANEL: CPT | Performed by: EMERGENCY MEDICINE

## 2024-08-07 PROCEDURE — 25000003 PHARM REV CODE 250: Performed by: EMERGENCY MEDICINE

## 2024-08-07 RX ORDER — ACETAMINOPHEN 325 MG/1
650 TABLET ORAL EVERY 6 HOURS PRN
Status: DISCONTINUED | OUTPATIENT
Start: 2024-08-07 | End: 2024-08-09 | Stop reason: HOSPADM

## 2024-08-07 RX ADMIN — AMIODARONE HYDROCHLORIDE 400 MG: 200 TABLET ORAL at 08:08

## 2024-08-07 RX ADMIN — GABAPENTIN 400 MG: 300 CAPSULE ORAL at 03:08

## 2024-08-07 RX ADMIN — INSULIN ASPART 2 UNITS: 100 INJECTION, SOLUTION INTRAVENOUS; SUBCUTANEOUS at 08:08

## 2024-08-07 RX ADMIN — GABAPENTIN 400 MG: 300 CAPSULE ORAL at 08:08

## 2024-08-07 RX ADMIN — FAMOTIDINE 20 MG: 20 TABLET, FILM COATED ORAL at 08:08

## 2024-08-07 RX ADMIN — ASPIRIN 81 MG: 81 TABLET, COATED ORAL at 08:08

## 2024-08-07 RX ADMIN — APIXABAN 5 MG: 5 TABLET, FILM COATED ORAL at 08:08

## 2024-08-07 RX ADMIN — INSULIN ASPART 8 UNITS: 100 INJECTION, SOLUTION INTRAVENOUS; SUBCUTANEOUS at 11:08

## 2024-08-07 RX ADMIN — HYDROCODONE BITARTRATE AND ACETAMINOPHEN 1 TABLET: 5; 325 TABLET ORAL at 06:08

## 2024-08-07 RX ADMIN — DOXYCYCLINE HYCLATE 100 MG: 100 TABLET, COATED ORAL at 08:08

## 2024-08-07 RX ADMIN — INSULIN ASPART 8 UNITS: 100 INJECTION, SOLUTION INTRAVENOUS; SUBCUTANEOUS at 05:08

## 2024-08-07 RX ADMIN — INSULIN GLARGINE 25 UNITS: 100 INJECTION, SOLUTION SUBCUTANEOUS at 08:08

## 2024-08-07 RX ADMIN — HYDROCODONE BITARTRATE AND ACETAMINOPHEN 1 TABLET: 5; 325 TABLET ORAL at 03:08

## 2024-08-07 RX ADMIN — INSULIN ASPART 6 UNITS: 100 INJECTION, SOLUTION INTRAVENOUS; SUBCUTANEOUS at 06:08

## 2024-08-07 RX ADMIN — INSULIN ASPART 8 UNITS: 100 INJECTION, SOLUTION INTRAVENOUS; SUBCUTANEOUS at 06:08

## 2024-08-07 RX ADMIN — METOPROLOL SUCCINATE 25 MG: 25 TABLET, EXTENDED RELEASE ORAL at 08:08

## 2024-08-07 RX ADMIN — INSULIN ASPART 4 UNITS: 100 INJECTION, SOLUTION INTRAVENOUS; SUBCUTANEOUS at 05:08

## 2024-08-07 NOTE — PROGRESS NOTES
[facility] - Medical Surgical Unit  [facility] Medicine  Progress Note    Patient Name: Merline Ibarra  MRN: [id number]  Patient Class: IP- Inpatient   Admission Date: 8/5/2024  Length of Stay: 1 days  Attending Physician: Jessy Dee,*  Primary Care Provider: Sophie Chen FNP        Subjective:     Principal Problem:Community acquired pneumonia of right upper lobe of lung        HPI:  No notes on file    Overview/Hospital Course:  8/7/24 - pt. Is afebrile. Right lung with decreased breath sounds. Coarse rales. BS's in the 200 range. Will continue present treatment until cultures return.    Interval History: will continue present treatment.    Review of Systems  Objective:     Vital Signs (Most Recent):  Temp: 98.5 °F (36.9 °C) (08/07/24 0707)  Pulse: 95 (08/07/24 0714)  Resp: 18 (08/07/24 0714)  BP: 104/71 (08/07/24 0707)  SpO2: 99 % (08/07/24 0714) Vital Signs (24h Range):  Temp:  [98.3 °F (36.8 °C)-100.2 °F (37.9 °C)] 98.5 °F (36.9 °C)  Pulse:  [] 95  Resp:  [18-20] 18  SpO2:  [94 %-99 %] 99 %  BP: ()/(67-78) 104/71     Weight: 99.2 kg (218 lb 11.1 oz)  Body mass index is 29.66 kg/m².    Intake/Output Summary (Last 24 hours) at 8/7/2024 0824  Last data filed at 8/6/2024 2145  Gross per 24 hour   Intake 1624.41 ml   Output 1225 ml   Net 399.41 ml         Physical Exam        Significant Labs: All pertinent labs within the past 24 hours have been reviewed.    Significant Imaging: I have reviewed all pertinent imaging results/findings within the past 24 hours.    Assessment/Plan:      * Community acquired pneumonia of right upper lobe of lung  Patient has a diagnosis of pneumonia. The cause of the pneumonia is suspected to be bacterial in etiology but organism is not known. The pneumonia is worsening due to mild hypoxia on room air . The patient has the following signs/symptoms of pneumonia: persistent hypoxia , shortness of breath, and chest pain. The patient does have a  "current oxygen requirement and the patient does not have a home oxygen requirement. I have reviewed the pertinent imaging. The following cultures have been collected: Blood cultures The culture results are listed below.     Current antimicrobial regimen consists of the antibiotics listed below. Will monitor patient closely and continue current treatment plan unchanged.    Antibiotics (From admission, onward)      None        Rocephin, Zithromax    Microbiology Results (last 7 days)       Procedure Component Value Units Date/Time    Blood culture #1 [[id number]]     Order Status: Sent Specimen: Blood     Blood culture #2 [[id number]]     Order Status: Sent Specimen: Blood     Chlamydia/GC, PCR [[id number]] Collected: 08/05/24 2301    Order Status: Sent Specimen: Urine Updated: 08/06/24 0011    Urine culture [[id number]] Collected: 08/05/24 2301    Order Status: Sent Specimen: Urine, Clean Catch Updated: 08/05/24 2334            Atrial fibrillation with RVR  Patient with Paroxysmal (<7 days) atrial fibrillation which is uncontrolled currently with Beta Blocker and Amiodarone. Patient is currently in atrial fibrillation.[id number] Score: 1. HASBLED Score: 1. Anticoagulation indicated. Anticoagulation done with Eliquis .    Patient has been on Eliquis 5 mg twice daily long-term, continue.    Bacterial urinary infection  Patient treated with Rocephin, urine culture order, await results.      Uncontrolled type 2 diabetes mellitus with hyperglycemia  Patient's FSGs are uncontrolled due to hyperglycemia on current medication regimen.  Last A1c reviewed-   Lab Results   Component Value Date    HGBA1C 12.4 (H) 07/09/2024     Most recent fingerstick glucose reviewed- No results for input(s): "POCTGLUCOSE" in the last 24 hours.  Current correctional scale  Medium  Maintain anti-hyperglycemic dose as follows-   Antihyperglycemics (From admission, onward)      None        Humalog, Lantus, sliding scale  Hold Oral hypoglycemics " while patient is in the hospital.      VTE Risk Mitigation (From admission, onward)           Ordered     apixaban tablet 5 mg  2 times daily         08/06/24 0521     IP VTE LOW RISK PATIENT  Once         08/06/24 0521                    Discharge Planning   ZOIE: 8/7/2024     Code Status: Full Code   Is the patient medically ready for discharge?:     Reason for patient still in hospital (select all that apply): Patient trending condition  Discharge Plan A: Home                  Jessy Dee MD  Department of Hospital Medicine   [facility] - Medical Surgical [facility]

## 2024-08-07 NOTE — HOSPITAL COURSE
8/7/24 - pt. Is afebrile. Right lung with decreased breath sounds. Coarse rales. BS's in the 200 range. Will continue present treatment until cultures return.    8/8/24 - lungs sound clear this AM. Will start oral meds today.    8/9/24 - lungs remain clear to auscultation. He is ready to go home. To be followed by his PCP.

## 2024-08-07 NOTE — SUBJECTIVE & OBJECTIVE
Interval History: will continue present treatment.    Review of Systems  Objective:     Vital Signs (Most Recent):  Temp: 98.5 °F (36.9 °C) (08/07/24 0707)  Pulse: 95 (08/07/24 0714)  Resp: 18 (08/07/24 0714)  BP: 104/71 (08/07/24 0707)  SpO2: 99 % (08/07/24 0714) Vital Signs (24h Range):  Temp:  [98.3 °F (36.8 °C)-100.2 °F (37.9 °C)] 98.5 °F (36.9 °C)  Pulse:  [] 95  Resp:  [18-20] 18  SpO2:  [94 %-99 %] 99 %  BP: ()/(67-78) 104/71     Weight: 99.2 kg (218 lb 11.1 oz)  Body mass index is 29.66 kg/m².    Intake/Output Summary (Last 24 hours) at 8/7/2024 0824  Last data filed at 8/6/2024 2145  Gross per 24 hour   Intake 1624.41 ml   Output 1225 ml   Net 399.41 ml         Physical Exam        Significant Labs: All pertinent labs within the past 24 hours have been reviewed.    Significant Imaging: I have reviewed all pertinent imaging results/findings within the past 24 hours.

## 2024-08-08 PROBLEM — Z79.4 LONG TERM (CURRENT) USE OF INSULIN: Status: ACTIVE | Noted: 2024-06-18

## 2024-08-08 PROBLEM — E05.90 SUBCLINICAL HYPERTHYROIDISM: Status: ACTIVE | Noted: 2024-08-08

## 2024-08-08 PROBLEM — M86.171 OTHER ACUTE OSTEOMYELITIS, RIGHT ANKLE AND FOOT: Status: ACTIVE | Noted: 2024-06-18

## 2024-08-08 PROBLEM — Z45.2 ENCOUNTER FOR ADJUSTMENT AND MANAGEMENT OF VASCULAR ACCESS DEVICE: Status: ACTIVE | Noted: 2024-06-18

## 2024-08-08 PROBLEM — I25.10 ATHSCL HEART DISEASE OF NATIVE CORONARY ARTERY W/O ANG PCTRS: Status: ACTIVE | Noted: 2024-01-01

## 2024-08-08 PROBLEM — L97.512 NON-PRS CHRONIC ULCER OTH PRT RIGHT FOOT W FAT LAYER EXPOSED: Status: ACTIVE | Noted: 2024-06-11

## 2024-08-08 PROBLEM — I50.22 CHRONIC SYSTOLIC (CONGESTIVE) HEART FAILURE: Status: ACTIVE | Noted: 2024-01-01

## 2024-08-08 PROBLEM — R31.9 HEMATURIA: Status: ACTIVE | Noted: 2024-08-08

## 2024-08-08 PROBLEM — Z79.85 LONG-TERM (CURRENT) USE OF INJECTABLE NON-INSULIN ANTIDIABETIC DRUGS: Status: ACTIVE | Noted: 2024-06-18

## 2024-08-08 PROBLEM — Z79.84 LONG TERM (CURRENT) USE OF ORAL HYPOGLYCEMIC DRUGS: Status: ACTIVE | Noted: 2024-06-18

## 2024-08-08 PROBLEM — Z89.421 ACQUIRED ABSENCE OF OTHER RIGHT TOE(S): Status: ACTIVE | Noted: 2024-01-01

## 2024-08-08 PROBLEM — I11.0 HYPERTENSIVE HEART DISEASE WITH HEART FAILURE: Status: ACTIVE | Noted: 2024-01-01

## 2024-08-08 LAB
ALBUMIN SERPL BCP-MCNC: 2.1 G/DL (ref 3.5–5)
ALBUMIN/GLOB SERPL: 0.5 {RATIO}
ALP SERPL-CCNC: 60 U/L (ref 45–115)
ALT SERPL W P-5'-P-CCNC: 10 U/L (ref 16–61)
ANION GAP SERPL CALCULATED.3IONS-SCNC: 11 MMOL/L (ref 7–16)
AST SERPL W P-5'-P-CCNC: 13 U/L (ref 15–37)
BASOPHILS # BLD AUTO: 0.05 K/UL (ref 0–0.2)
BASOPHILS NFR BLD AUTO: 0.6 % (ref 0–1)
BILIRUB SERPL-MCNC: 0.4 MG/DL (ref ?–1.2)
BUN SERPL-MCNC: 6 MG/DL (ref 7–18)
BUN/CREAT SERPL: 5 (ref 6–20)
CALCIUM SERPL-MCNC: 8.4 MG/DL (ref 8.5–10.1)
CHLORIDE SERPL-SCNC: 104 MMOL/L (ref 98–107)
CO2 SERPL-SCNC: 28 MMOL/L (ref 21–32)
CREAT SERPL-MCNC: 1.11 MG/DL (ref 0.7–1.3)
DIFFERENTIAL METHOD BLD: ABNORMAL
EGFR (NO RACE VARIABLE) (RUSH/TITUS): 84 ML/MIN/1.73M2
EOSINOPHIL # BLD AUTO: 0.12 K/UL (ref 0–0.5)
EOSINOPHIL NFR BLD AUTO: 1.3 % (ref 1–4)
ERYTHROCYTE [DISTWIDTH] IN BLOOD BY AUTOMATED COUNT: 12.1 % (ref 11.5–14.5)
GLOBULIN SER-MCNC: 4.3 G/DL (ref 2–4)
GLUCOSE SERPL-MCNC: 175 MG/DL (ref 70–105)
GLUCOSE SERPL-MCNC: 205 MG/DL (ref 70–105)
GLUCOSE SERPL-MCNC: 226 MG/DL (ref 70–105)
GLUCOSE SERPL-MCNC: 252 MG/DL (ref 74–106)
GLUCOSE SERPL-MCNC: 267 MG/DL (ref 70–105)
HCT VFR BLD AUTO: 27.2 % (ref 40–54)
HGB BLD-MCNC: 9.7 G/DL (ref 13.5–18)
IMM GRANULOCYTES # BLD AUTO: 0.12 K/UL (ref 0–0.04)
IMM GRANULOCYTES NFR BLD: 1.3 % (ref 0–0.4)
LYMPHOCYTES # BLD AUTO: 1.64 K/UL (ref 1–4.8)
LYMPHOCYTES NFR BLD AUTO: 18.4 % (ref 27–41)
MCH RBC QN AUTO: 30.2 PG (ref 27–31)
MCHC RBC AUTO-ENTMCNC: 35.7 G/DL (ref 32–36)
MCV RBC AUTO: 84.7 FL (ref 80–96)
MONOCYTES # BLD AUTO: 0.86 K/UL (ref 0–0.8)
MONOCYTES NFR BLD AUTO: 9.6 % (ref 2–6)
MPC BLD CALC-MCNC: 9.8 FL (ref 9.4–12.4)
NEUTROPHILS # BLD AUTO: 6.13 K/UL (ref 1.8–7.7)
NEUTROPHILS NFR BLD AUTO: 68.8 % (ref 53–65)
NRBC # BLD AUTO: 0 X10E3/UL
NRBC, AUTO (.00): 0 %
PLATELET # BLD AUTO: 333 K/UL (ref 150–400)
POTASSIUM SERPL-SCNC: 3.8 MMOL/L (ref 3.5–5.1)
PROT SERPL-MCNC: 6.4 G/DL (ref 6.4–8.2)
RBC # BLD AUTO: 3.21 M/UL (ref 4.6–6.2)
SODIUM SERPL-SCNC: 139 MMOL/L (ref 136–145)
WBC # BLD AUTO: 8.92 K/UL (ref 4.5–11)

## 2024-08-08 PROCEDURE — 94761 N-INVAS EAR/PLS OXIMETRY MLT: CPT

## 2024-08-08 PROCEDURE — 25000003 PHARM REV CODE 250: Performed by: FAMILY MEDICINE

## 2024-08-08 PROCEDURE — 25000003 PHARM REV CODE 250: Performed by: INTERNAL MEDICINE

## 2024-08-08 PROCEDURE — 82962 GLUCOSE BLOOD TEST: CPT

## 2024-08-08 PROCEDURE — 36415 COLL VENOUS BLD VENIPUNCTURE: CPT | Performed by: EMERGENCY MEDICINE

## 2024-08-08 PROCEDURE — 85025 COMPLETE CBC W/AUTO DIFF WBC: CPT | Performed by: EMERGENCY MEDICINE

## 2024-08-08 PROCEDURE — 63600175 PHARM REV CODE 636 W HCPCS: Performed by: FAMILY MEDICINE

## 2024-08-08 PROCEDURE — 25000003 PHARM REV CODE 250: Performed by: EMERGENCY MEDICINE

## 2024-08-08 PROCEDURE — 63600175 PHARM REV CODE 636 W HCPCS: Performed by: EMERGENCY MEDICINE

## 2024-08-08 PROCEDURE — 11000001 HC ACUTE MED/SURG PRIVATE ROOM

## 2024-08-08 PROCEDURE — 80053 COMPREHEN METABOLIC PANEL: CPT | Performed by: EMERGENCY MEDICINE

## 2024-08-08 RX ORDER — METFORMIN HYDROCHLORIDE 750 MG/1
750 TABLET, EXTENDED RELEASE ORAL 2 TIMES DAILY WITH MEALS
COMMUNITY
Start: 2024-05-16

## 2024-08-08 RX ORDER — GLUCAGON 1 MG
1 KIT INJECTION
Status: DISCONTINUED | OUTPATIENT
Start: 2024-08-08 | End: 2024-08-09 | Stop reason: HOSPADM

## 2024-08-08 RX ORDER — AMIODARONE HYDROCHLORIDE 200 MG/1
200 TABLET ORAL DAILY
COMMUNITY
Start: 2024-06-18

## 2024-08-08 RX ORDER — IBUPROFEN 200 MG
16 TABLET ORAL
Status: DISCONTINUED | OUTPATIENT
Start: 2024-08-08 | End: 2024-08-09 | Stop reason: HOSPADM

## 2024-08-08 RX ORDER — METFORMIN HYDROCHLORIDE 750 MG/1
750 TABLET, EXTENDED RELEASE ORAL 2 TIMES DAILY WITH MEALS
Status: DISCONTINUED | OUTPATIENT
Start: 2024-08-08 | End: 2024-08-09 | Stop reason: HOSPADM

## 2024-08-08 RX ORDER — IBUPROFEN 200 MG
24 TABLET ORAL
Status: DISCONTINUED | OUTPATIENT
Start: 2024-08-08 | End: 2024-08-09 | Stop reason: HOSPADM

## 2024-08-08 RX ORDER — INSULIN ASPART 100 [IU]/ML
0-10 INJECTION, SOLUTION INTRAVENOUS; SUBCUTANEOUS
Status: DISCONTINUED | OUTPATIENT
Start: 2024-08-08 | End: 2024-08-09 | Stop reason: HOSPADM

## 2024-08-08 RX ORDER — CEFDINIR 300 MG/1
300 CAPSULE ORAL EVERY 12 HOURS
Status: DISCONTINUED | OUTPATIENT
Start: 2024-08-08 | End: 2024-08-09 | Stop reason: HOSPADM

## 2024-08-08 RX ORDER — INSULIN GLARGINE 100 [IU]/ML
25 INJECTION, SOLUTION SUBCUTANEOUS NIGHTLY
Status: DISCONTINUED | OUTPATIENT
Start: 2024-08-08 | End: 2024-08-09 | Stop reason: HOSPADM

## 2024-08-08 RX ADMIN — METFORMIN HYDROCHLORIDE 750 MG: 750 TABLET, EXTENDED RELEASE ORAL at 05:08

## 2024-08-08 RX ADMIN — DOXYCYCLINE HYCLATE 100 MG: 100 TABLET, COATED ORAL at 08:08

## 2024-08-08 RX ADMIN — APIXABAN 5 MG: 5 TABLET, FILM COATED ORAL at 08:08

## 2024-08-08 RX ADMIN — FAMOTIDINE 20 MG: 20 TABLET, FILM COATED ORAL at 09:08

## 2024-08-08 RX ADMIN — INSULIN ASPART 4 UNITS: 100 INJECTION, SOLUTION INTRAVENOUS; SUBCUTANEOUS at 06:08

## 2024-08-08 RX ADMIN — CEFDINIR 300 MG: 300 CAPSULE ORAL at 09:08

## 2024-08-08 RX ADMIN — AMIODARONE HYDROCHLORIDE 400 MG: 200 TABLET ORAL at 08:08

## 2024-08-08 RX ADMIN — AMIODARONE HYDROCHLORIDE 400 MG: 200 TABLET ORAL at 09:08

## 2024-08-08 RX ADMIN — ASPIRIN 81 MG: 81 TABLET, COATED ORAL at 08:08

## 2024-08-08 RX ADMIN — CEFDINIR 300 MG: 300 CAPSULE ORAL at 08:08

## 2024-08-08 RX ADMIN — GABAPENTIN 400 MG: 300 CAPSULE ORAL at 09:08

## 2024-08-08 RX ADMIN — METOPROLOL SUCCINATE 25 MG: 25 TABLET, EXTENDED RELEASE ORAL at 08:08

## 2024-08-08 RX ADMIN — INSULIN ASPART 2 UNITS: 100 INJECTION, SOLUTION INTRAVENOUS; SUBCUTANEOUS at 09:08

## 2024-08-08 RX ADMIN — GABAPENTIN 400 MG: 300 CAPSULE ORAL at 08:08

## 2024-08-08 RX ADMIN — HYDROCODONE BITARTRATE AND ACETAMINOPHEN 1 TABLET: 5; 325 TABLET ORAL at 05:08

## 2024-08-08 RX ADMIN — GABAPENTIN 400 MG: 300 CAPSULE ORAL at 03:08

## 2024-08-08 RX ADMIN — HYDROCODONE BITARTRATE AND ACETAMINOPHEN 1 TABLET: 5; 325 TABLET ORAL at 08:08

## 2024-08-08 RX ADMIN — FAMOTIDINE 20 MG: 20 TABLET, FILM COATED ORAL at 08:08

## 2024-08-08 RX ADMIN — INSULIN ASPART 8 UNITS: 100 INJECTION, SOLUTION INTRAVENOUS; SUBCUTANEOUS at 06:08

## 2024-08-08 RX ADMIN — DOXYCYCLINE HYCLATE 100 MG: 100 TABLET, COATED ORAL at 09:08

## 2024-08-08 RX ADMIN — INSULIN ASPART 6 UNITS: 100 INJECTION, SOLUTION INTRAVENOUS; SUBCUTANEOUS at 05:08

## 2024-08-08 RX ADMIN — APIXABAN 5 MG: 5 TABLET, FILM COATED ORAL at 09:08

## 2024-08-08 RX ADMIN — INSULIN GLARGINE 25 UNITS: 100 INJECTION, SOLUTION SUBCUTANEOUS at 09:08

## 2024-08-08 RX ADMIN — DEXTROSE MONOHYDRATE 1 G: 5 INJECTION INTRAVENOUS at 12:08

## 2024-08-08 NOTE — SUBJECTIVE & OBJECTIVE
Interval History: lungs are clear today. Orders revised.    Review of Systems  Objective:     Vital Signs (Most Recent):  Temp: 98.7 °F (37.1 °C) (08/08/24 0751)  Pulse: 96 (08/08/24 0751)  Resp: 18 (08/08/24 0751)  BP: 113/77 (08/08/24 0751)  SpO2: 98 % (08/08/24 0751) Vital Signs (24h Range):  Temp:  [98.1 °F (36.7 °C)-99 °F (37.2 °C)] 98.7 °F (37.1 °C)  Pulse:  [72-98] 96  Resp:  [18] 18  SpO2:  [90 %-100 %] 98 %  BP: (101-113)/(69-77) 113/77     Weight: 99.2 kg (218 lb 11.1 oz)  Body mass index is 29.66 kg/m².    Intake/Output Summary (Last 24 hours) at 8/8/2024 0829  Last data filed at 8/8/2024 0752  Gross per 24 hour   Intake 917.71 ml   Output 1600 ml   Net -682.29 ml         Physical Exam        Significant Labs: All pertinent labs within the past 24 hours have been reviewed.    Significant Imaging: I have reviewed all pertinent imaging results/findings within the past 24 hours.

## 2024-08-08 NOTE — PROGRESS NOTES
[facility] - [facility] Surgical [facility]  [facility] Medicine  Progress Note    Patient Name: Merline Ibarra  MRN: [id number]  Patient Class: IP- Inpatient   Admission Date: 8/5/2024  Length of Stay: 2 days  Attending Physician: Jessy Dee,*  Primary Care Provider: Sophie Chen FNP        Subjective:     Principal Problem:Community acquired pneumonia of right upper lobe of lung        HPI:  No notes on file    Overview/Hospital Course:  8/7/24 - pt. Is afebrile. Right lung with decreased breath sounds. Coarse rales. BS's in the 200 range. Will continue present treatment until cultures return.    8/8/24 - lungs sound clear this AM. Will start oral meds today.    Interval History: lungs are clear today. Orders revised.    Review of Systems  Objective:     Vital Signs (Most Recent):  Temp: 98.7 °F (37.1 °C) (08/08/24 0751)  Pulse: 96 (08/08/24 0751)  Resp: 18 (08/08/24 0751)  BP: 113/77 (08/08/24 0751)  SpO2: 98 % (08/08/24 0751) Vital Signs (24h Range):  Temp:  [98.1 °F (36.7 °C)-99 °F (37.2 °C)] 98.7 °F (37.1 °C)  Pulse:  [72-98] 96  Resp:  [18] 18  SpO2:  [90 %-100 %] 98 %  BP: (101-113)/(69-77) 113/77     Weight: 99.2 kg (218 lb 11.1 oz)  Body mass index is 29.66 kg/m².    Intake/Output Summary (Last 24 hours) at 8/8/2024 0829  Last data filed at 8/8/2024 0752  Gross per 24 hour   Intake 917.71 ml   Output 1600 ml   Net -682.29 ml         Physical Exam        Significant Labs: All pertinent labs within the past 24 hours have been reviewed.    Significant Imaging: I have reviewed all pertinent imaging results/findings within the past 24 hours.    Assessment/Plan:      * Community acquired pneumonia of right upper lobe of lung  Patient has a diagnosis of pneumonia. The cause of the pneumonia is suspected to be bacterial in etiology but organism is not known. The pneumonia is worsening due to mild hypoxia on room air . The patient has the following signs/symptoms of pneumonia: persistent hypoxia ,  "shortness of breath, and chest pain. The patient does have a current oxygen requirement and the patient does not have a home oxygen requirement. I have reviewed the pertinent imaging. The following cultures have been collected: Blood cultures The culture results are listed below.     Current antimicrobial regimen consists of the antibiotics listed below. Will monitor patient closely and continue current treatment plan unchanged.    Antibiotics (From admission, onward)      None        Rocephin, Zithromax    Microbiology Results (last 7 days)       Procedure Component Value Units Date/Time    Blood culture #1 [[id number]]     Order Status: Sent Specimen: Blood     Blood culture #2 [[id number]]     Order Status: Sent Specimen: Blood     Chlamydia/GC, PCR [[id number]] Collected: 08/05/24 2301    Order Status: Sent Specimen: Urine Updated: 08/06/24 0011    Urine culture [[id number]] Collected: 08/05/24 2301    Order Status: Sent Specimen: Urine, Clean Catch Updated: 08/05/24 2334            Atrial fibrillation with RVR  Patient with Paroxysmal (<7 days) atrial fibrillation which is uncontrolled currently with Beta Blocker and Amiodarone. Patient is currently in atrial fibrillation.[id number] Score: 1. HASBLED Score: 1. Anticoagulation indicated. Anticoagulation done with Eliquis .    Patient has been on Eliquis 5 mg twice daily long-term, continue.    Bacterial urinary infection  Patient treated with Rocephin, urine culture order, await results.      Uncontrolled type 2 diabetes mellitus with hyperglycemia  Patient's FSGs are uncontrolled due to hyperglycemia on current medication regimen.  Last A1c reviewed-   Lab Results   Component Value Date    HGBA1C 12.4 (H) 07/09/2024     Most recent fingerstick glucose reviewed- No results for input(s): "POCTGLUCOSE" in the last 24 hours.  Current correctional scale  Medium  Maintain anti-hyperglycemic dose as follows-   Antihyperglycemics (From admission, onward)      None      "   Humalog, Lantus, sliding scale  Hold Oral hypoglycemics while patient is in the hospital.      VTE Risk Mitigation (From admission, onward)           Ordered     apixaban tablet 5 mg  2 times daily         08/06/24 0521     IP VTE LOW RISK PATIENT  Once         08/06/24 0521                    Discharge Planning   ZOIE: 8/7/2024     Code Status: Full Code   Is the patient medically ready for discharge?:     Reason for patient still in hospital (select all that apply): Patient trending condition  Discharge Plan A: Home                  Jessy Dee MD  Department of Hospital Medicine   [facility]

## 2024-08-08 NOTE — PLAN OF CARE
Problem: Adult Inpatient Plan of Care  Goal: Plan of Care Review  Outcome: Progressing  Goal: Patient-Specific Goal (Individualized)  Outcome: Progressing  Goal: Absence of Hospital-Acquired Illness or Injury  Outcome: Progressing  Goal: Optimal Comfort and Wellbeing  Outcome: Progressing  Goal: Readiness for Transition of Care  Outcome: Progressing     Problem: Pneumonia  Goal: Fluid Balance  Outcome: Progressing     Problem: Diabetes Comorbidity  Goal: Blood Glucose Level Within Targeted Range  Outcome: Not Progressing

## 2024-08-08 NOTE — PLAN OF CARE
Problem: Adult Inpatient Plan of Care  Goal: Plan of Care Review  Outcome: Progressing  Goal: Patient-Specific Goal (Individualized)  Outcome: Progressing  Goal: Absence of Hospital-Acquired Illness or Injury  Outcome: Progressing  Goal: Optimal Comfort and Wellbeing  Outcome: Progressing  Goal: Readiness for Transition of Care  Outcome: Progressing     Problem: Diabetes Comorbidity  Goal: Blood Glucose Level Within Targeted Range  Outcome: Progressing  Intervention: Monitor and Manage Glycemia  Flowsheets (Taken 8/8/2024 0449)  Glycemic Management:   blood glucose monitored   oral hydration promoted   supplemental insulin given     Problem: Pneumonia  Goal: Fluid Balance  Outcome: Progressing  Goal: Resolution of Infection Signs and Symptoms  Outcome: Progressing  Intervention: Prevent Infection Progression  Flowsheets (Taken 8/8/2024 0449)  Fever Reduction/Comfort Measures:   fluid intake increased   lightweight bedding  Goal: Effective Oxygenation and Ventilation  Outcome: Progressing  Intervention: Promote Airway Secretion Clearance  Flowsheets (Taken 8/8/2024 0449)  Breathing Techniques/Airway Clearance: deep/controlled cough encouraged  Cough And Deep Breathing: done independently per patient  Intervention: Optimize Oxygenation and Ventilation  Flowsheets (Taken 8/8/2024 0449)  Airway/Ventilation Management: calming measures promoted  Head of Bed (HOB) Positioning: HOB at 30 degrees     Problem: Wound  Goal: Optimal Coping  Outcome: Progressing  Goal: Optimal Functional Ability  Outcome: Progressing  Goal: Absence of Infection Signs and Symptoms  Outcome: Progressing  Goal: Improved Oral Intake  Outcome: Progressing  Goal: Optimal Pain Control and Function  Outcome: Progressing  Goal: Skin Health and Integrity  Outcome: Progressing  Goal: Optimal Wound Healing  Outcome: Progressing

## 2024-08-09 VITALS
WEIGHT: 218.69 LBS | SYSTOLIC BLOOD PRESSURE: 102 MMHG | TEMPERATURE: 98 F | HEIGHT: 72 IN | HEART RATE: 81 BPM | DIASTOLIC BLOOD PRESSURE: 68 MMHG | OXYGEN SATURATION: 96 % | BODY MASS INDEX: 29.62 KG/M2 | RESPIRATION RATE: 18 BRPM

## 2024-08-09 LAB
ALBUMIN SERPL BCP-MCNC: 2.1 G/DL (ref 3.5–5)
ALBUMIN/GLOB SERPL: 0.5 {RATIO}
ALP SERPL-CCNC: 60 U/L (ref 45–115)
ALT SERPL W P-5'-P-CCNC: 12 U/L (ref 16–61)
ANION GAP SERPL CALCULATED.3IONS-SCNC: 12 MMOL/L (ref 7–16)
AST SERPL W P-5'-P-CCNC: 17 U/L (ref 15–37)
BASOPHILS # BLD AUTO: 0.06 K/UL (ref 0–0.2)
BASOPHILS NFR BLD AUTO: 0.8 % (ref 0–1)
BASOPHILS NFR BLD MANUAL: 2 % (ref 0–1)
BILIRUB SERPL-MCNC: 0.4 MG/DL (ref ?–1.2)
BUN SERPL-MCNC: 6 MG/DL (ref 7–18)
BUN/CREAT SERPL: 5 (ref 6–20)
CALCIUM SERPL-MCNC: 8.6 MG/DL (ref 8.5–10.1)
CHLORIDE SERPL-SCNC: 106 MMOL/L (ref 98–107)
CO2 SERPL-SCNC: 28 MMOL/L (ref 21–32)
CREAT SERPL-MCNC: 1.12 MG/DL (ref 0.7–1.3)
DIFFERENTIAL METHOD BLD: ABNORMAL
EGFR (NO RACE VARIABLE) (RUSH/TITUS): 84 ML/MIN/1.73M2
EOSINOPHIL # BLD AUTO: 0.17 K/UL (ref 0–0.5)
EOSINOPHIL NFR BLD AUTO: 2.2 % (ref 1–4)
ERYTHROCYTE [DISTWIDTH] IN BLOOD BY AUTOMATED COUNT: 12.3 % (ref 11.5–14.5)
GLOBULIN SER-MCNC: 4.5 G/DL (ref 2–4)
GLUCOSE SERPL-MCNC: 259 MG/DL (ref 74–106)
HCT VFR BLD AUTO: 28.3 % (ref 40–54)
HGB BLD-MCNC: 10.1 G/DL (ref 13.5–18)
IMM GRANULOCYTES # BLD AUTO: 0.13 K/UL (ref 0–0.04)
IMM GRANULOCYTES NFR BLD: 1.7 % (ref 0–0.4)
LYMPHOCYTES # BLD AUTO: 1.8 K/UL (ref 1–4.8)
LYMPHOCYTES NFR BLD AUTO: 23.7 % (ref 27–41)
LYMPHOCYTES NFR BLD MANUAL: 32 % (ref 27–41)
MCH RBC QN AUTO: 30.4 PG (ref 27–31)
MCHC RBC AUTO-ENTMCNC: 35.7 G/DL (ref 32–36)
MCV RBC AUTO: 85.2 FL (ref 80–96)
MONOCYTES # BLD AUTO: 0.75 K/UL (ref 0–0.8)
MONOCYTES NFR BLD AUTO: 9.9 % (ref 2–6)
MONOCYTES NFR BLD MANUAL: 6 % (ref 2–6)
MPC BLD CALC-MCNC: 9.3 FL (ref 9.4–12.4)
NEUTROPHILS # BLD AUTO: 4.68 K/UL (ref 1.8–7.7)
NEUTROPHILS NFR BLD AUTO: 61.7 % (ref 53–65)
NEUTS BAND NFR BLD MANUAL: 1 % (ref 1–5)
NEUTS SEG NFR BLD MANUAL: 59 % (ref 50–62)
NRBC # BLD AUTO: 0 X10E3/UL
NRBC, AUTO (.00): 0 %
PLATELET # BLD AUTO: 340 K/UL (ref 150–400)
PLATELET MORPHOLOGY: NORMAL
POTASSIUM SERPL-SCNC: 3.6 MMOL/L (ref 3.5–5.1)
PROT SERPL-MCNC: 6.6 G/DL (ref 6.4–8.2)
RBC # BLD AUTO: 3.32 M/UL (ref 4.6–6.2)
RBC MORPH BLD: NORMAL
SODIUM SERPL-SCNC: 142 MMOL/L (ref 136–145)
UA COMPLETE W REFLEX CULTURE PNL UR: ABNORMAL
WBC # BLD AUTO: 7.59 K/UL (ref 4.5–11)

## 2024-08-09 PROCEDURE — 63600175 PHARM REV CODE 636 W HCPCS: Performed by: FAMILY MEDICINE

## 2024-08-09 PROCEDURE — 80053 COMPREHEN METABOLIC PANEL: CPT | Performed by: EMERGENCY MEDICINE

## 2024-08-09 PROCEDURE — 94761 N-INVAS EAR/PLS OXIMETRY MLT: CPT

## 2024-08-09 PROCEDURE — 85025 COMPLETE CBC W/AUTO DIFF WBC: CPT | Performed by: EMERGENCY MEDICINE

## 2024-08-09 PROCEDURE — 25000003 PHARM REV CODE 250: Performed by: FAMILY MEDICINE

## 2024-08-09 PROCEDURE — 36415 COLL VENOUS BLD VENIPUNCTURE: CPT | Performed by: EMERGENCY MEDICINE

## 2024-08-09 PROCEDURE — 25000003 PHARM REV CODE 250: Performed by: EMERGENCY MEDICINE

## 2024-08-09 PROCEDURE — 25000003 PHARM REV CODE 250: Performed by: INTERNAL MEDICINE

## 2024-08-09 RX ORDER — CEFDINIR 300 MG/1
300 CAPSULE ORAL EVERY 12 HOURS
Qty: 20 CAPSULE | Refills: 0 | Status: SHIPPED | OUTPATIENT
Start: 2024-08-09 | End: 2024-08-19

## 2024-08-09 RX ADMIN — METFORMIN HYDROCHLORIDE 750 MG: 750 TABLET, EXTENDED RELEASE ORAL at 06:08

## 2024-08-09 RX ADMIN — APIXABAN 5 MG: 5 TABLET, FILM COATED ORAL at 08:08

## 2024-08-09 RX ADMIN — SENNOSIDES AND DOCUSATE SODIUM 1 TABLET: 50; 8.6 TABLET ORAL at 08:08

## 2024-08-09 RX ADMIN — CEFDINIR 300 MG: 300 CAPSULE ORAL at 08:08

## 2024-08-09 RX ADMIN — HYDROCODONE BITARTRATE AND ACETAMINOPHEN 1 TABLET: 5; 325 TABLET ORAL at 12:08

## 2024-08-09 RX ADMIN — FAMOTIDINE 20 MG: 20 TABLET, FILM COATED ORAL at 08:08

## 2024-08-09 RX ADMIN — DOXYCYCLINE HYCLATE 100 MG: 100 TABLET, COATED ORAL at 08:08

## 2024-08-09 RX ADMIN — POLYETHYLENE GLYCOL 3350 17 G: 17 POWDER, FOR SOLUTION ORAL at 08:08

## 2024-08-09 RX ADMIN — INSULIN ASPART 6 UNITS: 100 INJECTION, SOLUTION INTRAVENOUS; SUBCUTANEOUS at 06:08

## 2024-08-09 RX ADMIN — AMIODARONE HYDROCHLORIDE 400 MG: 200 TABLET ORAL at 08:08

## 2024-08-09 RX ADMIN — METOPROLOL SUCCINATE 25 MG: 25 TABLET, EXTENDED RELEASE ORAL at 08:08

## 2024-08-09 RX ADMIN — ASPIRIN 81 MG: 81 TABLET, COATED ORAL at 08:08

## 2024-08-09 RX ADMIN — GABAPENTIN 400 MG: 300 CAPSULE ORAL at 08:08

## 2024-08-09 RX ADMIN — HYDROCODONE BITARTRATE AND ACETAMINOPHEN 1 TABLET: 5; 325 TABLET ORAL at 08:08

## 2024-08-09 NOTE — PLAN OF CARE
Problem: Adult Inpatient Plan of Care  Goal: Plan of Care Review  Outcome: Adequate for Care Transition  Goal: Patient-Specific Goal (Individualized)  Outcome: Adequate for Care Transition     Problem: Pneumonia  Goal: Fluid Balance  Outcome: Adequate for Care Transition  Intervention: Monitor and Manage Fluid Balance  Flowsheets (Taken 8/9/2024 1041)  Fluid/Electrolyte Management: fluids provided

## 2024-08-09 NOTE — NURSING
Patient Rounds  Patient carried out via wheelchair to private car, discharged home with family. Personal belongings with patient.   by Nayeli Daley, RN at 8/9/24 1110   Patient Rounds  Tele box 1 removed from patient. Discharge instructions discussed with patient and family, verbalizes understanding.   by Nayeli Daley, RN at 8/9/24 1100   Patient Rounds  In chair awake. NADN. No needs voiced. CB in reach   by Nayeli Daley, RN at 8/9/24 0912   Patient Rounds  Patient up in room brushing his teeth, getting ready for discharge. PRN pain med given for c/o leg pain of 8/10. No other needs voiced. CB in reach   by Nayeli Daley, AVRIL at 8/9/24 0840   Patient Rounds  Routine meds given. Patient c/o leg pain of 8/10, requested pain med.   by aNyeli Daley, AVRIL at 8/9/24 0834   Patient Rounds   by Sola Philippe, Patient Care Assistant at 8/9/24 0820   Patient Rounds  Lying in bed awake. NADN. C/O pain to BLE's. No report of SOB or trouble breathing on room air. Patient complained that he didn't get what he wanted for breakfast. No other needs/complaints voiced. CB in reach   by Nayeli Daley, AVRIL at 8/9/24 0750

## 2024-08-09 NOTE — DISCHARGE INSTRUCTIONS
Discharge home with family. Continue taking home meds as prescribed.  one prescription for Cefdinir 300mg from your pharmacy. This was escripted to your pharmacy. Resume diabetic diet. Activity as tolerated. Call on Monday to schedule a follow-up appointment with your PCP. Return to ER with any worsening condition.

## 2024-08-09 NOTE — PLAN OF CARE
08/09/24 0829   Medicare Message   Important Message from Medicare regarding Discharge Appeal Rights Given to patient/caregiver;Explained to patient/caregiver;Signed/date by patient/caregiver   Date IMM was signed 08/09/24   Time IMM was signed 0825

## 2024-08-09 NOTE — PLAN OF CARE
Problem: Adult Inpatient Plan of Care  Goal: Plan of Care Review  Outcome: Progressing  Goal: Patient-Specific Goal (Individualized)  Outcome: Progressing  Goal: Absence of Hospital-Acquired Illness or Injury  Outcome: Progressing  Goal: Optimal Comfort and Wellbeing  Outcome: Progressing  Goal: Readiness for Transition of Care  Outcome: Progressing     Problem: Diabetes Comorbidity  Goal: Blood Glucose Level Within Targeted Range  Outcome: Progressing  Intervention: Monitor and Manage Glycemia  Flowsheets (Taken 8/9/2024 0507)  Glycemic Management:   blood glucose monitored   oral hydration promoted   supplemental insulin given     Problem: Pneumonia  Goal: Fluid Balance  Outcome: Progressing  Goal: Resolution of Infection Signs and Symptoms  Outcome: Progressing  Intervention: Prevent Infection Progression  Flowsheets (Taken 8/9/2024 0507)  Fever Reduction/Comfort Measures:   fluid intake increased   lightweight bedding  Goal: Effective Oxygenation and Ventilation  Outcome: Progressing  Intervention: Promote Airway Secretion Clearance  Flowsheets (Taken 8/9/2024 0507)  Cough And Deep Breathing: done independently per patient  Intervention: Optimize Oxygenation and Ventilation  Flowsheets (Taken 8/9/2024 0507)  Airway/Ventilation Management: calming measures promoted  Head of Bed (HOB) Positioning: HOB at 20-30 degrees     Problem: Wound  Goal: Optimal Coping  Outcome: Progressing  Goal: Optimal Functional Ability  Outcome: Progressing  Goal: Absence of Infection Signs and Symptoms  Outcome: Progressing  Goal: Improved Oral Intake  Outcome: Progressing  Goal: Optimal Pain Control and Function  Outcome: Progressing  Goal: Skin Health and Integrity  Outcome: Progressing  Goal: Optimal Wound Healing  Outcome: Progressing

## 2024-08-09 NOTE — PLAN OF CARE
[facility] - [facility] Surgical Unit  Discharge Final Note    Primary Care Provider: Sophie Chen FNP    Expected Discharge Date: 8/7/2024    Final Discharge Note (most recent)       Final Note - 08/09/24 0830          Final Note    Assessment Type Final Discharge Note (P)      Anticipated Discharge Disposition Home or Self Care (P)      What phone number can be called within the next 1-3 days to see how you are doing after discharge? [phone] (P)      Hospital Resources/Appts/Education Provided Provided patient/caregiver with written discharge plan information;Provided education on problems/symptoms using teachback;Appointments scheduled and added to AVS (P)         Post-Acute Status    Coverage [employer] (P)      Discharge Delays None known at this time (P)                      Important Message from Medicare  Important Message from Medicare regarding Discharge Appeal Rights: Given to patient/caregiver, Explained to patient/caregiver, Signed/date by patient/caregiver     Date IMM was signed: 08/09/24  Time IMM was signed: 0825

## 2024-08-09 NOTE — DISCHARGE SUMMARY
[facility]  Medical Surgical Unit  Hospital Medicine  Discharge Summary      Patient Name: Merline Ibarra  MRN: [id number]  BONILLA: [id number]  Patient Class: IP- Inpatient  Admission Date: 8/5/2024  Hospital Length of Stay: 3 days  Discharge Date and Time:  08/09/2024 8:39 AM  Attending Physician: Jessy Dee,*   Discharging Provider: Jessy Dee MD  Primary Care Provider: Sophie Chen FNP    Primary Care Team: Networked reference to record PCT     HPI:   No notes on file    * No surgery found *      Hospital Course:   8/7/24 - pt. Is afebrile. Right lung with decreased breath sounds. Coarse rales. BS's in the 200 range. Will continue present treatment until cultures return.    8/8/24 - lungs sound clear this AM. Will start oral meds today.    8/9/24 - lungs remain clear to auscultation. He is ready to go home. To be followed by his PCP.     Goals of Care Treatment Preferences:  Code Status: Full Code      SDOH Screening:  The patient was screened for utility difficulties, food insecurity, transport difficulties, housing insecurity, and interpersonal safety and there were no concerns identified this admission.     Consults:     No new Assessment & Plan notes have been filed under this hospital service since the last note was generated.  Service: Hospital Medicine    Final Active Diagnoses:    Diagnosis Date Noted POA    PRINCIPAL PROBLEM:  Community acquired pneumonia of right upper lobe of lung [J18.9] 08/06/2024 Yes    Atrial fibrillation with RVR [I48.91] 08/06/2024 Yes    Uncontrolled type 2 diabetes mellitus with hyperglycemia [E11.65] 01/04/2023 Yes    Bacterial urinary infection [N39.0, A49.9] 01/04/2023 Yes      Problems Resolved During this Admission:       Discharged Condition: stable    Disposition:     Follow Up:    Patient Instructions:   No discharge procedures on file.    Significant Diagnostic Studies: Labs: All labs within the past 24 hours have been  reviewed    Pending Diagnostic Studies:       None           Medications:  Reconciled Home Medications:      Medication List        START taking these medications      cefdinir 300 MG capsule  Commonly known as: OMNICEF  Take 1 capsule (300 mg total) by mouth every 12 (twelve) hours. for 10 days            CONTINUE taking these medications      amiodarone 200 MG Tab  Commonly known as: PACERONE  Take 200 mg by mouth once daily.     aspirin 81 MG EC tablet  Commonly known as: ECOTRIN  Take 81 mg by mouth once daily.     ELIQUIS 5 mg Tab  Generic drug: apixaban  Take 1 tablet (5 mg total) by mouth 2 (two) times daily.     gabapentin 400 MG capsule  Commonly known as: NEURONTIN  Take 400 mg by mouth 3 (three) times daily.     HYDROcodone-acetaminophen 7.5-325 mg per tablet  Commonly known as: NORCO  Take 1 tablet by mouth every 6 (six) hours as needed for Pain.     LANTUS SOLOSTAR U-100 INSULIN 100 unit/mL (3 mL) Inpn pen  Generic drug: insulin glargine U-100 (Lantus)  Inject 10 Units into the skin nightly.     metFORMIN 750 MG ER 24hr tablet  Commonly known as: GLUCOPHAGE-XR  Take 750 mg by mouth 2 (two) times daily with meals.     metoprolol succinate 25 MG 24 hr tablet  Commonly known as: TOPROL-XL  Take 25 mg by mouth once daily.     MOUNJARO 7.5 mg/0.5 mL Pnij  Generic drug: tirzepatide  Inject 7.5 mg into the skin once a week.     NovoLOG Flexpen U-100 Insulin 100 unit/mL (3 mL) Inpn pen  Generic drug: insulin aspart U-100  Inject 6 Units into the skin as needed.              Indwelling Lines/Drains at time of discharge:   Lines/Drains/Airways       None                   Time spent on the discharge of patient: 30 minutes         Jessy Dee MD  Department of Hospital Medicine  [facility] - [facility]

## 2024-08-12 ENCOUNTER — PATIENT OUTREACH (OUTPATIENT)
Dept: ADMINISTRATIVE | Facility: CLINIC | Age: 44
End: 2024-08-12

## 2024-08-12 LAB
BACTERIA BLD CULT: NORMAL
BACTERIA BLD CULT: NORMAL

## 2024-08-12 NOTE — PROGRESS NOTES
C3 nurse attempted to contact Merline Ibarra  for a TCC post hospital discharge follow up call. No answer. No voicemail available.The patient does not have a scheduled HOSFU appointment. C3 nurse was unable to schedule HOSFU appointment for [facility] PCP.

## 2024-08-20 ENCOUNTER — PATIENT OUTREACH (OUTPATIENT)
Facility: HOSPITAL | Age: 44
End: 2024-08-20
Payer: MEDICARE

## 2024-08-20 NOTE — PROGRESS NOTES
Population Health Chart Review & Patient Outreach Details    Updates Requested / Reviewed:  [x]  Care Team Updated  []  Care Everywhere Updated & Reviewed  []  [facility] Reviewed  []   Reviewed  []  MIIX Reviewed    Health Maintenance Topics Addressed and Outreach Outcomes / Actions Taken:           HbA1c & Other Labs []  Overdue Lab(s) Ordered    []  Overdue Lab(s) Scheduled    []  External Records Uploaded & Care Team Updated if Applicable    []  Primary Care Follow Up Visit Scheduled     []  Reminded Patient to Complete A1c Home Test    []  Patient Declined Scheduling Labs or Will Call Back to Schedule    [x]  Pt seeing new PCP per chart/no answer and no VM with telephone outreach               Blood Pressure Control []  Primary Care Follow Up Visit Scheduled     [x]  Most recent blood pressure reading is compliant    []  Patient Declined Remote Reading or Scheduling Appt - Escalated to PCP    []  Patient Will Call Back or Send Portal Message with Reading

## 2024-09-18 ENCOUNTER — HOSPITAL ENCOUNTER (EMERGENCY)
Facility: HOSPITAL | Age: 44
Discharge: HOME | End: 2024-09-18
Attending: SPECIALIST
Payer: MEDICARE

## 2024-09-18 VITALS
WEIGHT: 210 LBS | TEMPERATURE: 98 F | HEIGHT: 72 IN | OXYGEN SATURATION: 99 % | DIASTOLIC BLOOD PRESSURE: 75 MMHG | BODY MASS INDEX: 28.44 KG/M2 | HEART RATE: 101 BPM | SYSTOLIC BLOOD PRESSURE: 96 MMHG | RESPIRATION RATE: 20 BRPM

## 2024-09-18 DIAGNOSIS — R07.89 ATYPICAL CHEST PAIN: ICD-10-CM

## 2024-09-18 DIAGNOSIS — I10 HYPERTENSION, UNSPECIFIED TYPE: ICD-10-CM

## 2024-09-18 DIAGNOSIS — K21.9 GASTROESOPHAGEAL REFLUX DISEASE WITHOUT ESOPHAGITIS: ICD-10-CM

## 2024-09-18 DIAGNOSIS — N39.0 URINARY TRACT INFECTION WITHOUT HEMATURIA, SITE UNSPECIFIED: ICD-10-CM

## 2024-09-18 DIAGNOSIS — R07.9 CHEST PAIN: ICD-10-CM

## 2024-09-18 DIAGNOSIS — R07.81 PLEURODYNIA: Primary | ICD-10-CM

## 2024-09-18 LAB
ALBUMIN SERPL BCP-MCNC: 3.5 G/DL (ref 3.5–5)
ALBUMIN/GLOB SERPL: 0.7 {RATIO}
ALP SERPL-CCNC: 60 U/L (ref 45–115)
ALT SERPL W P-5'-P-CCNC: 16 U/L (ref 16–61)
AMPHET UR QL SCN: NEGATIVE
ANION GAP SERPL CALCULATED.3IONS-SCNC: 14 MMOL/L (ref 7–16)
APTT PPP: 34.2 SECONDS (ref 25.2–37.3)
AST SERPL W P-5'-P-CCNC: 14 U/L (ref 15–37)
BACTERIA #/AREA URNS HPF: ABNORMAL /HPF
BARBITURATES UR QL SCN: NEGATIVE
BASOPHILS # BLD AUTO: 0.05 K/UL (ref 0–0.2)
BASOPHILS NFR BLD AUTO: 0.5 % (ref 0–1)
BENZODIAZ METAB UR QL SCN: NEGATIVE
BILIRUB SERPL-MCNC: 0.6 MG/DL (ref ?–1.2)
BILIRUB UR QL STRIP: NEGATIVE
BUN SERPL-MCNC: 15 MG/DL (ref 7–18)
BUN/CREAT SERPL: 12 (ref 6–20)
CALCIUM SERPL-MCNC: 9.6 MG/DL (ref 8.5–10.1)
CANNABINOIDS UR QL SCN: NEGATIVE
CHLORIDE SERPL-SCNC: 102 MMOL/L (ref 98–107)
CLARITY UR: ABNORMAL
CO2 SERPL-SCNC: 27 MMOL/L (ref 21–32)
COCAINE UR QL SCN: NEGATIVE
COLOR UR: YELLOW
CREAT SERPL-MCNC: 1.24 MG/DL (ref 0.7–1.3)
D DIMER PPP FEU-MCNC: 0.45 ΜG/ML (ref 0–0.47)
DIFFERENTIAL METHOD BLD: ABNORMAL
EGFR (NO RACE VARIABLE) (RUSH/TITUS): 74 ML/MIN/1.73M2
EOSINOPHIL # BLD AUTO: 0.06 K/UL (ref 0–0.5)
EOSINOPHIL NFR BLD AUTO: 0.6 % (ref 1–4)
ERYTHROCYTE [DISTWIDTH] IN BLOOD BY AUTOMATED COUNT: 12.7 % (ref 11.5–14.5)
GLOBULIN SER-MCNC: 5 G/DL (ref 2–4)
GLUCOSE SERPL-MCNC: 175 MG/DL (ref 70–105)
GLUCOSE SERPL-MCNC: 281 MG/DL (ref 74–106)
GLUCOSE SERPL-MCNC: 290 MG/DL (ref 70–105)
GLUCOSE UR STRIP-MCNC: 500 MG/DL
HCT VFR BLD AUTO: 42.5 % (ref 40–54)
HGB BLD-MCNC: 14.9 G/DL (ref 13.5–18)
IMM GRANULOCYTES # BLD AUTO: 0.05 K/UL (ref 0–0.04)
IMM GRANULOCYTES NFR BLD: 0.5 % (ref 0–0.4)
INR BLD: 1.28
KETONES UR STRIP-SCNC: NEGATIVE MG/DL
LEUKOCYTE ESTERASE UR QL STRIP: ABNORMAL
LIPASE SERPL-CCNC: 24 U/L (ref 16–77)
LYMPHOCYTES # BLD AUTO: 2.13 K/UL (ref 1–4.8)
LYMPHOCYTES NFR BLD AUTO: 21 % (ref 27–41)
MAGNESIUM SERPL-MCNC: 1.7 MG/DL (ref 1.7–2.3)
MCH RBC QN AUTO: 30 PG (ref 27–31)
MCHC RBC AUTO-ENTMCNC: 35.1 G/DL (ref 32–36)
MCV RBC AUTO: 85.5 FL (ref 80–96)
MONOCYTES # BLD AUTO: 0.36 K/UL (ref 0–0.8)
MONOCYTES NFR BLD AUTO: 3.6 % (ref 2–6)
MPC BLD CALC-MCNC: 9.9 FL (ref 9.4–12.4)
NEUTROPHILS # BLD AUTO: 7.49 K/UL (ref 1.8–7.7)
NEUTROPHILS NFR BLD AUTO: 73.8 % (ref 53–65)
NITRITE UR QL STRIP: NEGATIVE
NRBC # BLD AUTO: 0 X10E3/UL
NRBC, AUTO (.00): 0 %
NT-PROBNP SERPL-MCNC: 323 PG/ML (ref 1–125)
OPIATES UR QL SCN: NEGATIVE
PCP UR QL SCN: NEGATIVE
PH UR STRIP: 5.5 PH UNITS
PLATELET # BLD AUTO: 381 K/UL (ref 150–400)
POTASSIUM SERPL-SCNC: 4.4 MMOL/L (ref 3.5–5.1)
PROT SERPL-MCNC: 8.5 G/DL (ref 6.4–8.2)
PROT UR QL STRIP: ABNORMAL
PROTHROMBIN TIME: 15.9 SECONDS (ref 11.7–14.7)
RBC # BLD AUTO: 4.97 M/UL (ref 4.6–6.2)
RBC # UR STRIP: ABNORMAL /UL
RBC #/AREA URNS HPF: ABNORMAL /HPF
SODIUM SERPL-SCNC: 139 MMOL/L (ref 136–145)
SP GR UR STRIP: 1.02
SQUAMOUS #/AREA URNS LPF: ABNORMAL /LPF
TROPONIN I SERPL DL<=0.01 NG/ML-MCNC: 6.1 PG/ML
TROPONIN I SERPL DL<=0.01 NG/ML-MCNC: 7.7 PG/ML
UROBILINOGEN UR STRIP-ACNC: 0.2 MG/DL
WBC # BLD AUTO: 10.14 K/UL (ref 4.5–11)
WBC #/AREA URNS HPF: ABNORMAL /HPF
YEAST #/AREA URNS HPF: ABNORMAL /HPF

## 2024-09-18 PROCEDURE — 81003 URINALYSIS AUTO W/O SCOPE: CPT | Performed by: SPECIALIST

## 2024-09-18 PROCEDURE — 85730 THROMBOPLASTIN TIME PARTIAL: CPT | Performed by: SPECIALIST

## 2024-09-18 PROCEDURE — 83690 ASSAY OF LIPASE: CPT | Performed by: SPECIALIST

## 2024-09-18 PROCEDURE — 82962 GLUCOSE BLOOD TEST: CPT

## 2024-09-18 PROCEDURE — 93010 ELECTROCARDIOGRAM REPORT: CPT | Mod: ,,, | Performed by: INTERNAL MEDICINE

## 2024-09-18 PROCEDURE — 25000003 PHARM REV CODE 250: Performed by: SPECIALIST

## 2024-09-18 PROCEDURE — 87086 URINE CULTURE/COLONY COUNT: CPT | Performed by: SPECIALIST

## 2024-09-18 PROCEDURE — 96365 THER/PROPH/DIAG IV INF INIT: CPT

## 2024-09-18 PROCEDURE — 80053 COMPREHEN METABOLIC PANEL: CPT | Performed by: SPECIALIST

## 2024-09-18 PROCEDURE — 85025 COMPLETE CBC W/AUTO DIFF WBC: CPT | Performed by: SPECIALIST

## 2024-09-18 PROCEDURE — 83735 ASSAY OF MAGNESIUM: CPT | Performed by: SPECIALIST

## 2024-09-18 PROCEDURE — 99285 EMERGENCY DEPT VISIT HI MDM: CPT | Mod: 25

## 2024-09-18 PROCEDURE — 85610 PROTHROMBIN TIME: CPT | Performed by: SPECIALIST

## 2024-09-18 PROCEDURE — 85379 FIBRIN DEGRADATION QUANT: CPT | Performed by: SPECIALIST

## 2024-09-18 PROCEDURE — 93005 ELECTROCARDIOGRAM TRACING: CPT

## 2024-09-18 PROCEDURE — 96375 TX/PRO/DX INJ NEW DRUG ADDON: CPT

## 2024-09-18 PROCEDURE — 99285 EMERGENCY DEPT VISIT HI MDM: CPT | Performed by: SPECIALIST

## 2024-09-18 PROCEDURE — 83880 ASSAY OF NATRIURETIC PEPTIDE: CPT | Performed by: SPECIALIST

## 2024-09-18 PROCEDURE — 63600175 PHARM REV CODE 636 W HCPCS: Performed by: SPECIALIST

## 2024-09-18 PROCEDURE — 80307 DRUG TEST PRSMV CHEM ANLYZR: CPT | Performed by: SPECIALIST

## 2024-09-18 PROCEDURE — 84484 ASSAY OF TROPONIN QUANT: CPT | Performed by: SPECIALIST

## 2024-09-18 PROCEDURE — 81001 URINALYSIS AUTO W/SCOPE: CPT | Performed by: SPECIALIST

## 2024-09-18 RX ORDER — PANTOPRAZOLE SODIUM 40 MG/1
40 TABLET, DELAYED RELEASE ORAL DAILY
Qty: 30 TABLET | Refills: 0 | Status: SHIPPED | OUTPATIENT
Start: 2024-09-18 | End: 2025-09-18

## 2024-09-18 RX ORDER — METOPROLOL TARTRATE 1 MG/ML
5 INJECTION, SOLUTION INTRAVENOUS
Status: COMPLETED | OUTPATIENT
Start: 2024-09-18 | End: 2024-09-18

## 2024-09-18 RX ORDER — ALUMINUM HYDROXIDE, MAGNESIUM HYDROXIDE, AND SIMETHICONE 1200; 120; 1200 MG/30ML; MG/30ML; MG/30ML
30 SUSPENSION ORAL ONCE
Status: COMPLETED | OUTPATIENT
Start: 2024-09-18 | End: 2024-09-18

## 2024-09-18 RX ORDER — LIDOCAINE HYDROCHLORIDE 20 MG/ML
15 SOLUTION OROPHARYNGEAL ONCE
Status: COMPLETED | OUTPATIENT
Start: 2024-09-18 | End: 2024-09-18

## 2024-09-18 RX ORDER — CIPROFLOXACIN 500 MG/1
500 TABLET ORAL EVERY 12 HOURS
Qty: 28 TABLET | Refills: 0 | Status: SHIPPED | OUTPATIENT
Start: 2024-09-18 | End: 2024-10-02

## 2024-09-18 RX ORDER — ONDANSETRON HYDROCHLORIDE 2 MG/ML
4 INJECTION, SOLUTION INTRAVENOUS
Status: COMPLETED | OUTPATIENT
Start: 2024-09-18 | End: 2024-09-18

## 2024-09-18 RX ORDER — MORPHINE SULFATE 4 MG/ML
4 INJECTION, SOLUTION INTRAMUSCULAR; INTRAVENOUS
Status: COMPLETED | OUTPATIENT
Start: 2024-09-18 | End: 2024-09-18

## 2024-09-18 RX ORDER — ASPIRIN 325 MG
325 TABLET ORAL
Status: COMPLETED | OUTPATIENT
Start: 2024-09-18 | End: 2024-09-18

## 2024-09-18 RX ORDER — HYDROCODONE BITARTRATE AND ACETAMINOPHEN 10; 325 MG/1; MG/1
1 TABLET ORAL
Status: COMPLETED | OUTPATIENT
Start: 2024-09-18 | End: 2024-09-18

## 2024-09-18 RX ADMIN — LIDOCAINE HYDROCHLORIDE 15 ML: 20 SOLUTION ORAL at 03:09

## 2024-09-18 RX ADMIN — HYDROCODONE BITARTRATE AND ACETAMINOPHEN 1 TABLET: 10; 325 TABLET ORAL at 05:09

## 2024-09-18 RX ADMIN — ASPIRIN 325 MG ORAL TABLET 325 MG: 325 PILL ORAL at 01:09

## 2024-09-18 RX ADMIN — ALUMINUM HYDROXIDE, MAGNESIUM HYDROXIDE, AND SIMETHICONE 30 ML: 1200; 120; 1200 SUSPENSION ORAL at 03:09

## 2024-09-18 RX ADMIN — METOPROLOL TARTRATE 5 MG: 1 INJECTION, SOLUTION INTRAVENOUS at 01:09

## 2024-09-18 RX ADMIN — ONDANSETRON 4 MG: 2 INJECTION INTRAMUSCULAR; INTRAVENOUS at 01:09

## 2024-09-18 RX ADMIN — MORPHINE SULFATE 4 MG: 4 INJECTION, SOLUTION INTRAMUSCULAR; INTRAVENOUS at 01:09

## 2024-09-18 RX ADMIN — SODIUM CHLORIDE 500 ML: 9 INJECTION, SOLUTION INTRAVENOUS at 05:09

## 2024-09-18 RX ADMIN — DEXTROSE MONOHYDRATE 1 G: 5 INJECTION INTRAVENOUS at 04:09

## 2024-09-18 NOTE — ED PROVIDER NOTES
Encounter Date: 9/18/2024  reevaluation of patient:  patient states that pain is sharp and burning.  GI cocktail given.  His pain is in the epigastric area left and right.  1st troponin is normal and quite low.    Patient has no comprehension that other entities could be causing his chest pain.  Despite Morphine 4 mg IV which did take care of it he states that it still came back.         History     Chief Complaint   Patient presents with    Chest Pain     C/o chest pain starting earlier this am     Patient is a 43 yo AA male who comes to ER complaining of Chest Pain.  He has history of DMT2, Afib, HTN, and CHF.  History of cardioversion.  Patient has his cardiologist Dr. Huddleston at [facility].  Patient states that his chest pain is 7/10 in strength.  Shortness of breath but no nausea or diaphoresis.   CP began at 10 am.  Sternal CP radiates to the right.  Also believes he may have a urinary tract infection.  Urine is strong.       Review of patient's allergies indicates:   Allergen Reactions    Ace inhibitors Anaphylaxis and Swelling    Lisinopril Swelling     Past Medical History:   Diagnosis Date    A-fib     CHF (congestive heart failure)     Diabetes mellitus     Hypertension      Past Surgical History:   Procedure Laterality Date    CARDIOVERSION       Family History   Problem Relation Name Age of Onset    Diabetes Mother      Heart disease Mother      Diabetes Father      Heart disease Father       Social History     Tobacco Use    Smoking status: Former    Smokeless tobacco: Never   Substance Use Topics    Alcohol use: Yes     Comment: OCCASIONALLY     Drug use: Yes     Types: Marijuana     Review of Systems   Constitutional: Negative.    HENT: Negative.     Eyes: Negative.    Respiratory:  Positive for chest tightness and shortness of breath.    Cardiovascular:  Positive for chest pain and palpitations.   Endocrine: Negative.    Genitourinary: Negative.    Musculoskeletal: Negative.     Allergic/Immunologic: Negative.    Neurological: Negative.    Hematological: Negative.    Psychiatric/Behavioral: Negative.     All other systems reviewed and are negative.      Physical Exam     Initial Vitals [09/18/24 1344]   BP Pulse Resp Temp SpO2   (!) 136/94 (!) 117 20 98.2 °F (36.8 °C) 99 %      MAP       --         Physical Exam    Nursing note and vitals reviewed.  Constitutional: He appears well-developed and well-nourished. No distress.   HENT:   Head: Normocephalic and atraumatic.   Mouth/Throat: Oropharynx is clear and moist.   Eyes: Conjunctivae are normal. Pupils are equal, round, and reactive to light.   Neck: Neck supple.   Normal range of motion.  Cardiovascular:  Normal rate and normal heart sounds.           Irreg irreg rhthym   Pulmonary/Chest: Breath sounds normal.   Abdominal: Abdomen is soft. Bowel sounds are normal. There is no abdominal tenderness.   Musculoskeletal:         General: No tenderness or edema. Normal range of motion.      Cervical back: Normal range of motion and neck supple.     Neurological: He is alert and oriented to person, place, and time. He has normal strength. GCS score is 15. GCS eye subscore is 4. GCS verbal subscore is 5. GCS motor subscore is 6.   Skin: Skin is warm. Capillary refill takes less than 2 seconds.   Psychiatric: He has a normal mood and affect. His behavior is normal. Judgment and thought content normal.         Medical Screening Exam   See Full Note    ED Course   Procedures  Labs Reviewed   CULTURE, URINE - Abnormal       Result Value    Culture, Urine >100,000 Escherichia coli (*)    URINALYSIS, REFLEX TO URINE CULTURE - Abnormal    Color, UA Yellow      Clarity, UA Cloudy      pH, UA 5.5      Leukocytes, UA Small (*)     Nitrites, UA Negative      Protein, UA Trace (*)     Glucose,  (*)     Ketones, UA Negative      Urobilinogen, UA 0.2      Bilirubin, UA Negative      Blood, UA Trace-Intact (*)     Specific Gravity, UA 1.020      COMPREHENSIVE METABOLIC PANEL - Abnormal    Sodium 139      Potassium 4.4      Chloride 102      CO2 27      Anion Gap 14      Glucose 281 (*)     BUN 15      Creatinine 1.24      BUN/Creatinine Ratio 12      Calcium 9.6      Total Protein 8.5 (*)     Albumin 3.5      Globulin 5.0 (*)     A/G Ratio 0.7      Bilirubin, Total 0.6      Alk Phos 60      ALT 16      AST 14 (*)     eGFR 74     NT-PRO NATRIURETIC PEPTIDE - Abnormal    ProBNP 323 (*)    PROTIME-INR - Abnormal    PT 15.9 (*)     INR 1.28     CBC WITH DIFFERENTIAL - Abnormal    WBC 10.14      RBC 4.97      Hemoglobin 14.9      Hematocrit 42.5      MCV 85.5      MCH 30.0      MCHC 35.1      RDW 12.7      Platelet Count 381      MPV 9.9      Neutrophils % 73.8 (*)     Lymphocytes % 21.0 (*)     Monocytes % 3.6      Eosinophils % 0.6 (*)     Basophils % 0.5      Immature Granulocytes % 0.5 (*)     nRBC, Auto 0.0      Neutrophils, Abs 7.49      Lymphocytes, Absolute 2.13      Monocytes, Absolute 0.36      Eosinophils, Absolute 0.06      Basophils, Absolute 0.05      Immature Granulocytes, Absolute 0.05 (*)     nRBC, Absolute 0.00      Diff Type Auto     URINALYSIS, MICROSCOPIC - Abnormal    WBC, UA Too Numerous To Count (*)     RBC, UA 0-3      Bacteria, UA Loaded (*)     Yeast, UA None Seen      Squamous Epithelial Cells, UA None Seen     POCT GLUCOSE MONITORING CONTINUOUS - Abnormal    POC Glucose 290 (*)    POCT GLUCOSE MONITORING CONTINUOUS - Abnormal    POC Glucose 175 (*)    TROPONIN I - Normal    Troponin I High Sensitivity 7.7     MAGNESIUM - Normal    Magnesium 1.7     DRUG SCREEN, URINE (BEAKER) - Normal    Barbiturates, Urine Negative      Benzodiazepine, Urine Negative      Opiates, Urine Negative      Phencyclidine, Urine Negative      Amphetamine, Urine Negative      Cannabinoid, Urine Negative      Cocaine, Urine Negative      Narrative:     The results of screening tests should be considered presumptive. Confirmatory testing is available upon  request.    Cutoff Points:  PCP:         25ng/mL  AMPH:        500ng/mL  JOHNATHAN:        200ng/mL  LYNN:        200ng/mL  THC:         50ng/mL  ARIAN:         300ng/mL  OPI:         2000ng/mL   APTT - Normal    PTT 34.2     TROPONIN I - Normal    Troponin I High Sensitivity 6.1     LIPASE - Normal    Lipase 24     D DIMER, QUANTITATIVE - Normal    D-Dimer 0.45     CBC W/ AUTO DIFFERENTIAL    Narrative:     The following orders were created for panel order CBC auto differential.  Procedure                               Abnormality         Status                     ---------                               -----------         ------                     CBC with Differential[[id number]]       Abnormal            Final result                 Please view results for these tests on the individual orders.     EKG Readings: (Independently Interpreted)   Initial Reading: No STEMI. Rhythm: Atrial Fibrillation. Heart Rate: unusual p axis. Ectopy: No Ectopy.       Imaging Results              X-Ray Chest 1 View (Final result)  Result time 09/18/24 14:36:25      Final result by Renan Kent MD (09/18/24 14:36:25)                   Impression:      No acute chest disease.      Electronically signed by: Renan Kent  Date:    09/18/2024  Time:    14:36               Narrative:    EXAMINATION:  XR CHEST 1 VIEW    CLINICAL HISTORY:  Chest pain, unspecified    TECHNIQUE:  Single frontal view of the chest was performed.    COMPARISON:  08/06/2024.    FINDINGS:  Lungs are clear.  No focal consolidation.  Heart size normal.  Mediastinal contours unremarkable.  Trachea midline.    Bony thorax intact.                                    X-Rays:   Independently Interpreted Readings:   Other Readings:  NAD    Medications   aspirin tablet 325 mg (325 mg Oral Given 9/18/24 1355)   metoprolol injection 5 mg (5 mg Intravenous Given 9/18/24 1356)   morphine injection 4 mg (4 mg Intravenous Given 9/18/24 1354)   ondansetron injection 4 mg (4 mg  Intravenous Given 9/18/24 1356)   aluminum-magnesium hydroxide-simethicone 200-200-20 mg/5 mL suspension 30 mL (30 mLs Oral Given 9/18/24 1521)     And   LIDOcaine viscous HCl 2% oral solution 15 mL (15 mLs Oral Given 9/18/24 1521)   cefTRIAXone (Rocephin) 1 g in D5W 100 mL IVPB (MB+) (0 g Intravenous Stopped 9/18/24 1716)   HYDROcodone-acetaminophen  mg per tablet 1 tablet (1 tablet Oral Given 9/18/24 1715)   sodium chloride 0.9% bolus 500 mL 500 mL (500 mLs Intravenous New Bag 9/18/24 1746)     Medical Decision Making  43 yo with known Afib.  History of chest pain since 10 am.    Amount and/or Complexity of Data Reviewed  Labs: ordered. Decision-making details documented in ED Course.  Radiology: ordered. Decision-making details documented in ED Course.  ECG/medicine tests:  Decision-making details documented in ED Course.    Risk  OTC drugs.  Prescription drug management.  Risk Details: CP 7/10 since 10 am with radiation from sternum to right lung.               ED Course as of 09/20/24 0925   Wed Sep 18, 2024   1737 Patient still with a sharp burning pain despite all necessary measures He will need to f/u with his Cardiologist I will give him some narcotic meds since that does alleviate some of his pleuritic pain [VB]      ED Course User Index  [VB] Angelica Archuleta MD                           Clinical Impression:   Final diagnoses:  [R07.9] Chest pain  [R07.81] Pleurodynia (Primary)  [K21.9] Gastroesophageal reflux disease without esophagitis  [R07.89] Atypical chest pain  [I10] Hypertension, unspecified type  [N39.0] Urinary tract infection without hematuria, site unspecified        ED Disposition Condition    Discharge Stable          ED Prescriptions       Medication Sig Dispense Start Date End Date Auth. Provider    ciprofloxacin HCl (CIPRO) 500 MG tablet Take 1 tablet (500 mg total) by mouth every 12 (twelve) hours. for 14 days 28 tablet 9/18/2024 10/2/2024 Angelica Archuleta MD    pantoprazole  (PROTONIX) 40 MG tablet Take 1 tablet (40 mg total) by mouth once daily. 30 tablet 9/18/2024 9/18/2025 Angelica Archuleta MD          Follow-up Information    None          Angelica Archuleta MD  09/18/24 0709       Angelica Archuleta MD  09/18/24 4088       Angelica Archuleta MD  09/20/24 2165

## 2024-09-18 NOTE — DISCHARGE INSTRUCTIONS
Follow up with your cardiologist.    Follow up with PCP for urinary tract recheck in one week to make sure cipro is working  Increase fluids   your antibiotics at the pharmacy tomorrow so that your infection is gone and your Blood Sugars will get better as well.

## 2024-09-20 LAB — UA COMPLETE W REFLEX CULTURE PNL UR: ABNORMAL

## 2024-09-25 LAB
OHS QRS DURATION: 110 MS
OHS QTC CALCULATION: 506 MS

## 2024-11-08 NOTE — PT/OT/SLP PROGRESS
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    PT Received On: 01/05/23  PT Start Time: 0915     PT Stop Time: 0954    PT Total Time (min): 39 min       Billable Minutes:  Gait training 8 minutes, Ther-Ex 16 minutes, Ther-Act 15 minutes     Treatment Type: Treatment  PT/PTA: PT     PTA Visit Number: 0       General Precautions: Standard, fall  Orthopedic Precautions: RLE partial weight bearing (heel weightbearing only)  Braces:    Respiratory Status: Room air    Spiritual, Cultural Beliefs, Orthodoxy Practices, Values that Affect Care: no    Subjective:  Patient reported 7/10 pain in R foot, but he had already received his pain medication.     Pain/Comfort  Pain Rating 1: 7/10  Location - Side 1: Right  Location - Orientation 1: generalized  Location 1: foot  Pain Addressed 1: Other (see comments) (patient reports that he has already has his medication this morning.)    Objective:        Functional Mobility:  Bed Mobility: not completed today        Transfers: 3 x 5 sit<> stand from standard chair to RW with SBA.        Gait: 150 feet with straight cane on level surface with CGA and minimal verbal cueing and visual demonstration for proper sequencing, improved step lengths, and not placing cane too far ahead of him during ambulation        Balance:   Static Sit: NORMAL: No deviations seen in posture held statically  Dynamic Sit: NORMAL: No deviations seen in posture held dynamically  Static Stand: GOOD-: Takes MODERATE challenges from all directions inconsistently  Dynamic stand: FAIR+: Needs CLOSE SUPERVISION during gait and is able to right self with minor LOB       Treatment and Education:    Ther-Ex  Reps        NuStep  8 mins    LAQs  3 x 10 3" 2# on B LE    Hip ADD  30 x 3"    Hip ABD  30 x green TB    HS curls  20 x green TB B     "   Ther-Act Reps       Standing hip ABD  2 x 10    Standing hip Flexion  2 x 10    Standing hip Ext  2 x 10    Sit<>stand  3 x 5 reps       AM-PAC 6 CLICK MOBILITY  How much help from another person does this patient currently need?   1 = Unable, Total/Dependent Assistance  2 = A lot, Maximum/Moderate Assistance  3 = A little, Minimum/Contact Guard/Supervision  4 = None, Modified [place]/Independent    Turning over in bed (including adjusting bedclothes, sheets and blankets)?: 4  Sitting down on and standing up from a chair with arms (e.g., wheelchair, bedside commode, etc.): 4  Moving from lying on back to sitting on the side of the bed?: 4  Moving to and from a bed to a chair (including a wheelchair)?: 3  Need to walk in hospital room?: 3  Climbing 3-5 steps with a railing?: 2  Basic Mobility Total Score: 20    AM-PAC Raw Score CMS G-Code Modifier Level of Impairment Assistance   6 % Total / Unable   7 - 9 CM 80 - 100% Maximal Assist   10 - 14 CL 60 - 80% Moderate Assist   15 - 19 CK 40 - 60% Moderate Assist   20 - 22 CJ 20 - 40% Minimal Assist   23 CI 1-20% SBA / CGA   24 CH 0% Independent/ Mod I     Patient left HOB elevated with call button in reach.    Assessment:  Merline Ibarra is a 42 y.o. male with a medical diagnosis of Diabetic ulcer of toe of right foot associated with type 2 diabetes mellitus, with bone involvement without evidence of necrosis and presents with weakness, decreased balance, and decreased endurance. PT provided patient with cueing for improved standing posture and decreased compensations during standing activities. Patient required demonstration for proper sequencing and improved gait mechanics for progressing to using straight cane for ambulation. Patient with no adverse effects to treatment.     Rehab identified problem list/impairments: weakness, impaired endurance, gait instability, impaired balance, pain, impaired functional mobility    Rehab potential is  good.    Activity tolerance: Good    Discharge recommendations: home health PT      Barriers to discharge:      Equipment recommendations: walker, rolling     GOALS:   Multidisciplinary Problems       Physical Therapy Goals       Not on file                    PLAN:    Patient to be seen 5 x/week to address the above listed problems via gait training, therapeutic activities, therapeutic exercises  Plan of Care expires: 02/20/23  Plan of Care reviewed with: patient     Burke Jermaine, PT, DPT       01/05/2023   No

## 2024-11-12 ENCOUNTER — HOSPITAL ENCOUNTER (EMERGENCY)
Facility: HOSPITAL | Age: 44
Discharge: HOME | End: 2024-11-12
Attending: EMERGENCY MEDICINE
Payer: MEDICARE

## 2024-11-12 VITALS
HEART RATE: 99 BPM | RESPIRATION RATE: 20 BRPM | TEMPERATURE: 98 F | WEIGHT: 221.13 LBS | DIASTOLIC BLOOD PRESSURE: 73 MMHG | SYSTOLIC BLOOD PRESSURE: 100 MMHG | BODY MASS INDEX: 29.95 KG/M2 | OXYGEN SATURATION: 94 % | HEIGHT: 72 IN

## 2024-11-12 DIAGNOSIS — G44.89 OTHER HEADACHE SYNDROME: Primary | ICD-10-CM

## 2024-11-12 LAB
ALBUMIN SERPL BCP-MCNC: 3.4 G/DL (ref 3.5–5)
ALBUMIN/GLOB SERPL: 0.9 {RATIO}
ALP SERPL-CCNC: 55 U/L (ref 45–115)
ALT SERPL W P-5'-P-CCNC: 10 U/L (ref 16–61)
ANION GAP SERPL CALCULATED.3IONS-SCNC: 12 MMOL/L (ref 7–16)
AST SERPL W P-5'-P-CCNC: 8 U/L (ref 15–37)
BASOPHILS # BLD AUTO: 0.05 K/UL (ref 0–0.2)
BASOPHILS NFR BLD AUTO: 0.7 % (ref 0–1)
BILIRUB SERPL-MCNC: 0.4 MG/DL (ref ?–1.2)
BUN SERPL-MCNC: 7 MG/DL (ref 7–18)
BUN/CREAT SERPL: 6 (ref 6–20)
CALCIUM SERPL-MCNC: 8.9 MG/DL (ref 8.5–10.1)
CHLORIDE SERPL-SCNC: 109 MMOL/L (ref 98–107)
CO2 SERPL-SCNC: 28 MMOL/L (ref 21–32)
CREAT SERPL-MCNC: 1.27 MG/DL (ref 0.7–1.3)
DIFFERENTIAL METHOD BLD: ABNORMAL
EGFR (NO RACE VARIABLE) (RUSH/TITUS): 72 ML/MIN/1.73M2
EOSINOPHIL # BLD AUTO: 0.07 K/UL (ref 0–0.5)
EOSINOPHIL NFR BLD AUTO: 1 % (ref 1–4)
ERYTHROCYTE [DISTWIDTH] IN BLOOD BY AUTOMATED COUNT: 14.5 % (ref 11.5–14.5)
ERYTHROCYTE [SEDIMENTATION RATE] IN BLOOD BY WESTERGREN METHOD: 13 MM/HR (ref 0–15)
GLOBULIN SER-MCNC: 4 G/DL (ref 2–4)
GLUCOSE SERPL-MCNC: 138 MG/DL (ref 70–105)
GLUCOSE SERPL-MCNC: 277 MG/DL (ref 70–105)
GLUCOSE SERPL-MCNC: 98 MG/DL (ref 74–106)
HCT VFR BLD AUTO: 42 % (ref 40–54)
HGB BLD-MCNC: 14.6 G/DL (ref 13.5–18)
IMM GRANULOCYTES # BLD AUTO: 0.05 K/UL (ref 0–0.04)
IMM GRANULOCYTES NFR BLD: 0.7 % (ref 0–0.4)
INFLUENZA A MOLECULAR (OHS): NEGATIVE
INFLUENZA B MOLECULAR (OHS): NEGATIVE
LYMPHOCYTES # BLD AUTO: 2.64 K/UL (ref 1–4.8)
LYMPHOCYTES NFR BLD AUTO: 37.2 % (ref 27–41)
MCH RBC QN AUTO: 29.8 PG (ref 27–31)
MCHC RBC AUTO-ENTMCNC: 34.8 G/DL (ref 32–36)
MCV RBC AUTO: 85.7 FL (ref 80–96)
MONOCYTES # BLD AUTO: 0.4 K/UL (ref 0–0.8)
MONOCYTES NFR BLD AUTO: 5.6 % (ref 2–6)
MPC BLD CALC-MCNC: 9.9 FL (ref 9.4–12.4)
NEUTROPHILS # BLD AUTO: 3.88 K/UL (ref 1.8–7.7)
NEUTROPHILS NFR BLD AUTO: 54.8 % (ref 53–65)
NRBC # BLD AUTO: 0 X10E3/UL
NRBC, AUTO (.00): 0 %
PLATELET # BLD AUTO: 255 K/UL (ref 150–400)
POTASSIUM SERPL-SCNC: 3.5 MMOL/L (ref 3.5–5.1)
PROT SERPL-MCNC: 7.4 G/DL (ref 6.4–8.2)
RBC # BLD AUTO: 4.9 M/UL (ref 4.6–6.2)
SARS-COV-2 RDRP RESP QL NAA+PROBE: NEGATIVE
SODIUM SERPL-SCNC: 145 MMOL/L (ref 136–145)
WBC # BLD AUTO: 7.09 K/UL (ref 4.5–11)

## 2024-11-12 PROCEDURE — 87502 INFLUENZA DNA AMP PROBE: CPT | Performed by: EMERGENCY MEDICINE

## 2024-11-12 PROCEDURE — 85025 COMPLETE CBC W/AUTO DIFF WBC: CPT | Performed by: EMERGENCY MEDICINE

## 2024-11-12 PROCEDURE — 96375 TX/PRO/DX INJ NEW DRUG ADDON: CPT

## 2024-11-12 PROCEDURE — 80053 COMPREHEN METABOLIC PANEL: CPT | Performed by: EMERGENCY MEDICINE

## 2024-11-12 PROCEDURE — 82962 GLUCOSE BLOOD TEST: CPT

## 2024-11-12 PROCEDURE — 96374 THER/PROPH/DIAG INJ IV PUSH: CPT

## 2024-11-12 PROCEDURE — 63600175 PHARM REV CODE 636 W HCPCS: Performed by: EMERGENCY MEDICINE

## 2024-11-12 PROCEDURE — 96361 HYDRATE IV INFUSION ADD-ON: CPT

## 2024-11-12 PROCEDURE — 87635 SARS-COV-2 COVID-19 AMP PRB: CPT | Performed by: EMERGENCY MEDICINE

## 2024-11-12 PROCEDURE — 99284 EMERGENCY DEPT VISIT MOD MDM: CPT | Performed by: EMERGENCY MEDICINE

## 2024-11-12 PROCEDURE — 85651 RBC SED RATE NONAUTOMATED: CPT | Performed by: EMERGENCY MEDICINE

## 2024-11-12 PROCEDURE — 25000003 PHARM REV CODE 250: Performed by: EMERGENCY MEDICINE

## 2024-11-12 PROCEDURE — 99285 EMERGENCY DEPT VISIT HI MDM: CPT | Mod: 25

## 2024-11-12 RX ORDER — PROCHLORPERAZINE EDISYLATE 5 MG/ML
10 INJECTION INTRAMUSCULAR; INTRAVENOUS
Status: COMPLETED | OUTPATIENT
Start: 2024-11-12 | End: 2024-11-12

## 2024-11-12 RX ORDER — BLOOD-GLUCOSE SENSOR
EACH MISCELLANEOUS
COMMUNITY
Start: 2024-07-25

## 2024-11-12 RX ORDER — ACETAMINOPHEN 500 MG
1000 TABLET ORAL
Status: COMPLETED | OUTPATIENT
Start: 2024-11-12 | End: 2024-11-12

## 2024-11-12 RX ORDER — PEN NEEDLE, DIABETIC 30 GX3/16"
NEEDLE, DISPOSABLE MISCELLANEOUS
COMMUNITY
Start: 2024-07-25

## 2024-11-12 RX ORDER — AMPICILLIN 500 MG/1
500 CAPSULE ORAL 2 TIMES DAILY
COMMUNITY
Start: 2024-10-25

## 2024-11-12 RX ORDER — PEN NEEDLE, DIABETIC 32 GX 1/4"
NEEDLE, DISPOSABLE MISCELLANEOUS 4 TIMES DAILY
COMMUNITY
Start: 2024-07-25

## 2024-11-12 RX ORDER — MORPHINE SULFATE 2 MG/ML
2 INJECTION, SOLUTION INTRAMUSCULAR; INTRAVENOUS
Status: COMPLETED | OUTPATIENT
Start: 2024-11-12 | End: 2024-11-12

## 2024-11-12 RX ORDER — SULFAMETHOXAZOLE AND TRIMETHOPRIM 800; 160 MG/1; MG/1
1 TABLET ORAL 2 TIMES DAILY
COMMUNITY
Start: 2024-10-25

## 2024-11-12 RX ORDER — KETOROLAC TROMETHAMINE 15 MG/ML
15 INJECTION, SOLUTION INTRAMUSCULAR; INTRAVENOUS ONCE
Status: COMPLETED | OUTPATIENT
Start: 2024-11-12 | End: 2024-11-12

## 2024-11-12 RX ORDER — BLOOD-GLUCOSE,RECEIVER,CONT
EACH MISCELLANEOUS
COMMUNITY
Start: 2024-07-25

## 2024-11-12 RX ADMIN — ACETAMINOPHEN 1000 MG: 500 TABLET ORAL at 10:11

## 2024-11-12 RX ADMIN — HUMAN INSULIN 5 UNITS: 100 INJECTION, SOLUTION SUBCUTANEOUS at 10:11

## 2024-11-12 RX ADMIN — PROCHLORPERAZINE EDISYLATE 10 MG: 5 INJECTION INTRAMUSCULAR; INTRAVENOUS at 01:11

## 2024-11-12 RX ADMIN — KETOROLAC TROMETHAMINE 15 MG: 15 INJECTION, SOLUTION INTRAMUSCULAR; INTRAVENOUS at 12:11

## 2024-11-12 RX ADMIN — MORPHINE SULFATE 2 MG: 2 INJECTION, SOLUTION INTRAMUSCULAR; INTRAVENOUS at 01:11

## 2024-11-12 RX ADMIN — SODIUM CHLORIDE 1000 ML: 0.9 INJECTION, SOLUTION INTRAVENOUS at 10:11

## 2024-11-12 NOTE — ED NOTES
Pt sitting up at bedside crying in pain with his head hurting worse than arrival. Dr. Gaston aware.

## 2024-11-12 NOTE — ED TRIAGE NOTES
PATIENT PRESENTED TO ER WITH FAMILY MEMBER FOR C/O HEADACHE X 3 DAYS; STATES HE TOOK IBUPROFEN THIS MORNING AROUND 1 AM;  CURRENTLY ON AMPICILLIN 500 MG BID & BACTRIM AFTER HAVING FOOT SURGERY ON RIGHT FOOT

## 2024-11-12 NOTE — ED PROVIDER NOTES
Encounter Date: 11/12/2024       History     Chief Complaint   Patient presents with    Headache     Patient presents with report of headache for 3 days, when it started he states his eyes felt puffy but that has resolved.  Denies cough, fever, or nasal congestion.  Headache is primarily on the left side.  No neurologic deficits.      Review of patient's allergies indicates:   Allergen Reactions    Ace inhibitors Anaphylaxis and Swelling    Lisinopril Swelling     Past Medical History:   Diagnosis Date    A-fib     CHF (congestive heart failure)     Diabetes mellitus     Hypertension      Past Surgical History:   Procedure Laterality Date    CARDIOVERSION      FOOT SURGERY Right      Family History   Problem Relation Name Age of Onset    Diabetes Mother      Heart disease Mother      Diabetes Father      Heart disease Father       Social History     Tobacco Use    Smoking status: Former    Smokeless tobacco: Never   Substance Use Topics    Alcohol use: Yes     Comment: OCCASIONALLY     Drug use: Yes     Types: Marijuana     Review of Systems   Constitutional:  Negative for activity change, appetite change, diaphoresis, fatigue and fever.   HENT: Negative.     Eyes: Negative.    Respiratory: Negative.     Cardiovascular: Negative.    Gastrointestinal: Negative.    Genitourinary: Negative.    Musculoskeletal: Negative.    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   Neurological:  Positive for headaches. Negative for dizziness, tremors, seizures, syncope, facial asymmetry, speech difficulty, weakness, light-headedness and numbness.   Psychiatric/Behavioral: Negative.     All other systems reviewed and are negative.      Physical Exam     Initial Vitals [11/12/24 1004]   BP Pulse Resp Temp SpO2   123/85 (!) 114 17 97.5 °F (36.4 °C) 100 %      MAP       --         Physical Exam    Nursing note and vitals reviewed.  Constitutional: He appears well-developed and well-nourished.    HENT:   Right Ear: External ear normal.   Left Ear: External ear normal.   Nose: Nose normal. Mouth/Throat: Mucous membranes are dry.   Eyes: Conjunctivae and EOM are normal. Pupils are equal, round, and reactive to light. No scleral icterus.   Neck: Neck supple. No JVD present.   Normal range of motion.  Cardiovascular:  Regular rhythm, normal heart sounds and intact distal pulses.   Tachycardia present.         No murmur heard.  Pulmonary/Chest: Breath sounds normal. No stridor. No respiratory distress. He has no wheezes. He has no rhonchi. He has no rales.   Abdominal: Abdomen is soft. Bowel sounds are normal. He exhibits no distension. There is no abdominal tenderness.   Musculoskeletal:         General: No edema. Normal range of motion.      Cervical back: Normal range of motion and neck supple.        Feet:       Comments: Patient has an area of dark eschar right foot, plantar surface as illustrated.  No drainage.  Has several amputations of the toes on this foot as well which appear well healed.     Lymphadenopathy:     He has no cervical adenopathy.   Neurological: He is alert and oriented to person, place, and time. He has normal strength. No cranial nerve deficit. GCS score is 15. GCS eye subscore is 4. GCS verbal subscore is 5. GCS motor subscore is 6.   Skin: Skin is warm and dry. Capillary refill takes 2 to 3 seconds. No rash noted. No erythema. No pallor.   Psychiatric: He has a normal mood and affect. His behavior is normal.         Medical Screening Exam   See Full Note    ED Course   Procedures  Labs Reviewed   COMPREHENSIVE METABOLIC PANEL - Abnormal       Result Value    Sodium 145      Potassium 3.5      Chloride 109 (*)     CO2 28      Anion Gap 12      Glucose 98      BUN 7      Creatinine 1.27      BUN/Creatinine Ratio 6      Calcium 8.9      Total Protein 7.4      Albumin 3.4 (*)     Globulin 4.0      A/G Ratio 0.9      Bilirubin, Total 0.4      Alk Phos 55      ALT 10 (*)     AST 8 (*)      eGFR 72     CBC WITH DIFFERENTIAL - Abnormal    WBC 7.09      RBC 4.90      Hemoglobin 14.6      Hematocrit 42.0      MCV 85.7      MCH 29.8      MCHC 34.8      RDW 14.5      Platelet Count 255      MPV 9.9      Neutrophils % 54.8      Lymphocytes % 37.2      Monocytes % 5.6      Eosinophils % 1.0      Basophils % 0.7      Immature Granulocytes % 0.7 (*)     nRBC, Auto 0.0      Neutrophils, Abs 3.88      Lymphocytes, Absolute 2.64      Monocytes, Absolute 0.40      Eosinophils, Absolute 0.07      Basophils, Absolute 0.05      Immature Granulocytes, Absolute 0.05 (*)     nRBC, Absolute 0.00      Diff Type Auto     POCT GLUCOSE MONITORING CONTINUOUS - Abnormal    POC Glucose 277 (*)    POCT GLUCOSE MONITORING CONTINUOUS - Abnormal    POC Glucose 138 (*)    INFLUENZA A & B BY MOLECULAR - Normal    INFLUENZA A MOLECULAR Negative      INFLUENZA B MOLECULAR  Negative     SARS-COV-2 RNA AMPLIFICATION, QUAL - Normal    SARS COV-2 Molecular Negative      Narrative:     Negative SARS-CoV results should not be used as the sole basis for treatment or patient management decisions; negative results should be considered in the context of a patient's recent exposures, history and the presene of clinical signs and symptoms consistent with COVID-19.  Negative results should be treated as presumptive and confirmed by molecular assay, if necessary for patient management.   SEDIMENTATION RATE, AUTOMATED - Normal    ESR Westergren 13     CBC W/ AUTO DIFFERENTIAL    Narrative:     The following orders were created for panel order CBC auto differential.  Procedure                               Abnormality         Status                     ---------                               -----------         ------                     CBC with Differential[[id number]]       Abnormal            Final result                 Please view results for these tests on the individual orders.   POCT GLUCOSE MONITORING CONTINUOUS          Imaging Results               CT Head Without Contrast (Final result)  Result time 11/12/24 12:38:40      Final result by Harvey Daniel MD (11/12/24 12:38:40)                   Impression:      No definite acute findings by noncontrast CT.      Electronically signed by: Harvey Daniel  Date:    11/12/2024  Time:    12:38               Narrative:    EXAMINATION:  CT HEAD WITHOUT CONTRAST    CLINICAL HISTORY:  Headache, chronic, new features or increased frequency;    TECHNIQUE:  Low dose axial images were obtained through the head.  Coronal and sagittal reformations were also performed. Contrast was not administered.    COMPARISON:  CT head performed 09/18/2023.    FINDINGS:  Blood: No acute intracranial hemorrhage.    Parenchyma: No definite loss of gray-white differentiation to suggest acute or subacute transcortical infarct. Generalized pattern age-related parenchymal volume loss.  Nonspecific areas of white matter hypoattenuation may reflect sequela of chronic small vessel ischemic disease.    Ventricles/Extra-axial spaces: No abnormal extra-axial fluid collection. Basal cisterns are patent.    Vessels: Mild atherosclerotic calcification.    Orbits: Status post bilateral lens replacements.    Scalp: Unremarkable.    Skull: There are no depressed skull fractures or destructive bone lesions.    Sinuses and mastoids: Scattered mild paranasal sinus mucosal thickening.    Other findings: None                                       Medications   sodium chloride 0.9% bolus 1,000 mL 1,000 mL (0 mLs Intravenous Stopped 11/12/24 1152)   insulin regular injection 5 Units 0.05 mL (5 Units Intravenous Given 11/12/24 1052)   acetaminophen tablet 1,000 mg (1,000 mg Oral Given 11/12/24 1042)   ketorolac injection 15 mg (15 mg Intravenous Given 11/12/24 1207)   morphine injection 2 mg (2 mg Intravenous Given 11/12/24 1316)   prochlorperazine injection Soln 10 mg (10 mg Intravenous Given 11/12/24 1311)     Medical Decision Making  Differential  diagnosis includes volume depletion, uncontrolled type 2 diabetes, chronic diabetic foot wound, other causes for headache.    Patient was treated with IV fluid, was given IV insulin, and headache was treated with Tylenol.    Patient states headache is no better and is in fact worse.  Lab and CT head scan ordered.    Patient headache was treated further with Toradol and then finally morphine with prochlorperazine and headache improved.  Head CT scan was normal, labs unremarkable.  Sed rate normal.  No localized tenderness over the left temporal artery.  Discharge and follow up instructions given.    Amount and/or Complexity of Data Reviewed  Labs: ordered. Decision-making details documented in ED Course.  Radiology: ordered. Decision-making details documented in ED Course.    Risk  OTC drugs.  Prescription drug management.               ED Course as of 11/12/24 1456   Tue Nov 12, 2024   1128 Influenza A & B by Molecular  Influenza a and B tests are negative [LM]   1128 COVID-19 Rapid Screening  COVID test is negative [LM]   1128 POCT glucose(!)  Initial glucose was elevated at 277 mg/dL. [LM]   1150 POCT glucose(!)  Repeat glucose after treatment with IV insulin is 138 mg/dL [LM]   1150 CBC auto differential(!)  CBC shows normal white blood cell count, normal hemoglobin and hematocrit, normal platelet count. [LM]   1257 Comprehensive metabolic panel(!)  CMP shows normal sodium and potassium, normal CO2 and anion gap.  Glucose is 98.  BUN creatinine are normal, estimated GFR is 72. [LM]   1258 CT Head Without Contrast  Review of radiologist's report for CT scan of the head shows no intracranial abnormality. [LM]   1452 Sedimentation Rate  Sed rate is normal [LM]      ED Course User Index  [LM] Hay Gaston DO                             Clinical Impression:   Final diagnoses:  [G44.89] Other headache syndrome (Primary)        ED Disposition Condition    Discharge Stable          ED Prescriptions    None        Follow-up Information       Follow up With Specialties Details Why Contact Info    Sophie Chen, PAULA [facility] Schedule an appointment as soon as possible for a visit in 1 day To recheck; sooner if worse, not improving, or if any new symptoms. [id number]  [place] [address]  [phone]               Hay Gaston DO  11/12/24 6233

## 2025-02-25 DIAGNOSIS — R26.81 GAIT INSTABILITY: ICD-10-CM

## 2025-02-25 DIAGNOSIS — S98.139A: ICD-10-CM

## 2025-02-25 DIAGNOSIS — I96 TOE GANGRENE: Primary | ICD-10-CM

## 2025-03-10 ENCOUNTER — CLINICAL SUPPORT (OUTPATIENT)
Dept: REHABILITATION | Facility: HOSPITAL | Age: 45
End: 2025-03-10
Payer: MEDICARE

## 2025-03-10 DIAGNOSIS — R29.898 WEAKNESS OF BOTH LOWER EXTREMITIES: ICD-10-CM

## 2025-03-10 DIAGNOSIS — I96 TOE GANGRENE: ICD-10-CM

## 2025-03-10 DIAGNOSIS — S98.139A: ICD-10-CM

## 2025-03-10 DIAGNOSIS — R26.81 GAIT INSTABILITY: ICD-10-CM

## 2025-03-10 DIAGNOSIS — R26.89 BALANCE PROBLEM: Primary | ICD-10-CM

## 2025-03-10 PROCEDURE — 97161 PT EVAL LOW COMPLEX 20 MIN: CPT

## 2025-03-10 NOTE — LETTER
March 11, 2025  Spring Connell NP  [address] [facility] [place] [address]    To whom it may concern,     The attached plan of care is being sent to you for review and reference.    You may indicate your approval by signing the document electronically, or by faxing/mailing a signed copy of the final page of this document back to the attention of Burke Dean, GREG, DPT:         Plan of Care 3/11/25   Effective from: 3/11/2025  Effective to: 4/18/2025    Plan ID: [id number]            Participants as of Finalize on 3/11/2025    Name Type Comments Contact Info    Spring Connell NP Referring Provider  [phone]    Burke Dean PT, DPT Physical Therapist         Last Plan Note     Author: Burke Dean PT, DPT Status: Addendum Last edited: 3/10/2025 12:30 PM         Outpatient Rehab    Physical Therapy Evaluation (only)    Patient Name: Merline Ibarra  MRN: [id number]  YOB: 1980  Encounter Date: 3/10/2025    Therapy Diagnosis:   Encounter Diagnoses   Name Primary?    Toe gangrene     Amputation of one or more toes     Gait instability     Balance problem Yes    Weakness of both lower extremities      Physician: Spring Connell NP    Physician Orders: Eval and Treat  Medical Diagnosis: toe gangreen, ampuation of multiple toes, and gait instability    Visit # / Visits Authorized:  1 / 12   Date of Evaluation:  3/10/2025   Insurance Authorization Period: 2/25/2025 to 2/25/2026  Plan of Care Certification:  3/10/2025 to 4/18/2025      Time In: 1230   Time Out: 1250  Total Time: 20   Total Billable Time: 20 minutes     Intake Outcome Measure for FOTO Survey    Therapist reviewed FOTO scores for Merline Ibarra on 3/10/2025.   FOTO report - see Media section or FOTO account episode details.     Intake Score: 42%       Subjective   History of Present Illness  Merline is a 44 y.o. male who reports to physical therapy with a chief concern of R foot pain and decreased balance.     The  patient reports a medical diagnosis of toe gangrene, amputation of multiple toes, gait instability. The patient has experienced this issue since 02/25/25. Patient reports a surgery of transmetatarsal amputation. Surgery occurred on 02/18/25. Diagnostic tests related to this condition: None.        History of Present Condition/Illness: Patient reports that he had his fourth and fifth toes on the R foot amputated two years ago. He then had the great toe amputated followed by transmetatarsal amputation of remaining toes on 2/18/2025. Patient reports having difficulty with his balance since surgery which is limiting his safety and independence with mobility tasks. He also reports pain in his R foot that is increased with increased activity levels making it difficult to stand to prepare his own food at this time.     Activities of Daily Living  Social history was obtained from Patient.    General Prior Level of Function Comments: Independent  General Current Level of Function Comments: Patient reports independence at this time except cooking  Patient Responsibilities: Home management, Driving    Previously independent with activities of daily living? Yes     Currently independent with activities of daily living? Yes          Previously independent with instrumental activities of daily living? Yes     Currently independent with instrumental activities of daily living? No  Activities currently needing assistance include: Meal prep.            Pain     Patient reports a current pain level of 5/10. Pain at best is reported as 8/10. Pain at worst is reported as 8/10.   Location: R foot  Clinical Progression (since onset): Stable  Pain Qualities: Sharp, Aching, Throbbing  Pain-Relieving Factors: Medications - prescription, Activity modification, Rest  Pain-Aggravating Factors: Movement, Standing, Walking, Other (Comment)  Other Pain-Aggravating Factors: increased activity level such as with bathing and dressing tasks          Treatment History  Treatments  Discharged From Past 30 Days: Inpatient acute facility, Inpatient rehab facility  Previously Received Treatments: Yes  Previous Treatments: Physical therapy, Medications - prescription    Living Arrangements  Living Situation  Housing: Home independently  Living Arrangements: Alone  Support Systems: Family members, Friends/neighbors    Home Setup  Home Access: Stairs with rails  Entrance Stairs - Rails: Both  Number of Levels in Home: One level        Employment  Patient reports: Does the patient's condition impact their ability to work?  Employment Status: On disability          Past Medical History/Physical Systems Review:   Merline Ibarra  has a past medical history of A-fib, CHF (congestive heart failure), Diabetes mellitus, and Hypertension.    Merline Ibarra  has a past surgical history that includes Cardioversion and Foot surgery (Right).    Merline has a current medication list which includes the following prescription(s): amiodarone, ampicillin, aspirin, bd ultra-fine micro pen needle, freestyle lalo 3 reader, freestyle lalo 3 sensor, eliquis, gabapentin, hydrocodone-acetaminophen, lantus solostar u-100 insulin, metformin, metoprolol succinate, mounjaro, novolog flexpen u-100 insulin, pantoprazole, pen needle, diabetic, sulfamethoxazole-trimethoprim 800-160mg, and [DISCONTINUED] insulin nph-insulin regular (70/30).    Review of patient's allergies indicates:   Allergen Reactions    Ace inhibitors Anaphylaxis and Swelling    Lisinopril Swelling        Objective       Hip Range of Motion    Full bilateral hip AROM    Knee Range of Motion    Full bilateral knee active range of motion     Ankle/Foot Range of Motion      Patient has decreased bilateral ankle active range of motion due to weakness.               Hip Strength - Planes of Motion   Right Strength Right Pain Left Strength Left  Pain   Flexion (L2) 4-   4-     Extension 4-   4-     ABduction 4-   4-      ADduction 4-   4-     Internal Rotation 4-   4-     External Rotation 4-   4-         Knee Strength   Right Strength Right Pain Left Strength Left  Pain   Flexion (S2) 4-   4-     Prone Flexion 4-   4-     Extension (L3) 4-   4-            Ankle/Foot Strength - Planes of Motion   Right Strength Right Pain Left Strength Left  Pain   Dorsiflexion (L4) 2-   2-     Plantar Flexion (S1) 2-   2-     Inversion 2-   2-     Eversion 2-   2-     Great Toe Flexion 2-   2-     Great Toe Extension (L5) 2-   2-     Lesser Toes Flexion 2-   2-     Lesser Toes Extension 2-   2-            Transfers Assessment  Sit to Stand Assistance: Contact guard assist  Chair to Bed Assistance: Contact guard assist      Ambulation Assistance Required  Surface With  Assistive Device Without Assistive Device Details   Level Contact guard assist Unable      Uneven Minimal assist Unable     Curb Minimal assist Unable       Stairs Assistance Required   Assistance Level Upper Extremity Support Pattern   Ascending Minimal assist Two rails Non-reciprocal   Descending Minimal assist Two rails Non-reciprocal        Gait Analysis  Walking Speed: Decreased    Right Side Walking Observations  Decreased: Stance Time  Right Foot Contact Pattern: Flat foot    Left Side Walking Observations  Decreased: Swing Time  Left Foot Contact Pattern: Flat foot  Ankle/Foot Observations During Gait  Left: Decreased Ankle Dorsiflexion on Swing  Gait Analysis Details  Gait speed: 1.02 feet per second with straight cane          Assessment & Plan   Assessment  Merline presents with a condition of Low complexity.   Presentation of Symptoms: Stable       Functional Limitations: Activity tolerance, Ambulating on uneven surfaces, Decreased ambulation distance/endurance, Functional mobility, Gait limitations, Increased risk of fall, Maintaining balance, Painful locomotion/ambulation, Participating in leisure activities, Performing household chores, Standing tolerance  Impairments:  Abnormal coordination, Abnormal gait, Abnormal or restricted range of motion, Impaired physical strength, Impaired balance    Prognosis: Good    Plan  From a physical therapy perspective, the patient would benefit from: Skilled Rehab Services    Planned therapy interventions include: Therapeutic exercise, Therapeutic activities, Neuromuscular re-education, and Manual therapy.    Planned modalities to include: Electrical stimulation - attended.        Visit Frequency: 2 times Per Week for 6 Weeks.       This plan was discussed with Patient.   Discussion participants: Agreed Upon Plan of Care             Patient's spiritual, cultural, and educational needs considered and patient agreeable to plan of care and goals.     Education  Education was done with Patient. The patient's learning style includes Listening. The patient Verbalizes understanding.              Goals:   Active       Long Term Goals        Patient will have increased B lower extremity hip muscle strength to greater than or equal to 4+/5.       Start:  03/10/25    Expected End:  04/18/25            Patient will have increased bilateral lower extremity ankle strength the greater than or equal to 3+/5 for improved gait mechanics.        Start:  03/10/25    Expected End:  04/18/25            Patient will independently ambulate with a straight cane for increased independence        Start:  03/10/25    Expected End:  04/18/25            Patient will have increased FOTO score to greater than or equal to 54 % indicating increased function.        Start:  03/10/25    Expected End:  04/18/25               Short Term Goals        Patient will independently complete home exercise program with correct form.         Start:  03/10/25    Expected End:  04/18/25            Patient will report decreased pain to less than or equal to 5/10 for improved quality of life.        Start:  03/10/25    Expected End:  04/18/25                Burke Dean, PT, DPT    Physician  Signature : ____________________________________________________  Date:_______________________________________________      Clinical Information for Insurance Authorization     Dates of Services: 3/11/2025to 4/18/2025  Discharge Plan: Patient will be discharged from skilled physical therapy treatment once all goals have been met, patient has plateaued, or physician/insurance requests discontinuation of care. Patient will be discharged with a home exercise program.   Type of therapy: Rehabilitative  ICD-10 Diagnosis Code(s): I96, S98.139A, R26.81  Which side is symptomatic? Right  Surgical: Yes  Surgical procedure:  transmetatarsal amputation  Surgery date:  2/18/2025 .  Presenting symptoms/diagnoses are unspecified in nature.  Presenting symptoms are non-radiating in nature.   The rehabilitation is not related to a diagnosis of cancer.  The rehabilitation is not related to a diagnosis of lymphedema.  Patient's clinical presentation is:  Severe objective and functional deficits: consistent intense symptoms with severe loss of range of motion, strength, or ability to perform daily tasks  CPT Codes Requested:  87571 [therapeutic exercise], 39514 [neuromuscular re-education], 33650 [gait training], 82248 [manual therapy], 55815 [therapeutic activities], and 70290 [attended electrical stimulation]                Current Participants as of 3/11/2025    Name Type Comments Contact Info    Spring Connell NP Referring Provider  [phone]    Signature pending    Burke Dean, PT, DPT Physical Therapist      Electronically signed by Burke Dean, PT, DPT at 3/11/2025 0860 CDT            Sincerely,      Burke Dean, PT, DPT  [facility]                                                            Dear Burke Dean, PT, DPT,    RE: Mr. Merline Ibarra, MRN: [id number]    I certify that I have reviewed the attached plan of care and agree to the details  within.        ___________________________  ___________________________  Provider Printed Name   Provider Signed Name      ___________________________  Date and Time

## 2025-03-10 NOTE — PROGRESS NOTES
Outpatient Rehab    Physical Therapy Evaluation (only)    Patient Name: Merline Ibarra  MRN: [id number]  YOB: 1980  Encounter Date: 3/10/2025    Therapy Diagnosis:   Encounter Diagnoses   Name Primary?    Toe gangrene     Amputation of one or more toes     Gait instability     Balance problem Yes    Weakness of both lower extremities      Physician: Srping Connell NP    Physician Orders: Eval and Treat  Medical Diagnosis: toe gangreen, ampuation of multiple toes, and gait instability    Visit # / Visits Authorized:  1 / 12   Date of Evaluation:  3/10/2025   Insurance Authorization Period: 2/25/2025 to 2/25/2026  Plan of Care Certification:  3/10/2025 to 4/18/2025      Time In: 1230   Time Out: 1250  Total Time: 20   Total Billable Time: 20 minutes     Intake Outcome Measure for FOTO Survey    Therapist reviewed FOTO scores for Merline Ibarra on 3/10/2025.   FOTO report - see Media section or FOTO account episode details.     Intake Score: 42%       Subjective   History of Present Illness  Merline is a 44 y.o. male who reports to physical therapy with a chief concern of R foot pain and decreased balance.     The patient reports a medical diagnosis of toe gangrene, amputation of multiple toes, gait instability. The patient has experienced this issue since 02/25/25. Patient reports a surgery of transmetatarsal amputation. Surgery occurred on 02/18/25. Diagnostic tests related to this condition: None.        History of Present Condition/Illness: Patient reports that he had his fourth and fifth toes on the R foot amputated two years ago. He then had the great toe amputated followed by transmetatarsal amputation of remaining toes on 2/18/2025. Patient reports having difficulty with his balance since surgery which is limiting his safety and independence with mobility tasks. He also reports pain in his R foot that is increased with increased activity levels making it difficult to stand to prepare his  own food at this time.     Activities of Daily Living  Social history was obtained from Patient.    General Prior Level of Function Comments: Independent  General Current Level of Function Comments: Patient reports independence at this time except cooking  Patient Responsibilities: Home management, Driving    Previously independent with activities of daily living? Yes     Currently independent with activities of daily living? Yes          Previously independent with instrumental activities of daily living? Yes     Currently independent with instrumental activities of daily living? No  Activities currently needing assistance include: Meal prep.            Pain     Patient reports a current pain level of 5/10. Pain at best is reported as 8/10. Pain at worst is reported as 8/10.   Location: R foot  Clinical Progression (since onset): Stable  Pain Qualities: Sharp, Aching, Throbbing  Pain-Relieving Factors: Medications - prescription, Activity modification, Rest  Pain-Aggravating Factors: Movement, Standing, Walking, Other (Comment)  Other Pain-Aggravating Factors: increased activity level such as with bathing and dressing tasks         Treatment History  Treatments  Discharged From Past 30 Days: Inpatient acute facility, Inpatient rehab facility  Previously Received Treatments: Yes  Previous Treatments: Physical therapy, Medications - prescription    Living Arrangements  Living Situation  Housing: Home independently  Living Arrangements: Alone  Support Systems: Family members, Friends/neighbors    Home Setup  Home Access: Stairs with rails  Entrance Stairs - Rails: Both  Number of Levels in Home: One level        Employment  Patient reports: Does the patient's condition impact their ability to work?  Employment Status: On disability          Past Medical History/Physical Systems Review:   Marceloelena MÉNDEZ Fred  has a past medical history of A-fib, CHF (congestive heart failure), Diabetes mellitus, and  Hypertension.    Merline Ibarra  has a past surgical history that includes Cardioversion and Foot surgery (Right).    Merline has a current medication list which includes the following prescription(s): amiodarone, ampicillin, aspirin, bd ultra-fine micro pen needle, freestyle lalo 3 reader, freestyle lalo 3 sensor, eliquis, gabapentin, hydrocodone-acetaminophen, lantus solostar u-100 insulin, metformin, metoprolol succinate, mounjaro, novolog flexpen u-100 insulin, pantoprazole, pen needle, diabetic, sulfamethoxazole-trimethoprim 800-160mg, and [DISCONTINUED] insulin nph-insulin regular (70/30).    Review of patient's allergies indicates:   Allergen Reactions    Ace inhibitors Anaphylaxis and Swelling    Lisinopril Swelling        Objective       Hip Range of Motion    Full bilateral hip AROM    Knee Range of Motion    Full bilateral knee active range of motion     Ankle/Foot Range of Motion      Patient has decreased bilateral ankle active range of motion due to weakness.               Hip Strength - Planes of Motion   Right Strength Right Pain Left Strength Left  Pain   Flexion (L2) 4-   4-     Extension 4-   4-     ABduction 4-   4-     ADduction 4-   4-     Internal Rotation 4-   4-     External Rotation 4-   4-         Knee Strength   Right Strength Right Pain Left Strength Left  Pain   Flexion (S2) 4-   4-     Prone Flexion 4-   4-     Extension (L3) 4-   4-            Ankle/Foot Strength - Planes of Motion   Right Strength Right Pain Left Strength Left  Pain   Dorsiflexion (L4) 2-   2-     Plantar Flexion (S1) 2-   2-     Inversion 2-   2-     Eversion 2-   2-     Great Toe Flexion 2-   2-     Great Toe Extension (L5) 2-   2-     Lesser Toes Flexion 2-   2-     Lesser Toes Extension 2-   2-            Transfers Assessment  Sit to Stand Assistance: Contact guard assist  Chair to Bed Assistance: Contact guard assist      Ambulation Assistance Required  Surface With  Assistive Device Without Assistive Device  Details   Level Contact guard assist Unable      Uneven Minimal assist Unable     Curb Minimal assist Unable       Stairs Assistance Required   Assistance Level Upper Extremity Support Pattern   Ascending Minimal assist Two rails Non-reciprocal   Descending Minimal assist Two rails Non-reciprocal        Gait Analysis  Walking Speed: Decreased    Right Side Walking Observations  Decreased: Stance Time  Right Foot Contact Pattern: Flat foot    Left Side Walking Observations  Decreased: Swing Time  Left Foot Contact Pattern: Flat foot  Ankle/Foot Observations During Gait  Left: Decreased Ankle Dorsiflexion on Swing  Gait Analysis Details  Gait speed: 1.02 feet per second with straight cane          Assessment & Plan   Assessment  Merline presents with a condition of Low complexity.   Presentation of Symptoms: Stable       Functional Limitations: Activity tolerance, Ambulating on uneven surfaces, Decreased ambulation distance/endurance, Functional mobility, Gait limitations, Increased risk of fall, Maintaining balance, Painful locomotion/ambulation, Participating in leisure activities, Performing household chores, Standing tolerance  Impairments: Abnormal coordination, Abnormal gait, Abnormal or restricted range of motion, Impaired physical strength, Impaired balance    Prognosis: Good    Plan  From a physical therapy perspective, the patient would benefit from: Skilled Rehab Services    Planned therapy interventions include: Therapeutic exercise, Therapeutic activities, Neuromuscular re-education, and Manual therapy.    Planned modalities to include: Electrical stimulation - attended.        Visit Frequency: 2 times Per Week for 6 Weeks.       This plan was discussed with Patient.   Discussion participants: Agreed Upon Plan of Care             Patient's spiritual, cultural, and educational needs considered and patient agreeable to plan of care and goals.     Education  Education was done with Patient. The patient's  learning style includes Listening. The patient Verbalizes understanding.              Goals:   Active       Long Term Goals        Patient will have increased B lower extremity hip muscle strength to greater than or equal to 4+/5.       Start:  03/10/25    Expected End:  04/18/25            Patient will have increased bilateral lower extremity ankle strength the greater than or equal to 3+/5 for improved gait mechanics.        Start:  03/10/25    Expected End:  04/18/25            Patient will independently ambulate with a straight cane for increased independence        Start:  03/10/25    Expected End:  04/18/25            Patient will have increased FOTO score to greater than or equal to 54 % indicating increased function.        Start:  03/10/25    Expected End:  04/18/25               Short Term Goals        Patient will independently complete home exercise program with correct form.         Start:  03/10/25    Expected End:  04/18/25            Patient will report decreased pain to less than or equal to 5/10 for improved quality of life.        Start:  03/10/25    Expected End:  04/18/25                Burke Dean PT, DPT    Physician Signature : ____________________________________________________  Date:_______________________________________________      Clinical Information for Insurance Authorization     Dates of Services: 3/11/2025to 4/18/2025  Discharge Plan: Patient will be discharged from skilled physical therapy treatment once all goals have been met, patient has plateaued, or physician/insurance requests discontinuation of care. Patient will be discharged with a home exercise program.   Type of therapy: Rehabilitative  ICD-10 Diagnosis Code(s): I96, S98.139A, R26.81  Which side is symptomatic? Right  Surgical: Yes  Surgical procedure:  transmetatarsal amputation  Surgery date:  2/18/2025 .  Presenting symptoms/diagnoses are unspecified in nature.  Presenting symptoms are non-radiating in nature.    The rehabilitation is not related to a diagnosis of cancer.  The rehabilitation is not related to a diagnosis of lymphedema.  Patient's clinical presentation is:  Severe objective and functional deficits: consistent intense symptoms with severe loss of range of motion, strength, or ability to perform daily tasks  CPT Codes Requested:  72116 [therapeutic exercise], 21481 [neuromuscular re-education], 78380 [gait training], 21707 [manual therapy], 59365 [therapeutic activities], and 06640 [attended electrical stimulation]

## 2025-03-12 ENCOUNTER — HOSPITAL ENCOUNTER (EMERGENCY)
Facility: HOSPITAL | Age: 45
Discharge: HOME | End: 2025-03-12
Attending: EMERGENCY MEDICINE
Payer: MEDICARE

## 2025-03-12 VITALS
WEIGHT: 221.38 LBS | BODY MASS INDEX: 29.99 KG/M2 | DIASTOLIC BLOOD PRESSURE: 83 MMHG | TEMPERATURE: 98 F | HEIGHT: 72 IN | OXYGEN SATURATION: 98 % | HEART RATE: 106 BPM | RESPIRATION RATE: 20 BRPM | SYSTOLIC BLOOD PRESSURE: 114 MMHG

## 2025-03-12 DIAGNOSIS — R07.9 CHEST PAIN: ICD-10-CM

## 2025-03-12 DIAGNOSIS — Z86.79 HISTORY OF ATRIAL FIBRILLATION: ICD-10-CM

## 2025-03-12 DIAGNOSIS — I48.3 TYPICAL ATRIAL FLUTTER: Primary | ICD-10-CM

## 2025-03-12 LAB
ALBUMIN SERPL BCP-MCNC: 3.6 G/DL (ref 3.5–5)
ALBUMIN/GLOB SERPL: 0.8 {RATIO}
ALP SERPL-CCNC: 63 U/L (ref 40–150)
ALT SERPL W P-5'-P-CCNC: 32 U/L
ANION GAP SERPL CALCULATED.3IONS-SCNC: 14 MMOL/L (ref 7–16)
AST SERPL W P-5'-P-CCNC: 23 U/L (ref 11–45)
BASOPHILS # BLD AUTO: 0.04 K/UL (ref 0–0.2)
BASOPHILS NFR BLD AUTO: 0.6 % (ref 0–1)
BILIRUB SERPL-MCNC: 0.5 MG/DL
BUN SERPL-MCNC: 9 MG/DL (ref 9–21)
BUN/CREAT SERPL: 8 (ref 6–20)
CALCIUM SERPL-MCNC: 9.9 MG/DL (ref 8.4–10.2)
CHLORIDE SERPL-SCNC: 110 MMOL/L (ref 98–107)
CO2 SERPL-SCNC: 23 MMOL/L (ref 22–29)
CREAT SERPL-MCNC: 1.12 MG/DL (ref 0.72–1.25)
D DIMER PPP FEU-MCNC: 0.3 ΜG/ML (ref 0–0.47)
DIFFERENTIAL METHOD BLD: ABNORMAL
EGFR (NO RACE VARIABLE) (RUSH/TITUS): 84 ML/MIN/1.73M2
EOSINOPHIL # BLD AUTO: 0.06 K/UL (ref 0–0.5)
EOSINOPHIL NFR BLD AUTO: 1 % (ref 1–4)
ERYTHROCYTE [DISTWIDTH] IN BLOOD BY AUTOMATED COUNT: 13.2 % (ref 11.5–14.5)
GLOBULIN SER-MCNC: 4.3 G/DL (ref 2–4)
GLUCOSE SERPL-MCNC: 192 MG/DL (ref 70–105)
GLUCOSE SERPL-MCNC: 205 MG/DL (ref 74–100)
HCT VFR BLD AUTO: 42.9 % (ref 40–54)
HGB BLD-MCNC: 15.6 G/DL (ref 13.5–18)
IMM GRANULOCYTES # BLD AUTO: 0.03 K/UL (ref 0–0.04)
IMM GRANULOCYTES NFR BLD: 0.5 % (ref 0–0.4)
INR BLD: 1.1
LYMPHOCYTES # BLD AUTO: 2.76 K/UL (ref 1–4.8)
LYMPHOCYTES NFR BLD AUTO: 44.7 % (ref 27–41)
MCH RBC QN AUTO: 31 PG (ref 27–31)
MCHC RBC AUTO-ENTMCNC: 36.4 G/DL (ref 32–36)
MCV RBC AUTO: 85.3 FL (ref 80–96)
MONOCYTES # BLD AUTO: 0.45 K/UL (ref 0–0.8)
MONOCYTES NFR BLD AUTO: 7.3 % (ref 2–6)
MPC BLD CALC-MCNC: 10.6 FL (ref 9.4–12.4)
NEUTROPHILS # BLD AUTO: 2.83 K/UL (ref 1.8–7.7)
NEUTROPHILS NFR BLD AUTO: 45.9 % (ref 53–65)
NRBC # BLD AUTO: 0 X10E3/UL
NRBC, AUTO (.00): 0 %
PLATELET # BLD AUTO: 285 K/UL (ref 150–400)
POTASSIUM SERPL-SCNC: 3.6 MMOL/L (ref 3.5–5.1)
PROT SERPL-MCNC: 7.9 G/DL (ref 6.4–8.3)
PROTHROMBIN TIME: 14.1 SECONDS (ref 11.7–14.7)
RBC # BLD AUTO: 5.03 M/UL (ref 4.6–6.2)
SODIUM SERPL-SCNC: 143 MMOL/L (ref 136–145)
TROPONIN I SERPL HS-MCNC: <2.7 NG/L
TROPONIN I SERPL HS-MCNC: <2.7 NG/L
WBC # BLD AUTO: 6.17 K/UL (ref 4.5–11)

## 2025-03-12 PROCEDURE — 84484 ASSAY OF TROPONIN QUANT: CPT | Performed by: EMERGENCY MEDICINE

## 2025-03-12 PROCEDURE — 99284 EMERGENCY DEPT VISIT MOD MDM: CPT | Performed by: EMERGENCY MEDICINE

## 2025-03-12 PROCEDURE — 99285 EMERGENCY DEPT VISIT HI MDM: CPT | Mod: 25

## 2025-03-12 PROCEDURE — 85379 FIBRIN DEGRADATION QUANT: CPT | Performed by: EMERGENCY MEDICINE

## 2025-03-12 PROCEDURE — 80053 COMPREHEN METABOLIC PANEL: CPT | Performed by: EMERGENCY MEDICINE

## 2025-03-12 PROCEDURE — 85025 COMPLETE CBC W/AUTO DIFF WBC: CPT | Performed by: EMERGENCY MEDICINE

## 2025-03-12 PROCEDURE — 85610 PROTHROMBIN TIME: CPT | Performed by: EMERGENCY MEDICINE

## 2025-03-12 PROCEDURE — 93010 ELECTROCARDIOGRAM REPORT: CPT | Mod: ,,, | Performed by: INTERNAL MEDICINE

## 2025-03-12 PROCEDURE — 96361 HYDRATE IV INFUSION ADD-ON: CPT

## 2025-03-12 PROCEDURE — 94761 N-INVAS EAR/PLS OXIMETRY MLT: CPT

## 2025-03-12 PROCEDURE — 82962 GLUCOSE BLOOD TEST: CPT

## 2025-03-12 PROCEDURE — 25000003 PHARM REV CODE 250: Performed by: EMERGENCY MEDICINE

## 2025-03-12 PROCEDURE — 96374 THER/PROPH/DIAG INJ IV PUSH: CPT

## 2025-03-12 PROCEDURE — 93005 ELECTROCARDIOGRAM TRACING: CPT

## 2025-03-12 RX ORDER — SERTRALINE HYDROCHLORIDE 50 MG/1
50 TABLET, FILM COATED ORAL DAILY
COMMUNITY
Start: 2025-02-23 | End: 2025-03-12

## 2025-03-12 RX ORDER — DILTIAZEM HYDROCHLORIDE 5 MG/ML
20 INJECTION INTRAVENOUS
Status: COMPLETED | OUTPATIENT
Start: 2025-03-12 | End: 2025-03-12

## 2025-03-12 RX ORDER — INSULIN LISPRO 100 [IU]/ML
12 INJECTION, SOLUTION INTRAVENOUS; SUBCUTANEOUS
COMMUNITY
Start: 2025-02-22

## 2025-03-12 RX ORDER — METOPROLOL TARTRATE 50 MG/1
50 TABLET ORAL 2 TIMES DAILY
COMMUNITY
Start: 2025-02-22 | End: 2025-03-24

## 2025-03-12 RX ORDER — TIRZEPATIDE 5 MG/.5ML
5 INJECTION, SOLUTION SUBCUTANEOUS
COMMUNITY
Start: 2025-02-12

## 2025-03-12 RX ORDER — HYDROCODONE BITARTRATE AND ACETAMINOPHEN 10; 325 MG/1; MG/1
1 TABLET ORAL EVERY 8 HOURS PRN
COMMUNITY
Start: 2025-03-06 | End: 2025-04-05

## 2025-03-12 RX ORDER — AMIODARONE HYDROCHLORIDE 200 MG/1
200 TABLET ORAL 2 TIMES DAILY
COMMUNITY
Start: 2025-02-22 | End: 2025-03-24

## 2025-03-12 RX ORDER — INSULIN GLARGINE 100 [IU]/ML
36 INJECTION, SOLUTION SUBCUTANEOUS NIGHTLY
COMMUNITY
Start: 2025-02-22 | End: 2025-03-24

## 2025-03-12 RX ADMIN — DILTIAZEM HYDROCHLORIDE 20 MG: 5 INJECTION, SOLUTION INTRAVENOUS at 01:03

## 2025-03-12 RX ADMIN — SODIUM CHLORIDE 1000 ML: 9 INJECTION, SOLUTION INTRAVENOUS at 01:03

## 2025-03-12 NOTE — ED PROVIDER NOTES
Encounter Date: 3/12/2025       History     Chief Complaint   Patient presents with    Chest Pain     Patient presents with shortness of breath that started this morning upon awakening, along with chest pressure/tightness.  Reports his heart seemed to be beating fast.  Has a history of atrial fibrillation, he is on Eliquis, amiodarone, and metoprolol.  Patient reports he has been taking his medication as directed.      Review of patient's allergies indicates:   Allergen Reactions    Ace inhibitors Anaphylaxis and Swelling    Lisinopril Swelling     Past Medical History:   Diagnosis Date    A-fib     CHF (congestive heart failure)     Diabetes mellitus     Hypertension      Past Surgical History:   Procedure Laterality Date    CARDIOVERSION      FOOT SURGERY Right      Family History   Problem Relation Name Age of Onset    Diabetes Mother      Heart disease Mother      Diabetes Father      Heart disease Father       Social History[1]  Review of Systems   Constitutional: Negative.  Negative for activity change, appetite change, chills, diaphoresis, fatigue and fever.   HENT: Negative.     Eyes: Negative.    Respiratory:  Positive for shortness of breath. Negative for apnea, cough, choking, chest tightness, wheezing and stridor.    Cardiovascular:  Positive for chest pain and palpitations. Negative for leg swelling.   Gastrointestinal: Negative.    Genitourinary: Negative.    Musculoskeletal: Negative.    Skin: Negative.    Neurological: Negative.    Psychiatric/Behavioral: Negative.     All other systems reviewed and are negative.      Physical Exam     Initial Vitals [03/12/25 1346]   BP Pulse Resp Temp SpO2   124/89 (!) 137 20 98.2 °F (36.8 °C) 97 %      MAP       --         Physical Exam    Nursing note and vitals reviewed.  Constitutional: He appears well-developed and well-nourished. No distress.   HENT:   Right Ear: External ear normal.   Left Ear: External ear normal.   Nose: Nose normal. Mouth/Throat:  Oropharynx is clear and moist.   Eyes: Conjunctivae and EOM are normal. Pupils are equal, round, and reactive to light. Right eye exhibits no discharge. Left eye exhibits no discharge. No scleral icterus.   Neck: Neck supple. No JVD present.   Normal range of motion.  Cardiovascular:  Regular rhythm, normal heart sounds and intact distal pulses.   Tachycardia present.         No murmur heard.  Pulmonary/Chest: Breath sounds normal. No stridor. No respiratory distress. He has no wheezes. He has no rhonchi. He has no rales.   Abdominal: Abdomen is soft. Bowel sounds are normal.   Musculoskeletal:         General: No tenderness or edema. Normal range of motion.      Cervical back: Normal range of motion and neck supple.      Comments: Patient has amputation of the toes of the right foot with a bandage in place.     Lymphadenopathy:     He has no cervical adenopathy.   Neurological: He is alert and oriented to person, place, and time. He has normal strength. No cranial nerve deficit. GCS score is 15. GCS eye subscore is 4. GCS verbal subscore is 5. GCS motor subscore is 6.   Skin: Skin is warm and dry. Capillary refill takes less than 2 seconds. No rash noted. No erythema. There is pallor.   Psychiatric: He has a normal mood and affect. His behavior is normal.         Medical Screening Exam   See Full Note    ED Course   Procedures  Labs Reviewed   COMPREHENSIVE METABOLIC PANEL - Abnormal       Result Value    Sodium 143      Potassium 3.6      Chloride 110 (*)     CO2 23      Anion Gap 14      Glucose 205 (*)     BUN 9      Creatinine 1.12      BUN/Creatinine Ratio 8      Calcium 9.9      Total Protein 7.9      Albumin 3.6      Globulin 4.3 (*)     A/G Ratio 0.8      Bilirubin, Total 0.5      Alk Phos 63      ALT 32      AST 23      eGFR 84     CBC WITH DIFFERENTIAL - Abnormal    WBC 6.17      RBC 5.03      Hemoglobin 15.6      Hematocrit 42.9      MCV 85.3      MCH 31.0      MCHC 36.4 (*)     RDW 13.2      Platelet  Count 285      MPV 10.6      Neutrophils % 45.9 (*)     Lymphocytes % 44.7 (*)     Monocytes % 7.3 (*)     Eosinophils % 1.0      Basophils % 0.6      Immature Granulocytes % 0.5 (*)     nRBC, Auto 0.0      Neutrophils, Abs 2.83      Lymphocytes, Absolute 2.76      Monocytes, Absolute 0.45      Eosinophils, Absolute 0.06      Basophils, Absolute 0.04      Immature Granulocytes, Absolute 0.03      nRBC, Absolute 0.00      Diff Type Auto     POCT GLUCOSE MONITORING CONTINUOUS - Abnormal    POC Glucose 192 (*)    TROPONIN I - Normal    Troponin I High Sensitivity <2.7     PROTIME-INR - Normal    PT 14.1      INR 1.10     D DIMER, QUANTITATIVE - Normal    D-Dimer 0.30     TROPONIN I - Normal    Troponin I High Sensitivity <2.7     CBC W/ AUTO DIFFERENTIAL    Narrative:     The following orders were created for panel order CBC auto differential.  Procedure                               Abnormality         Status                     ---------                               -----------         ------                     CBC with Differential[[id number]]       Abnormal            Final result                 Please view results for these tests on the individual orders.          Imaging Results              X-Ray Chest AP Portable (Final result)  Result time 03/12/25 15:11:44      Final result by Renan Kent MD (03/12/25 15:11:44)                   Impression:      No acute chest disease.      Electronically signed by: Renan Kent  Date:    03/12/2025  Time:    15:11               Narrative:    EXAMINATION:  XR CHEST AP PORTABLE    CLINICAL HISTORY:  Chest Pain;    TECHNIQUE:  Portable view of the chest was performed.    COMPARISON:  09/18/2024.    FINDINGS:  Lungs are clear.  No focal consolidation.  Heart size normal.  Mediastinal contours unremarkable.  Trachea midline.    Bony thorax intact.                                       Medications   sodium chloride 0.9% bolus 1,000 mL 1,000 mL (0 mLs Intravenous  Stopped 3/12/25 1505)   diltiaZEM injection 20 mg (20 mg Intravenous Given 3/12/25 1353)     Medical Decision Making  Differential diagnosis includes acute coronary syndrome, pulmonary embolism, uncontrolled atrial fibrillation/atrial flutter, electrolyte abnormality, thyroid abnormality.    Patient was given diltiazem 20 mg IV, heart rate improved.  Initial troponin is normal, awaiting repeat troponin.  Patient remains stable.    Patient had serial troponin levels which remained normal.  Patient is rate controlled with his history of atrial fibrillation.  Patient to continue his Eliquis and other medications.  Patient advised to contact his cardiologist and schedule an appointment this week to recheck and adjust medications as needed.  Discharge and follow up instructions given.        Amount and/or Complexity of Data Reviewed  Labs: ordered. Decision-making details documented in ED Course.  Radiology: ordered. Decision-making details documented in ED Course.    Risk  Prescription drug management.               ED Course as of 03/12/25 1648   Wed Mar 12, 2025   1452 D dimer, quantitative  D-dimer is normal [LM]   1452 Protime-INR  PT INR is normal [LM]   1452 Troponin I  Troponin is less than 2.7 [LM]   1452 POCT glucose(!)  Fingerstick blood sugar on arrival is 192 mg/dL. [LM]   1452 CBC auto differential(!)  CBC shows normal white blood cell count, normal hemoglobin and hematocrit, normal platelet count. [LM]   1452 Comprehensive metabolic panel(!)  CMP shows elevated glucose 205, chloride 110, total globulin 4.3, otherwise normal. [LM]   1452 X-Ray Chest AP Portable  Review of portable chest x-ray shows clear lung fields, and normal heart size.  No acute process seen. [LM]   1522 X-Ray Chest AP Portable  Review of radiologist's report for portable chest x-ray indicates no acute disease in the chest by x-ray. [LM]   1644 Troponin I  Repeat troponin remains less than 2.7. [LM]      ED Course User Index  [LM]  Hay Gaston DO                           Clinical Impression:   Final diagnoses:  [R07.9] Chest pain  [I48.3] Typical atrial flutter - With rapid rate, now controlled (Primary)  [Z86.79] History of atrial fibrillation        ED Disposition Condition    Discharge Stable          ED Prescriptions    None       Follow-up Information       Follow up With Specialties Details Why Contact Info    Marley Garcia NP Geriatric Medicine Schedule an appointment as soon as possible for a visit in 1 day To recheck; sooner if worse, not improving, or if any new symptoms. [address] [place] [id number] [phone]      Your cardiologist  Schedule an appointment as soon as possible for a visit in 2 days To recheck and to discuss medication adjustment if needed due to breakthrough atrial flutter/atrial fibrillation with rapid rate.                [1]   Social History  Tobacco Use    Smoking status: Former    Smokeless tobacco: Never   Substance Use Topics    Alcohol use: Yes     Comment: OCCASIONALLY     Drug use: Yes     Types: Marijuana        Hay Gaston DO  03/12/25 [id number]

## 2025-03-12 NOTE — ED TRIAGE NOTES
Pt c/o chest pain with sob that started this morning upon waking. Pt with hx right partial foot amputation on 2/18/25.

## 2025-03-17 ENCOUNTER — CLINICAL SUPPORT (OUTPATIENT)
Dept: REHABILITATION | Facility: HOSPITAL | Age: 45
End: 2025-03-17
Payer: MEDICARE

## 2025-03-17 DIAGNOSIS — R29.898 WEAKNESS OF BOTH LOWER EXTREMITIES: ICD-10-CM

## 2025-03-17 DIAGNOSIS — R26.89 BALANCE PROBLEM: Primary | ICD-10-CM

## 2025-03-17 PROCEDURE — 97112 NEUROMUSCULAR REEDUCATION: CPT

## 2025-03-17 PROCEDURE — 97110 THERAPEUTIC EXERCISES: CPT

## 2025-03-17 NOTE — PROGRESS NOTES
Outpatient Rehab    Physical Therapy Visit    Patient Name: Merline Ibarra  MRN: [id number]  YOB: 1980  Encounter Date: 3/17/2025    Therapy Diagnosis:   Encounter Diagnoses   Name Primary?    Balance problem Yes    Weakness of both lower extremities      Physician: Spring Connell NP    Physician Orders: Eval and Treat  Medical Diagnosis: toe gangreen, ampuation of multiple toes, and gait instability     Visit # / Visits Authorized:     Date of Evaluation:  3/10/2025   Insurance Authorization Period: 2025 to 2026  Plan of Care Certification:  3/10/2025 to 2025       PT/PTA: PT   Number of PTA visits since last PT visit:0  Time In: 1317   Time Out: 1347  Total Time: 30   Total Billable Time: 30 minutes    FOTO:  Intake Score:  %  Survey Score 1:  %  Survey Score 2:  %         Subjective   Pt reports feeling okay so far today. Reports compliance with HEP..         Objective            Treatment:  Therapeutic Exercise  TE 1: nustep: 8 minutes  TE 2: gastroc stretch: 2 min  TE 3: bridge: 20  TE 4: hooklying clamshells: 20, green band  Balance/Neuromuscular Re-Education  NMR 1: ant-post weight shift: 20  NMR 2: lateral weight shift: 20  NMR 3: stationary marchin  NMR 4: LAQ: 20 x 3 sec hold, each LE, 2 lb cuff weight    Time Entry(in minutes):  Neuromuscular Re-Education Time Entry: 16  Therapeutic Exercise Time Entry: 14    Assessment & Plan   Assessment: Pt attending first follow up visit since IE. He is able to tolerate about 30 min session prior to needing to stop due to fatigue in the BLE. He is able to tolerate addition of some new LE stretching and strengthening during today's session. Prolonged standing did seem to tire pt relatively quickly, and he did report that the Nustep was more than he does at home on regular basis. Will continue to progress as able and as tolerated in coming visits.       Patient will continue to benefit from skilled outpatient physical  therapy to address the deficits listed in the problem list box on initial evaluation, provide pt/family education and to maximize pt's level of independence in the home and community environment.     Patient's spiritual, cultural, and educational needs considered and patient agreeable to plan of care and goals.           Plan: Continue with current treatment plan and progress as able/tolerated.    Goals:   Active       Long Term Goals        Patient will have increased B lower extremity hip muscle strength to greater than or equal to 4+/5.       Start:  03/10/25    Expected End:  04/18/25            Patient will have increased bilateral lower extremity ankle strength the greater than or equal to 3+/5 for improved gait mechanics.        Start:  03/10/25    Expected End:  04/18/25            Patient will independently ambulate with a straight cane for increased independence        Start:  03/10/25    Expected End:  04/18/25            Patient will have increased FOTO score to greater than or equal to 54 % indicating increased function.        Start:  03/10/25    Expected End:  04/18/25               Short Term Goals        Patient will independently complete home exercise program with correct form.         Start:  03/10/25    Expected End:  04/18/25            Patient will report decreased pain to less than or equal to 5/10 for improved quality of life.        Start:  03/10/25    Expected End:  04/18/25                Justin Song, PT, DPT  3/17/2025

## 2025-03-20 ENCOUNTER — CLINICAL SUPPORT (OUTPATIENT)
Dept: REHABILITATION | Facility: HOSPITAL | Age: 45
End: 2025-03-20
Payer: MEDICARE

## 2025-03-20 DIAGNOSIS — R29.898 WEAKNESS OF BOTH LOWER EXTREMITIES: ICD-10-CM

## 2025-03-20 DIAGNOSIS — R26.89 BALANCE PROBLEM: Primary | ICD-10-CM

## 2025-03-20 PROCEDURE — 97110 THERAPEUTIC EXERCISES: CPT

## 2025-03-20 PROCEDURE — 97112 NEUROMUSCULAR REEDUCATION: CPT

## 2025-03-20 NOTE — PROGRESS NOTES
Outpatient Rehab    Physical Therapy Visit    Patient Name: Merline Ibarra  MRN: [id number]  YOB: 1980  Encounter Date: 3/20/2025    Therapy Diagnosis:   Encounter Diagnoses   Name Primary?    Balance problem Yes    Weakness of both lower extremities      Physician: Spring Connell NP    Physician Orders: Eval and Treat  Medical Diagnosis: Toe gangrene  Amputation of one or more toes  Gait instability    Visit # / Visits Authorized:  3 / 12   Date of Evaluation:  3/10/2025   Insurance Authorization Period: 2025 to 2026  Plan of Care Certification:  3/10/2025 to 2025       PT/PTA: PT   Number of PTA visits since last PT visit:0  Time In: 1230   Time Out: 1300  Total Time: 30   Total Billable Time: 30 minutes    FOTO:  Intake Score:  %  Survey Score 1:  %  Survey Score 2:  %         Subjective   Pt reports soreness after previous treatment session..         Objective            Treatment:  Therapeutic Exercise  TE 1: nustep: 8 minutes  TE 3: bridge: 20  TE 4: hooklying clamshells: 20, green band  TE 5: sidelying hip abduction: 2 x 10, each LE  TE 6: supine alternating marchin, each LE  Balance/Neuromuscular Re-Education  NMR 1: SLR: 2 x 10, each LE  NMR 2: SAQ: 20 x 3 sec hold  NMR 4: LAQ: 20 x 3 sec hold, each LE, 3 lb cuff weight    Time Entry(in minutes):  Neuromuscular Re-Education Time Entry: 12  Therapeutic Exercise Time Entry: 18    Assessment & Plan   Assessment: Held on standing activities today due to pt's fatigue and soreness from last session. Instead, performed more plinth-based activities for LE strengthening with good tolerance. Will continue to progress as able and as tolerated in coming visits.       Patient will continue to benefit from skilled outpatient physical therapy to address the deficits listed in the problem list box on initial evaluation, provide pt/family education and to maximize pt's level of independence in the home and community environment.      Patient's spiritual, cultural, and educational needs considered and patient agreeable to plan of care and goals.           Plan: Continue with current treatment plan and progress as able/tolerated.    Goals:   Active       Long Term Goals        Patient will have increased B lower extremity hip muscle strength to greater than or equal to 4+/5.       Start:  03/10/25    Expected End:  04/18/25            Patient will have increased bilateral lower extremity ankle strength the greater than or equal to 3+/5 for improved gait mechanics.        Start:  03/10/25    Expected End:  04/18/25            Patient will independently ambulate with a straight cane for increased independence        Start:  03/10/25    Expected End:  04/18/25            Patient will have increased FOTO score to greater than or equal to 54 % indicating increased function.        Start:  03/10/25    Expected End:  04/18/25               Short Term Goals        Patient will independently complete home exercise program with correct form.         Start:  03/10/25    Expected End:  04/18/25            Patient will report decreased pain to less than or equal to 5/10 for improved quality of life.        Start:  03/10/25    Expected End:  04/18/25                Justin Song, PT, DPT  3/20/2025

## 2025-03-21 LAB
OHS QRS DURATION: 100 MS
OHS QTC CALCULATION: 574 MS

## 2025-03-25 ENCOUNTER — CLINICAL SUPPORT (OUTPATIENT)
Dept: REHABILITATION | Facility: HOSPITAL | Age: 45
End: 2025-03-25
Payer: MEDICARE

## 2025-03-25 DIAGNOSIS — R26.89 BALANCE PROBLEM: Primary | ICD-10-CM

## 2025-03-25 DIAGNOSIS — R26.81 GAIT INSTABILITY: ICD-10-CM

## 2025-03-25 DIAGNOSIS — R29.898 WEAKNESS OF BOTH LOWER EXTREMITIES: ICD-10-CM

## 2025-03-25 DIAGNOSIS — I96 TOE GANGRENE: ICD-10-CM

## 2025-03-25 PROCEDURE — 97530 THERAPEUTIC ACTIVITIES: CPT | Mod: CQ

## 2025-03-25 PROCEDURE — 97110 THERAPEUTIC EXERCISES: CPT | Mod: CQ

## 2025-03-25 NOTE — PROGRESS NOTES
"  Outpatient Rehab    Physical Therapy Visit    Patient Name: Mreline Ibarra  MRN: [id number]  YOB: 1980  Encounter Date: 3/25/2025    Therapy Diagnosis:   No diagnosis found.    Physician: Spring Connell NP    Physician Orders: Eval and Treat  Medical Diagnosis: Toe gangrene  Amputation of one or more toes  Gait instability    Visit # / Visits Authorized:  4/ 12   Date of Evaluation:  3/10/2025   Insurance Authorization Period: 2/25/2025 to 2/25/2026  Plan of Care Certification:  3/10/2025 to 4/18/2025       PT/PTA:   PTA  Number of PTA visits since last PT visit: 1/5  Time In:   10:34  Time Out:  11:15  Total Time:   39 minutes   Total Billable Time: 39 minutes    FOTO:  Intake Score:  %  Survey Score 1:  %  Survey Score 2:  %         Subjective   Patient arrives ambulating with RW and wear protective walking shoe on Right LE.  Patient does not report new complaints..  Family / care giver present for this visit:   Pain reported as 0/10.      Objective            Treatment:  Therapeutic Exercise  TE 1: nustep: 8 minutes  TE 2: Seated isometric hip adduction with ball 30 x 3"  TE 3: SEated hip abduction with Thera Band 30 x  TE 4: SEated Hamstring curls 2 x 10 with Thera band  TE 7: Seated Marching with Thera Band 2 x 10  Balance/Neuromuscular Re-Education  NMR 1: STanding static balance training at RW with touching assist as needed only: head turns L<>R x 10; cervical flexion/extension x 10  Therapeutic Activity  TA 1: Sit to stands using arms to assist to RW 2 x 5  TA 2: Standing march bilateral LE's 2 x 10 at WALKER  TA 3: Mini squats x 10 with patient standing at walker    Time Entry(in minutes):       Assessment & Plan   Assessment: Patient requires mod cues x 3 to complete standing activities.  No patient requires use of RW to maintain static balance.  Patient seems to be easily fatigued, and requires multiple rest breaks throughout PT treatment session.       Patient will continue to benefit " from skilled outpatient physical therapy to address the deficits listed in the problem list box on initial evaluation, provide pt/family education and to maximize pt's level of independence in the home and community environment.     Patient's spiritual, cultural, and educational needs considered and patient agreeable to plan of care and goals.           Plan: Continue with current treatment plan and progress as able/tolerated.    Goals:   Active       Long Term Goals        Patient will have increased B lower extremity hip muscle strength to greater than or equal to 4+/5.       Start:  03/10/25    Expected End:  04/18/25            Patient will have increased bilateral lower extremity ankle strength the greater than or equal to 3+/5 for improved gait mechanics.        Start:  03/10/25    Expected End:  04/18/25            Patient will independently ambulate with a straight cane for increased independence        Start:  03/10/25    Expected End:  04/18/25            Patient will have increased FOTO score to greater than or equal to 54 % indicating increased function.        Start:  03/10/25    Expected End:  04/18/25               Short Term Goals        Patient will independently complete home exercise program with correct form.         Start:  03/10/25    Expected End:  04/18/25            Patient will report decreased pain to less than or equal to 5/10 for improved quality of life.        Start:  03/10/25    Expected End:  04/18/25                Nicol Murphy, MYRA  3/25/2025

## 2025-03-28 ENCOUNTER — CLINICAL SUPPORT (OUTPATIENT)
Dept: REHABILITATION | Facility: HOSPITAL | Age: 45
End: 2025-03-28
Payer: MEDICARE

## 2025-03-28 DIAGNOSIS — R26.89 BALANCE PROBLEM: Primary | ICD-10-CM

## 2025-03-28 DIAGNOSIS — R29.898 WEAKNESS OF BOTH LOWER EXTREMITIES: ICD-10-CM

## 2025-03-28 PROCEDURE — 97110 THERAPEUTIC EXERCISES: CPT

## 2025-03-28 PROCEDURE — 97112 NEUROMUSCULAR REEDUCATION: CPT

## 2025-03-28 NOTE — PROGRESS NOTES
"  Outpatient Rehab    Physical Therapy Visit    Patient Name: Merline Ibarra  MRN: [id number]  YOB: 1980  Encounter Date: 3/28/2025    Therapy Diagnosis:   Encounter Diagnoses   Name Primary?    Balance problem Yes    Weakness of both lower extremities      Physician: Spring Connell NP    Physician Orders: Eval and Treat  Medical Diagnosis: Toe gangrene  Amputation of one or more toes  Gait instability    Visit # / Visits Authorized:    Date of Evaluation:  3/10/2025   Insurance Authorization Period: 2025 to 2026  Plan of Care Certification:  3/10/2025 to 2025       PT/PTA: PT   Number of PTA visits since last PT visit:0  Time In: 1104   Time Out: 1134  Total Time: 30   Total Billable Time: 30    FOTO:  Intake Score:  %  Survey Score 1:  %  Survey Score 2:  %         Subjective   Pt reports "overdoing" it yesterday due to walking about a half mile. Says the R foot is painful today as a result of all the walking yesterday..  Pain reported as 7/10.      Objective            Treatment:  Therapeutic Exercise  TE 1: nustep: 8 minutes, lvl 5  TE 2: hooklying isometric hip adduction with ball 20 x 3"  TE 3: seated hip abduction, with theraband: 20, green band  TE 4: seated hamstring curls: 2 x 10, green band  TE 5: sidelying hip abduction: 2 x 10, each LE  TE 6: bridge: 20  TE 7: supine resisted D2 extension for LE's: 2 x 10, each LE  Balance/Neuromuscular Re-Education  NMR 2: standing hip abduction and extension, each LE: 15 each  NMR 3: stationary marchin  NMR 4: SAQ: 20 x 3 sec hold    Time Entry(in minutes):  Neuromuscular Re-Education Time Entry: 12  Therapeutic Exercise Time Entry: 18    Assessment & Plan   Assessment: Continued with both standing and plinth-based activities today for balance training and strengthening to the LE's. Added in new LE resisted PNF for compound movement strengthening to the LE's in weight-minimized position. Pt tolerated the treatment session " "well but did report his legs feeling like "jelly" afterward. Will continue to progress as able and as tolerated.       Patient will continue to benefit from skilled outpatient physical therapy to address the deficits listed in the problem list box on initial evaluation, provide pt/family education and to maximize pt's level of independence in the home and community environment.     Patient's spiritual, cultural, and educational needs considered and patient agreeable to plan of care and goals.           Plan: Continue with current treatment plan and progress as able/tolerated.    Goals:   Active       Long Term Goals        Patient will have increased B lower extremity hip muscle strength to greater than or equal to 4+/5.       Start:  03/10/25    Expected End:  04/18/25            Patient will have increased bilateral lower extremity ankle strength the greater than or equal to 3+/5 for improved gait mechanics.        Start:  03/10/25    Expected End:  04/18/25            Patient will independently ambulate with a straight cane for increased independence        Start:  03/10/25    Expected End:  04/18/25            Patient will have increased FOTO score to greater than or equal to 54 % indicating increased function.        Start:  03/10/25    Expected End:  04/18/25               Short Term Goals        Patient will independently complete home exercise program with correct form.         Start:  03/10/25    Expected End:  04/18/25            Patient will report decreased pain to less than or equal to 5/10 for improved quality of life.        Start:  03/10/25    Expected End:  04/18/25                Justin Song PT, DPT  3/28/2025   "

## 2025-03-31 ENCOUNTER — CLINICAL SUPPORT (OUTPATIENT)
Dept: REHABILITATION | Facility: HOSPITAL | Age: 45
End: 2025-03-31
Payer: MEDICARE

## 2025-03-31 DIAGNOSIS — R26.89 BALANCE PROBLEM: Primary | ICD-10-CM

## 2025-03-31 PROCEDURE — 97110 THERAPEUTIC EXERCISES: CPT

## 2025-03-31 PROCEDURE — 97112 NEUROMUSCULAR REEDUCATION: CPT

## 2025-03-31 NOTE — PROGRESS NOTES
"  Outpatient Rehab    Physical Therapy Visit    Patient Name: Merline Ibarra  MRN: [id number]  YOB: 1980  Encounter Date: 3/31/2025    Therapy Diagnosis:   Encounter Diagnosis   Name Primary?    Balance problem Yes     Physician: Spring Connell NP    Physician Orders: Eval and Treat  Medical Diagnosis: Toe gangrene  Amputation of one or more toes  Gait instability    Visit # / Visits Authorized:    Insurance Authorization Period: 3/10/2025 to 2025  Date of Evaluation:  3/10/2025   Plan of Care Certification:  3/10/2025 to 2025       PT/PTA:   NA   Number of PTA visits since last PT visit: 0  Time In: 1058   Time Out: 1131  Total Time: 33   Total Billable Time:  33    FOTO:  Intake Score: 42 %  Survey Score 1:  %  Survey Score 2:  %         Subjective   "I am sore. I started working out at the gym.".  Pain reported as 6/10.      Objective        No objective measures taken today.     Treatment:  Therapeutic Exercise  TE 1: nustep: 8 minutes, lvl 5  TE 3: seated hip abduction, with theraband: 20, green band  TE 4: seated hamstring curls: 2 x 10, green band  TE 5: Seated marching 2 x 10 each with green band  TE 6: Clamshells 2 x 10 each green badn  Balance/Neuromuscular Re-Education  NMR 1: Mini squats 2 x 10  NMR 2: brideg with ADD isometric 2 x 10 3"  NMR 3: stationary marchin with focus on ankle DF during hip and knee flexion  NMR 4: LAQ 2 x 10 2# each  NMR 5: SLR 2 x 10 each LE    Time Entry(in minutes):  Neuromuscular Re-Education Time Entry: 12  Therapeutic Exercise Time Entry: 21    Assessment & Plan   Assessment: Patient required occcasional rest breaks during therapeutic tasks due to fatigue. PT focused on increased motor control wuth therapeutic tasks to increase effectiveness. Patient had no reports of adverse effects to therapy treatment.  Evaluation/Treatment Tolerance: Patient tolerated treatment well    Patient will continue to benefit from skilled outpatient " physical therapy to address the deficits listed in the problem list box on initial evaluation, provide pt/family education and to maximize pt's level of independence in the home and community environment.     Patient's spiritual, cultural, and educational needs considered and patient agreeable to plan of care and goals.           Plan: Continue with current treatment plan and progress as able/tolerated.    Goals:   Active       Long Term Goals        Patient will have increased B lower extremity hip muscle strength to greater than or equal to 4+/5.       Start:  03/10/25    Expected End:  04/18/25            Patient will have increased bilateral lower extremity ankle strength the greater than or equal to 3+/5 for improved gait mechanics.        Start:  03/10/25    Expected End:  04/18/25            Patient will independently ambulate with a straight cane for increased independence        Start:  03/10/25    Expected End:  04/18/25            Patient will have increased FOTO score to greater than or equal to 54 % indicating increased function.        Start:  03/10/25    Expected End:  04/18/25               Short Term Goals        Patient will independently complete home exercise program with correct form.         Start:  03/10/25    Expected End:  04/18/25            Patient will report decreased pain to less than or equal to 5/10 for improved quality of life.        Start:  03/10/25    Expected End:  04/18/25                Burke Dean, PT, DPT

## 2025-04-09 ENCOUNTER — CLINICAL SUPPORT (OUTPATIENT)
Dept: REHABILITATION | Facility: HOSPITAL | Age: 45
End: 2025-04-09
Payer: MEDICARE

## 2025-04-09 DIAGNOSIS — R26.89 BALANCE PROBLEM: Primary | ICD-10-CM

## 2025-04-09 DIAGNOSIS — R29.898 WEAKNESS OF BOTH LOWER EXTREMITIES: ICD-10-CM

## 2025-04-09 DIAGNOSIS — I96 TOE GANGRENE: ICD-10-CM

## 2025-04-09 PROCEDURE — 97112 NEUROMUSCULAR REEDUCATION: CPT | Mod: CQ

## 2025-04-09 PROCEDURE — 97530 THERAPEUTIC ACTIVITIES: CPT | Mod: CQ

## 2025-04-09 PROCEDURE — 97110 THERAPEUTIC EXERCISES: CPT | Mod: CQ

## 2025-04-09 NOTE — PROGRESS NOTES
"  Outpatient Rehab    Physical Therapy Visit    Patient Name: Merline Ibarra  MRN: [id number]  YOB: 1980  Encounter Date: 2025    Therapy Diagnosis:   No diagnosis found.    Physician: Spring Connell NP    Physician Orders: Eval and Treat  Medical Diagnosis: Toe gangrene  Amputation of one or more toes  Gait instability    Visit # / Visits Authorized:    Insurance Authorization Period: 3/10/2025 to 2025  Date of Evaluation:  3/10/2025   Plan of Care Certification:  3/10/2025 to 2025       PT/PTA:   PTA  Number of PTA visits since last PT visit:   Time In: 0850   Time Out: 0930  Total Time: 40   Total Billable Time:  40    FOTO:  Intake Score: 42 %  Survey Score 1:  %  Survey Score 2:  %         Subjective   Patient is without new complaints.  Patient arrives ambulation with RW..  Pain reported as 0/10.      Objective        No objective measures taken today.     Treatment:  Therapeutic Exercise  TE 1: nustep: 8 minutes, lvl 5  TE 2: hooklying isometric hip adduction with ball 20 x 3" (not completed)  TE 3: seated hip abduction, with theraband: 20, green band  TE 4: seated hamstring curls: 2 x 10, green band  TE 5: Seated marching 2 x 10 each with green band  TE 6: Clamshells 2 x 10 each green badn (not completed)  Balance/Neuromuscular Re-Education  NMR 1: Mini squats 2 x 10  NMR 2: brideg with ADD isometric 2 x 10 3" (not completed)  NMR 3: stationary marchin with focus on ankle DF during hip and knee flexion  NMR 4: LAQ 2 x 10 2# each  NMR 5: Seated Hamstring curls 2 x 10 , red tband  Therapeutic Activity  TA 1: Sit to stands using arms to assist to RW 2 x 5  TA 2: Standing march bilateral LE's 2 x 10 at WALKER  TA 3: Mini squats x 10 with patient standing at walker  TA 4: Standing hip abduction, 2 x 10  TA 5: Standing hip extension, 2 x 10    Time Entry(in minutes):  Neuromuscular Re-Education Time Entry: 12  Therapeutic Activity Time Entry: 12  Therapeutic Exercise " Time Entry: 16    Assessment & Plan   Assessment: Patient frequently reports feeling mod fatigue throughout PT treatment session, and requires occasional tactile and verbal cues for balance recovery during standing activities.  During sit to stand repetitions, patient appear to have LOB but once cued by LPTA no other LOB noted.       Patient will continue to benefit from skilled outpatient physical therapy to address the deficits listed in the problem list box on initial evaluation, provide pt/family education and to maximize pt's level of independence in the home and community environment.     Patient's spiritual, cultural, and educational needs considered and patient agreeable to plan of care and goals.           Plan: Continue with current treatment plan and progress as able/tolerated.    Goals:   Active       Long Term Goals        Patient will have increased B lower extremity hip muscle strength to greater than or equal to 4+/5.       Start:  03/10/25    Expected End:  04/18/25            Patient will have increased bilateral lower extremity ankle strength the greater than or equal to 3+/5 for improved gait mechanics.        Start:  03/10/25    Expected End:  04/18/25            Patient will independently ambulate with a straight cane for increased independence        Start:  03/10/25    Expected End:  04/18/25            Patient will have increased FOTO score to greater than or equal to 54 % indicating increased function.        Start:  03/10/25    Expected End:  04/18/25               Short Term Goals        Patient will independently complete home exercise program with correct form.         Start:  03/10/25    Expected End:  04/18/25            Patient will report decreased pain to less than or equal to 5/10 for improved quality of life.        Start:  03/10/25    Expected End:  04/18/25                Nicol Murphy, PTA

## 2025-04-15 ENCOUNTER — DOCUMENTATION ONLY (OUTPATIENT)
Dept: REHABILITATION | Facility: HOSPITAL | Age: 45
End: 2025-04-15
Payer: MEDICARE

## 2025-04-15 PROBLEM — R29.898 WEAKNESS OF BOTH LOWER EXTREMITIES: Status: RESOLVED | Noted: 2025-03-10 | Resolved: 2025-04-15

## 2025-04-15 PROBLEM — R26.89 BALANCE PROBLEM: Status: RESOLVED | Noted: 2025-03-10 | Resolved: 2025-04-15

## 2025-04-15 NOTE — PROGRESS NOTES
[facility]  Physical Therapy Discharge Note    Patient Name: Merline Ibarra  MRN: [id number]  YOB: 1980     Therapy Diagnosis:   No diagnosis found.     Physician: Spring Connell NP     Physician Orders: Eval and Treat  Medical Diagnosis: Toe gangrene  Amputation of one or more toes  Gait instability      Date of Last visit: 4/9/2025  Total Visits Received: 6    ASSESSMENT      Pt is discharging today due to complications with wound on foot. Wound has not fully closed, and MD has requested pt hold on PT until they can get full wound closure.     Discharge reason: Other: need to achieve wound closure of wound on foot    Discharge FOTO Score: NA    Goals: not met    PLAN   This patient is discharged from Physical Therapy    Justin Song, PT, DPT   4/15/2025

## 2025-06-23 ENCOUNTER — PATIENT OUTREACH (OUTPATIENT)
Facility: HOSPITAL | Age: 45
End: 2025-06-23
Payer: MEDICARE

## 2025-06-23 NOTE — PROGRESS NOTES
Population Health Chart Review & Patient Outreach Details  **2025 Chart review for [employer], pt seeing Dr. Pearl for DM management. Message sent to pt to see if he has an appointment set up, if not to remind him to follow up with Dr. Pearl     Further Action Needed If Patient Returns Outreach:        Health Maintenance Due   Topic Date Due    Hepatitis C Screening  Never done    Diabetes Urine Screening  Never done    Foot Exam  Never done    Diabetic Eye Exam  Never done    HIV Screening  Never done    TETANUS VACCINE  Never done    Sign Pain Contract  Never done    Complete Opioid Risk Tool  Never done    Pneumococcal Vaccines (Age 0-49) (1 of 2 - PCV) Never done    Low Dose Statin  Never done    Lipid Panel  2024    COVID-19 Vaccine (2024- season) Never done    Hemoglobin A1c  2025          Updates Requested / Reviewed:     [x]  Care Everywhere    [x]     []  External Sources ([facility], [facility], [facility], etc.)    [] [facility]   [] [facility]   [] Other:    []  Care Team Updated   []  Removed  or Duplicate Orders   []  Immunization Reconciliation Completed / Queried    [] [place]   [] [place]   [] [place]   [] [place]      Health Maintenance Topics Addressed and Outreach Outcomes / Actions Taken:             Breast Cancer Screening []  Mammogram Order Placed    []  Mammogram Screening Scheduled    []  External Records Requested & Care Team Updated if Applicable    []  External Records Uploaded & Care Team Updated if Applicable    []  Pt Declined Scheduling Mammogram    []  Pt Will Schedule with External Provider / Order Routed & Care Team Updated if Applicable              Cervical Cancer Screening []  Pap Smear Scheduled in Primary Care or OBGYN    []  External Records Requested & Care Team Updated if Applicable       []  External Records Uploaded, Care Team Updated, & History Updated if Applicable    []  Patient Declined Scheduling Pap Smear    []  Patient Will Schedule  with External Provider & Care Team Updated if Applicable                  Colorectal Cancer Screening []  Colonoscopy Case Request / Referral / Home Test Order Placed    []  External Records Requested & Care Team Updated if Applicable    []  External Records Uploaded, Care Team Updated, & History Updated if Applicable    []  Patient Declined Completing Colon Cancer Screening    []  Patient Will Schedule with External Provider & Care Team Updated if Applicable    []  Fit Kit Mailed (add the SmartPhrase under additional notes)    []  Reminded Patient to Complete Home Test                Diabetic Eye Exam []  Eye Exam Screening Order Placed    []  Eye Camera Scheduled or Optometry/Ophthalmology Referral Placed    []  External Records Requested & Care Team Updated if Applicable    []  External Records Uploaded, Care Team Updated, & History Updated if Applicable    []  Patient Declined Scheduling Eye Exam    []  Patient Will Schedule with External Provider & Care Team Updated if Applicable             Blood Pressure Control []  Primary Care Follow Up Visit Scheduled     []  Remote Blood Pressure Reading Captured    []  Patient Declined Remote Reading or Scheduling Appt - Escalated to PCP    []  Patient Will Call Back or Send Portal Message with Reading                 HbA1c & Other Labs []  Overdue Lab(s) Ordered    []  Overdue Lab(s) Scheduled    []  External Records Uploaded & Care Team Updated if Applicable    []  Primary Care Follow Up Visit Scheduled     []  Reminded Patient to Complete A1c Home Test    []  Patient Declined Scheduling Labs or Will Call Back to Schedule    [x]  Patient Will Schedule with External Provider / Order Routed, & Care Team Updated if Applicable           Primary Care Appointment []  Primary Care Appt Scheduled    []  Patient Declined Scheduling or Will Call Back to Schedule    []  Pt Established with External Provider, Updated Care Team, & Informed Pt to Notify Payor if Applicable            Medication Adherence /    Statin Use []  Primary Care Appointment Scheduled    []  Patient Reminded to  Prescription    []  Patient Declined, Provider Notified if Needed    []  Sent Provider Message to Review to Evaluate Pt for Statin, Add Exclusion Dx Codes, Document   Exclusion in Problem List, Change Statin Intensity Level to Moderate or High Intensity if Applicable                Osteoporosis Screening []  Dexa Order Placed    []  Dexa Appointment Scheduled    []  External Records Requested & Care Team Updated    []  External Records Uploaded, Care Team Updated, & History Updated if Applicable    []  Patient Declined Scheduling Dexa or Will Call Back to Schedule    []  Patient Will Schedule with External Provider / Order Routed & Care Team Updated if Applicable       Additional Notes: